# Patient Record
(demographics unavailable — no encounter records)

---

## 2024-10-21 NOTE — END OF VISIT
[Time Spent: ___ minutes] : I have spent [unfilled] minutes of time on the encounter which excludes teaching and separately reported services.
[Time Spent: ___ minutes] : I have spent [unfilled] minutes of time on the encounter which excludes teaching and separately reported services.
no

## 2024-10-23 NOTE — PHYSICAL EXAM
[No Acute Distress] : no acute distress [Normal Appearance] : normal appearance [No Neck Mass] : no neck mass [Normal Rate/Rhythm] : normal rate/rhythm [Normal S1, S2] : normal s1, s2 [No Murmurs] : no murmurs [No Resp Distress] : no resp distress [No Abnormalities] : no abnormalities [Benign] : benign [No Clubbing] : no clubbing [No Edema] : no edema [Normal Color/ Pigmentation] : normal color/ pigmentation [Oriented x3] : oriented x3 [Normal Affect] : normal affect [TextBox_11] : NC/AT [TextBox_68] : diffuse mid to end expiratory wheezes b/l

## 2024-10-23 NOTE — ASSESSMENT
[FreeTextEntry1] : Pathology (10/10/24): Final Diagnosis  1. Stomach; biopsy:  - Antral and oxyntic mucosa with mild reactive changes. No intestinal metaplasia or H. pylori (H T E stain) identified.  2. Left lung nodule; biopsy:  - Fragments of benign thyroid tissue and blood (see note).  - No lung parenchyma is identified. Note: Immunohistochemical stains for TTF1, PAX8, and thyroglobulin highlight the thyroid follicles. The current specimen may not be representative of the target lesion, re-biopsy is recommended if clinical concern persists.   CT Chest 8/23/24, ED visit 8/28/24, CT chest 8/28/24  PET/CT (9/2024): IMPRESSION: Abnormal skull-to-thigh FDG-PET/CT scan. 1. An intensely FDG avid irregular solid nodule in the paramediastinal left lung apex, compatible with lung malignancy. No hypermetabolic hilar or mediastinal nodes. 2. New superior endplate deformities in T12 and L2 with increased activity may reflect recent trauma; please correlate clinically. 3. No suspicious FDG avid malignant tissue in the remaining field of view.  COPD data: Inhaler: Symbicort 160-4.5, Incruse ellipta, albuterol Last exacerbation: 3/3/24  # of exacerbations since initiating Tezspire: 0 (previously 5) Bicarb 24 (3/24) in HIE  6MWT 4/12/2024: 252 meters, no desat on RA, Sierra 7  ABG 4/19/24: no evidence of hypercapnia  IgE 3/26/24: 411 (ON Prednisone 60mg)  PFTs      4/2024 FEV1/FVC  67% FEV1      1.37, 66% FVC       2.06, 78% TLC       3.26, 66% RV        1.21, 63% DLCO      10.06, 45% -4/2024: No bronchodilator response  A/P: 62 yo F h/o COPD and CHAPARRITA lung nodule returns to clinic.  I reviewed Ms. Pompey's case with radiology. The CHAPARRITA nodule is new compared to prior, persistent, and hypermetabolic on PET. With thyroid tissue in the biopsy, I suspect the current biopsy is not representative of the lesion. Options include repeat EUS, CT guided biopsy, or surgical biopsy. EBUS would need to traverse the esophagus to sample the lesion. I would favor repeat EUS before pursuing a surgical biopsy. Ms. RODRIGUEZ understands and agrees.   I spoke to Dr. Wilkerson, who will rearrange repeat EUS next week.  1. CHAPARRITA lung nodule: hypermetabolic 2. COPD: moderate per GOLD 3. Emphysema 4. h/o RA 5. History of eosinophilia  -continue LABA/ICS, Tezspire; PRN Albuterol -discussed with GI; repeat EUS; if again negative, I think she will need a surgical biopsy -check Nodify cdt/xl2 -follow-up with me or Dr. Nieves in 2 weeks

## 2024-10-23 NOTE — HISTORY OF PRESENT ILLNESS
[Former] : former [>= 20 pack years] : >= 20 pack years [Rheumatologic] : rheumatologic [TextBox_4] : I saw Ms. Monica Pompey in clinic today in follow-up for her CHAPARRITA lung nodule, tobacco use, and COPD; she is accompanied by her daughter, who supplements her history. In review, Ms. Pompey is a 60 yo F h/o tobacco use c/b COPD and emphysema, RA (on Etanercept), and anxiety Ms. Pompey was last seen by Dr. Nieves with plan for EUS by Dr. Wilkerson  61F (accomapnied by her daughter: She) former smokers (smoked about one pack a day for 27 years, quit 17 years ago) with COPD (atopic phenotype) and ICU admissions in the past (never intubated) referred to pulm to establish care. At initial visit was using symbicort and albuterol plus chest vest for airway clearance twice a day. Has a chronic cough produces clear sputum. Can walk maximum 1-2 blocks before she gets short of breath. No history of TB. Not UTD on flu vaccine or pnuemonia vaccine. On 60mg prednisone daily for many years, and bactrim qd. She is commonly exposed to people who smoke cigarettes which also triggers her COPD. Has RA. Follows with Dr. Dotty Avila. Currently on hydroxychloraquine 100-200mg once a day. Formally on etanercept.  Sent for CT chest given symptoms and high risk malignancy, found to have CHAPARRITA nodule concerning for malignancy vs inflammatory. PFTs obtained, demonstrated mod obstruction and restriction, mod reduced DLCO, no BDR. Decreased 6MWT to 252 meters without desaturation but verona score 7. No hypercapnia on ABG. Found to have elevated IgE (411) while ON prednisone 60mg. She was escalated to triple therapy (incruse/symbicort) + azithromycin and instructed to attempt very slow prednisone wean, however at last follow up was unable to tolerate weaning of prednisone, OK from 60mg -->50mg, but could not tolerate 40mg and went back on 50mg daily. She did not get repeat CT chest as instructed but had been adherent to azithromycin and bactrim. Plan was to obtain HRCT and start biologic w/ Tezpire. She has been difficult to contact and initiate therapy, received first dose teszpire during last visit and attempt to wean prednisone again after 3 months. Plan was to obtain CT chest as instructed previously and sleep study.  No sleep study done, but returns to follow up CT.  COPD: Inhaler: Symbicort 160-4.5, albuterol Last exacerbation: 3/3/24  # of exacerbations in last 12 months: 5 Bicarb 24 (3/24) in HIE  She had been doing incredibly well - prednisone was down to 20mg daily, she had no cough, breathing was great. She had a mechanical fall and was told by ED she had nondisplaced fracture of L2, seen by neurosurgery who recommended pain control, but her pharmacy does not have any available pain medication. With her fall and stress she noticed a slight worsening of her breathing/wheezing, took prednisone 40mg yesterday. Today she has not taken her inhaler or prednisone, she took an albuterol pump during visit with me.  In august 2024 We reviewed her CT findings with concerning nodule that will require EUS.  10/2024: Today, Ms. RODRIGUEZ feels anxious. She has not had a respiratory exacerbation since our last visit. She is tearful today in discussing the evaluation of her lung nodule. She is currently taking Prednisone 60 mg PO daily, which is treating her joint disease. She is tolerating the Symbicort and Tezepelumab well.  [YearQuit] : 2006

## 2024-10-23 NOTE — PROCEDURE
[FreeTextEntry1] : EcG 8/1/24 Nonspecific ST and T wave abnormality When compared with ECG of 12-AUG-2022 15:53, No significant change was found  CT Chest 8/23/24: When compared to examination dated 5/30/2024 there has been interval development of a part solid nodule measuring 29 x 28 mm in transverse by AP dimensions (9:67) within the apical posterior segment of the left upper lobe. The solid component has increased in size measuring 26 x 12 mm as above. Further imaging to include whole-body PET/CT is suggested to exclude developing adenocarcinoma. Substantial paraseptal and moderate centrilobular emphysema again noted. Multiple stable subcentimeter as well as solid and calcified micronodules as described above. *** CT Chest 5/30/24 (personally reviewed images, formal report pending) - Severe upper lobe paraseptal emphysema, some centrilobular emphysema, no evidence of bronchiectasis or ABPA, CHAPARRITA 8mm part solid nodule with cystic lucencies suspicious for malignancy *** PFT 4/12/24 FVC 2.06 (78%) --> 2.06 (76%) FEV1 1.40 (68%) --> 1.37 (66%) FEV1/FVC 68% TLC 3.25 (66%) DLCO 10.06 (45%)  Mixed moderate obstructive and restrictive pattern with moderately reduced DLCO  6MWT 252 m, SpO2 96%-->97%,  --> 120, /83 --> 136/84, Sierra SOB 7, fatigue 4 ABG 7.41/46/29 (actually VBG) *** Labs 3/26/24 IgE 411 (ELEV) ProBNP 167 CBC - test cancelled due to clotting

## 2024-10-23 NOTE — ASSESSMENT
[FreeTextEntry1] : Pathology (10/10/24): Final Diagnosis  1. Stomach; biopsy:  - Antral and oxyntic mucosa with mild reactive changes. No intestinal metaplasia or H. pylori (H T E stain) identified.  2. Left lung nodule; biopsy:  - Fragments of benign thyroid tissue and blood (see note).  - No lung parenchyma is identified. Note: Immunohistochemical stains for TTF1, PAX8, and thyroglobulin highlight the thyroid follicles. The current specimen may not be representative of the target lesion, re-biopsy is recommended if clinical concern persists.   CT Chest 8/23/24, ED visit 8/28/24, CT chest 8/28/24  PET/CT (9/2024): IMPRESSION: Abnormal skull-to-thigh FDG-PET/CT scan. 1. An intensely FDG avid irregular solid nodule in the paramediastinal left lung apex, compatible with lung malignancy. No hypermetabolic hilar or mediastinal nodes. 2. New superior endplate deformities in T12 and L2 with increased activity may reflect recent trauma; please correlate clinically. 3. No suspicious FDG avid malignant tissue in the remaining field of view.  COPD data: Inhaler: Symbicort 160-4.5, Incruse ellipta, albuterol Last exacerbation: 3/3/24  # of exacerbations since initiating Tezspire: 0 (previously 5) Bicarb 24 (3/24) in HIE  6MWT 4/12/2024: 252 meters, no desat on RA, Sierra 7  ABG 4/19/24: no evidence of hypercapnia  IgE 3/26/24: 411 (ON Prednisone 60mg)  PFTs      4/2024 FEV1/FVC  67% FEV1      1.37, 66% FVC       2.06, 78% TLC       3.26, 66% RV        1.21, 63% DLCO      10.06, 45% -4/2024: No bronchodilator response  A/P: 60 yo F h/o COPD and CHAPARRITA lung nodule returns to clinic.  I reviewed Ms. Pompey's case with radiology. The CHAPARRITA nodule is new compared to prior, persistent, and hypermetabolic on PET. With thyroid tissue in the biopsy, I suspect the current biopsy is not representative of the lesion. Options include repeat EUS, CT guided biopsy, or surgical biopsy. EBUS would need to traverse the esophagus to sample the lesion. I would favor repeat EUS before pursuing a surgical biopsy. Ms. RODRIGUEZ understands and agrees.   I spoke to Dr. Wilkerson, who will rearrange repeat EUS next week.  1. CHAPARRITA lung nodule: hypermetabolic 2. COPD: moderate per GOLD 3. Emphysema 4. h/o RA 5. History of eosinophilia  -continue LABA/ICS, Tezspire; PRN Albuterol -discussed with GI; repeat EUS; if again negative, I think she will need a surgical biopsy -check Nodify cdt/xl2 -follow-up with me or Dr. Nieves in 2 weeks

## 2024-10-23 NOTE — CONSULT LETTER
[Dear  ___] : Dear  [unfilled], [Consult Letter:] : I had the pleasure of evaluating your patient, [unfilled]. [Please see my note below.] : Please see my note below. [Consult Closing:] : Thank you very much for allowing me to participate in the care of this patient.  If you have any questions, please do not hesitate to contact me. [FreeTextEntry2] : Dr. Bernabe Chong

## 2024-10-23 NOTE — ADDENDUM
[FreeTextEntry1] : Addendum (Nnamdi; 10/23/24): I called Ms. Pompey's daughter about her Nodify testing.  The pre-test risk was increased using the cdt assay. There is a moderate level of autoantibodies.  Nodify cdt: post-test risk of malignancy: 78%  The result supports our plan for a surgery evaluation, if the repeat EUS is negative. She is scheduled for 10/24/24.

## 2024-10-23 NOTE — REVIEW OF SYSTEMS
[Fatigue] : fatigue [Cough] : cough [Chest Tightness] : chest tightness [Dyspnea] : dyspnea [Wheezing] : wheezing [SOB on Exertion] : sob on exertion [Immunocompromised] : immunocompromised [Arthralgias] : arthralgias [Chronic Pain] : chronic pain [Negative] : Psychiatric [TextBox_57] : chronic steroids

## 2024-11-06 NOTE — PHYSICAL EXAM
[No Acute Distress] : no acute distress [Normal Oropharynx] : normal oropharynx [Normal Appearance] : normal appearance [No Neck Mass] : no neck mass [Normal Rate/Rhythm] : normal rate/rhythm [Normal S1, S2] : normal s1, s2 [No Murmurs] : no murmurs [No Resp Distress] : no resp distress [No Abnormalities] : no abnormalities [Benign] : benign [No Clubbing] : no clubbing [No Edema] : no edema [Normal Color/ Pigmentation] : normal color/ pigmentation [Oriented x3] : oriented x3 [Normal Affect] : normal affect [TextBox_68] : diffuse mid to end expiratory wheezes b/l

## 2024-11-06 NOTE — HISTORY OF PRESENT ILLNESS
[Former] : former [>= 20 pack years] : >= 20 pack years [TextBox_4] : 61F (accomapnied by her daughter: She) former smokers (smoked about one pack a day for 27 years, quit 17 years ago) with COPD (atopic phenotype) and ICU admissions in the past (never intubated) referred to pulm to establish care. At initial visit was using symbicort and albuterol plus chest vest for airway clearance twice a day. Could only walk maximum 1-2 blocks before she gets short of breath and was chronically on 60mg prednisone daily for many years, and bactrim qd. She is commonly exposed to people who smoke cigarettes which also triggers her COPD. Has RA. Follows with Dr. Dotty Avila. Currently on hydroxychloraquine 100-200mg once a day.  PFTs obtained, demonstrated mod obstruction and restriction, mod reduced DLCO, no BDR. Decreased 6MWT to 252 meters without desaturation. No hypercapnia on ABG. Found to have elevated IgE (411) while ON prednisone 60mg. She was escalated to triple therapy (incruse/symbicort) + azithromycin and instructed to attempt very slow prednisone wean. but had difficulty and thus started on fasenra.  Also sent for CT chest given symptoms and high risk malignancy, found to have CHAPARRITA nodule now confirmed to be malignancy via EUS bx of apical CHAPARRITA nodule, favoring adeno. Returns for follow up.  COPD: Inhaler: Symbicort 160-4.5, albuterol Last exacerbation: 3/3/24  # of exacerbations since initiating Fasenra: 0 Bicarb 24 (3/24) in HIE  Overall significant improvement in her dyspnea and chronic bronchitis symptoms since starting Fasenra - she remains on prednisone because she stopped the wean when the focus was on possible lung ca. Admits she takes it sparingly because she knows it's dangerous to stop "suddenly", probably takes 40mg every 2-3 days to avoid sudden cessation. Using nebulizer sparingly, walking more, less SOB. No chest pain, no dizziness, Significant depression/anxiety about cancer diagnosis. [YearQuit] : 2006 [Rheumatologic] : rheumatologic

## 2024-11-06 NOTE — ASSESSMENT
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 Had significant improvement on Fasenra and triple therapy (Incruse, Breo), and azithromycin. Discussed further weaning of prednisone - 30mg x 1 week, 20mg x 1 week, 10mg x 2 weeks and will follow up in 1 month. Plan to repeat eos/IgE at next visit.  CHAPARRITA part solid from 5/2024 has grown in size with now 2cm lesion and increased solid component, paraesophageal. Now s/p EUS x 2 (first EUS nondiagnostic, pathology thyroid tissue), repeat EUS 10/24/24 with lung ca favoring adeno. Plan for brain MRI and bronchoscopy with EBUS to complete staging and differentiate between IIb and IIIa (radiation vs. radiation + chemo). PDL1 and targetable mutations sent.  EBUS confirmed for 11/11/24, referral to Dr. Parson and Dr. Wernicke sent. Brain MRI ordered. Follow up 1 month.

## 2024-11-06 NOTE — PROCEDURE
[FreeTextEntry1] : Pathology 10/24/24 Final Diagnosis LUNG, LEFT, UPPER LOBE, FNA  POSITIVE FOR MALIGNANT CELLS. Non- small cell carcinoma, favor adenocarcinoma - see Note. The cell block shows scant similar findings.  *** EcG 8/1/24 Nonspecific ST and T wave abnormality When compared with ECG of 12-AUG-2022 15:53, No significant change was found  CT Chest 8/23/24: When compared to examination dated 5/30/2024 there has been interval development of a part solid nodule measuring 29 x 28 mm in transverse by AP dimensions (9:67) within the apical posterior segment of the left upper lobe. The solid component has increased in size measuring 26 x 12 mm as above. Further imaging to include whole-body PET/CT is suggested to exclude developing adenocarcinoma. Substantial paraseptal and moderate centrilobular emphysema again noted. Multiple stable subcentimeter as well as solid and calcified micronodules as described above. *** CT Chest 5/30/24 (personally reviewed images, formal report pending) - Severe upper lobe paraseptal emphysema, some centrilobular emphysema, no evidence of bronchiectasis or ABPA, CHAPARRITA 8mm part solid nodule with cystic lucencies suspicious for malignancy *** PFT 4/12/24 FVC 2.06 (78%) --> 2.06 (76%) FEV1 1.40 (68%) --> 1.37 (66%) FEV1/FVC 68% TLC 3.25 (66%) DLCO 10.06 (45%)  Mixed moderate obstructive and restrictive pattern with moderately reduced DLCO  6MWT 252 m, SpO2 96%-->97%,  --> 120, /83 --> 136/84, Sierra SOB 7, fatigue 4 ABG 7.41/46/29 (actually VBG) *** Labs 3/26/24 IgE 411 (ELEV) ProBNP 167 CBC - test cancelled due to clotting

## 2024-11-13 NOTE — ASSESSMENT
[FreeTextEntry1] : 61F, former smoker, on steroids and immune modulators for RA, here for newly diagnosed lung adenocarcinoma.  #NSCLC - T4NxMx, at least Stage IIIA KRAS G12C,  Staging is incomplete at this point pending results of the EBUS and brain MR. Prior tumor board discussion made mention of concerns for mediastinal invasion. In any case, treatment would likely be combination chemo immunotherapy, however further discussion with her rheumatologist is necessary regarding her RA treatment as her steroid doses are incompatible with immunotherapy. Discussed with patient and family that we will need to reconvene once we have all the relevant test results to further tailor her treatment. - labs ordered: hepatitis panel, HIV, CBC, CMP (patient will have them drawn at [facility]) - plan for telehealth follow-up Monday afternoon

## 2024-11-13 NOTE — END OF VISIT
[] : Fellow [FreeTextEntry3] : Seen with , oncology fellow.  62 yo F with smoking history, who quit 17 yrs ago here for initial evaluation of NSCLC, stage IIIA at least. At this time we need to obtain the following info: final staging - pending EBUS results (done on 11/11) and MRIB (scheduled for Monday 11/18) I worry because she is on high dose prednisone for several years for her RA. This is not compatible with IO adminsitration. NGS testing reveals a KRAS G12C mutation and PDL1 40-50%. Will see her in follow up on Monday via TH after her MRIB is done to finalize treatment plan. [Time Spent: ___ minutes] : I have spent [unfilled] minutes of time on the encounter which excludes teaching and separately reported services.

## 2024-11-13 NOTE — HISTORY OF PRESENT ILLNESS
[Disease: _____________________] : Disease: [unfilled] [T: ___] : T[unfilled] [N: ___] : N[unfilled] [M: ___] : M[unfilled] [AJCC Stage: ____] : AJCC Stage: [unfilled] [de-identified] : 61F former smoker (27py, quit ~17y ago) referred by Dr. Nieves after surveillance CT scans showing interval growth of CHAPARRITA lesion with biopsy showing lung adenocarcinoma, s/p EBUS 24 with results pending and MRI scheduled for 24, here to establish care.  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 Nieves for her COPD/atopic asthma. Given her smoking history, she also has been under surveillance for a lung nodule that has increased in size.    CT Chest 24 Left upper lobe 0.8 cm subsolid nodule contiguous with and adjacent to an emphysematous cystic air space medially, which demonstrates irregular nodular wall thickening as described above. The exact etiology is unclear, but one differential diagnostic consideration is for primary lung neoplasm. Recommend correlation with PET/CT. No evidence of pulmonary fibrosis as clinically questioned.  CT Chest 24 When compared to examination dated 2024 there has been interval development of a part solid nodule measuring 29 x 28 mm in transverse by AP dimensions (9:67) within the apical posterior segment of the left upper lobe. The solid component has increased in size measuring 26 x 12 mm as above. Further imaging to include whole-body PET/CT is suggested to exclude developing adenocarcinoma. Substantial paraseptal and moderate centrilobular emphysema again noted. Multiple stable subcentimeter as well as solid and calcified micronodules as described above.  PET-CT 24 Abnormal skull-to-thigh FDG-PET/CT scan. 1. An intensely FDG avid irregular solid nodule in the paramediastinal left lung apex, compatible with lung malignancy. No hypermetabolic hilar or mediastinal nodes. 2. New superior endplate deformities in T12 and L2 with increased activity may reflect recent trauma; please correlate clinically. 3. No suspicious FDG avid malignant tissue in the remaining field of view.  Lung, CHAPARRITA FNA Pathology 10/24/24 POSITIVE FOR MALIGNANT CELLS. Non- small cell carcinoma, favor adenocarcinoma - see Note. The cell block shows scant similar findings.  EBUS 24 Results pending  MRI Brain 24  [de-identified] : Adenocarcinoma - PDL1 40-50%, KRAS G12C [de-identified] : Pulm:  [de-identified] : She is visibly and vocally upset due to prior interactions with some of the staff. Denies experiencing any symptoms relating to her lung cancer compared to "everything else that she has going on." No noted shortness of breath. Has a chronic cough that predates detected lesion.

## 2024-11-13 NOTE — REASON FOR VISIT
[Initial Consultation] : an initial consultation [Family Member] : family member [FreeTextEntry2] : NSCLC - adenocarcinoma

## 2024-11-13 NOTE — PHYSICAL EXAM
[Restricted in physically strenuous activity but ambulatory and able to carry out work of a light or sedentary nature] : Status 1- Restricted in physically strenuous activity but ambulatory and able to carry out work of a light or sedentary nature, e.g., light house work, office work [Normal] : grossly intact [de-identified] : tachycardic [de-identified] : currently upset given the circumstances

## 2024-11-13 NOTE — PHYSICAL EXAM
[Restricted in physically strenuous activity but ambulatory and able to carry out work of a light or sedentary nature] : Status 1- Restricted in physically strenuous activity but ambulatory and able to carry out work of a light or sedentary nature, e.g., light house work, office work [Normal] : grossly intact [de-identified] : tachycardic [de-identified] : currently upset given the circumstances

## 2024-11-13 NOTE — REVIEW OF SYSTEMS
[Cough] : cough [Joint Pain] : joint pain [Anxiety] : anxiety [Depression] : depression [Negative] : Heme/Lymph

## 2024-11-13 NOTE — PHYSICAL EXAM
[Restricted in physically strenuous activity but ambulatory and able to carry out work of a light or sedentary nature] : Status 1- Restricted in physically strenuous activity but ambulatory and able to carry out work of a light or sedentary nature, e.g., light house work, office work [Normal] : grossly intact [de-identified] : tachycardic [de-identified] : currently upset given the circumstances

## 2024-11-18 NOTE — HISTORY OF PRESENT ILLNESS
[FreeTextEntry1] : Ms. 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 Shepard. Staging is incomplete EBUS results [id number]/18/24      MRI result pending 11/18/24  Heme Onc: Dr. Shepard:  treatment would likely be combination chemo immunotherapy, however further discussion with her rheumatologist is necessary regarding her RA treatment as her steroid doses are incompatible with immunotherapy Smoking Hx Prior RT: Pacemaker G; P: Menopause  11/26/24 NEW:  _______________________________________________________________________________________ CT Chest 5/30/24 Left upper lobe 0.8 cm subsolid nodule contiguous with and adjacent to an emphysematous cystic air space medially, which demonstrates irregular nodular wall thickening as described above. The exact etiology is unclear, but one differential diagnostic consideration is for primary lung neoplasm. Recommend correlation with PET/CT. No evidence of pulmonary fibrosis as clinically questioned.  CT Chest 8/23/24 When compared to examination dated 5/30/2024 there has been interval development of a part solid nodule measuring 29 x 28 mm in transverse by AP dimensions (9:67) within the apical posterior segment of the left upper lobe. The solid component has increased in size measuring 26 x 12 mm as above. Further imaging to include whole-body PET/CT is suggested to exclude developing adenocarcinoma. Substantial paraseptal and moderate centrilobular emphysema again noted. Multiple stable subcentimeter as well as solid and calcified micronodules as described above.  PET-CT 9/25/24 Abnormal skull-to-thigh FDG-PET/CT scan. 1. An intensely FDG avid irregular solid nodule in the paramediastinal left lung apex, compatible with lung malignancy. No hypermetabolic hilar or mediastinal nodes. 2. New superior endplate deformities in T12 and L2 with increased activity may reflect recent trauma; please correlate clinically. 3. No suspicious FDG avid malignant tissue in the remaining field of view.  Lung, CHAPARRITA FNA Pathology 10/24/24 POSITIVE FOR MALIGNANT CELLS. Non- small cell carcinoma, favor adenocarcinoma - see Note. The cell block shows scant similar findings.  EBUS 11/11/24 Results pending  MRI Brain 11/18/24.

## 2024-11-19 NOTE — REASON FOR VISIT
[Initial Consultation] : an initial consultation [Home] : at home, [unfilled] , at the time of the visit. [Medical Office: ([facility])___] : at the medical office located in  [Family Member] : family member [Follow-Up Visit] : a follow-up [FreeTextEntry2] : NSCLC - adenocarcinoma

## 2024-11-19 NOTE — ASSESSMENT
[FreeTextEntry1] : 62 yo F with smoking history, who quit 17 yrs ago here for follow up of KRAS G12C, PDL1 40-50%, NSCLC, stage IIIA.  NSCLC - T4N0M0 - Stage IIIA I independently reviewed her MRI brain which to me appears to be negative, though final read is still pending. I recommend she be evaluated by CT surgery for possibly surgically candidacy.  If she is a surgical candidate she would be a candidate for neoadjuvant chemoimmunotherapy per Checkmate 77T, keynote 671 or AEGEAN. I confirmed with her today that she has been off all steroids for the past 3 months. I also discussed her case with Dr. Seymour, her rheumatologist, Dr. Seymour stated that she is cleared for immunotherapy from her standpoint and that she does not require steroids.  She is only on Plaquenil for her rheumatoid arthritis. We will bring her in next week for chemo education if she is determined to be a surgical candidate. Blood work to be obtained next week: CBC, CMP, TSH, hepatitis panel, HIV All questions answered.

## 2024-11-19 NOTE — HISTORY OF PRESENT ILLNESS
[Disease: _____________________] : Disease: [unfilled] [T: ___] : T[unfilled] [N: ___] : N[unfilled] [M: ___] : M[unfilled] [AJCC Stage: ____] : AJCC Stage: [unfilled] [de-identified] : 61F former smoker (27py, quit ~17y ago) referred by Dr. Nieves after surveillance CT scans showing interval growth of CHAPARRITA lesion with biopsy showing lung adenocarcinoma, s/p EBUS 24 with results pending and MRI scheduled for 24, here to establish care.  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 Nieves for her COPD/atopic asthma. Given her smoking history, she also has been under surveillance for a lung nodule that has increased in size.    CT Chest 24 Left upper lobe 0.8 cm subsolid nodule contiguous with and adjacent to an emphysematous cystic air space medially, which demonstrates irregular nodular wall thickening as described above. The exact etiology is unclear, but one differential diagnostic consideration is for primary lung neoplasm. Recommend correlation with PET/CT. No evidence of pulmonary fibrosis as clinically questioned.  CT Chest 24 When compared to examination dated 2024 there has been interval development of a part solid nodule measuring 29 x 28 mm in transverse by AP dimensions (9:67) within the apical posterior segment of the left upper lobe. The solid component has increased in size measuring 26 x 12 mm as above. Further imaging to include whole-body PET/CT is suggested to exclude developing adenocarcinoma. Substantial paraseptal and moderate centrilobular emphysema again noted. Multiple stable subcentimeter as well as solid and calcified micronodules as described above.  PET-CT 24 Abnormal skull-to-thigh FDG-PET/CT scan. 1. An intensely FDG avid irregular solid nodule in the paramediastinal left lung apex, compatible with lung malignancy. No hypermetabolic hilar or mediastinal nodes. 2. New superior endplate deformities in T12 and L2 with increased activity may reflect recent trauma; please correlate clinically. 3. No suspicious FDG avid malignant tissue in the remaining field of view.  Lung, CHAPARRITA FNA Pathology 10/24/24 POSITIVE FOR MALIGNANT CELLS. Non- small cell carcinoma, favor adenocarcinoma - see Note. The cell block shows scant similar findings.  EBUS 24 ALL ln NEGATIVE  MRI Brain 24 - READ PENDING [de-identified] : Adenocarcinoma - PDL1 40-50%, KRAS G12C [de-identified] : Pulm:  [de-identified] : She is visibly and vocally upset due to prior interactions with some of the staff. Denies experiencing any symptoms relating to her lung cancer compared to "everything else that she has going on." No noted shortness of breath. Has a chronic cough that predates detected lesion.

## 2024-11-19 NOTE — REASON FOR VISIT
[Initial Consultation] : an initial consultation [Home] : at home, [unfilled] , at the time of the visit. [Medical Office: ([facility])___] : at the medical office located in  [Family Member] : family member [Follow-Up Visit] : a follow-up [FreeTextEntry2] : NSCLC - adenocarcinoma

## 2024-11-26 NOTE — PHYSICAL EXAM
[Restricted in physically strenuous activity but ambulatory and able to carry out work of a light or sedentary nature] : Status 1- Restricted in physically strenuous activity but ambulatory and able to carry out work of a light or sedentary nature, e.g., light house work, office work [General Appearance - Alert] : alert [General Appearance - In No Acute Distress] : in no acute distress [Outer Ear] : the ears and nose were normal in appearance [Oropharynx] : the oropharynx was normal [Neck Appearance] : the appearance of the neck was normal [Neck Cervical Mass (___cm)] : no neck mass was observed [Jugular Venous Distention Increased] : there was no jugular-venous distention [Thyroid Diffuse Enlargement] : the thyroid was not enlarged [Thyroid Nodule] : there were no palpable thyroid nodules [Auscultation Breath Sounds / Voice Sounds] : lungs were clear to auscultation bilaterally [Heart Sounds Gallop] : no gallops [Murmurs] : no murmurs [Heart Sounds Pericardial Friction Rub] : no pericardial rub [Tachycardic ___] : the heart rate was tachycardic at [unfilled] bpm [Regular] : the rhythm was regular [Bowel Sounds] : normal bowel sounds [Abdomen Soft] : soft [Abdomen Tenderness] : non-tender [Abdomen Mass (___ Cm)] : no abdominal mass palpated [Cervical Lymph Nodes Enlarged Posterior Bilaterally] : posterior cervical [Cervical Lymph Nodes Enlarged Anterior Bilaterally] : anterior cervical [Supraclavicular Lymph Nodes Enlarged Bilaterally] : supraclavicular [Axillary Lymph Nodes Enlarged Bilaterally] : axillary [Femoral Lymph Nodes Enlarged Bilaterally] : femoral [Inguinal Lymph Nodes Enlarged Bilaterally] : inguinal [FreeTextEntry1] : walk with a rolling walker [Skin Color & Pigmentation] : normal skin color and pigmentation [Skin Turgor] : normal skin turgor [] : no rash [Deep Tendon Reflexes (DTR)] : deep tendon reflexes were 2+ and symmetric [Sensation] : the sensory exam was normal to light touch and pinprick [No Focal Deficits] : no focal deficits [Oriented To Time, Place, And Person] : oriented to person, place, and time [Impaired Insight] : insight and judgment were intact [Affect] : the affect was normal

## 2024-11-26 NOTE — HISTORY OF PRESENT ILLNESS
[FreeTextEntry1] : Ms. 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 Parson. EBUS 3 LN negative for malignant cells 11/11/24. She is seeing Dr. Garland with CT surgery to determine if she is eligible for surgical resection on 11/26/24.   Heme Onc: Dr. Shepard:  treatment would likely be combination chemo immunotherapy, however further discussion with her rheumatologist is necessary regarding her RA treatment as her steroid doses are incompatible with immunotherapy Smoking Hx: former smoker, quit 17 years ago Prior RT: Pacemaker G; P: Menopause  11/26/24 NEW:  _______________________________________________________________________________________ CT Chest 5/30/24 Left upper lobe 0.8 cm subsolid nodule contiguous with and adjacent to an emphysematous cystic air space medially, which demonstrates irregular nodular wall thickening as described above. The exact etiology is unclear, but one differential diagnostic consideration is for primary lung neoplasm. Recommend correlation with PET/CT. No evidence of pulmonary fibrosis as clinically questioned.  CT Chest 8/23/24 When compared to examination dated 5/30/2024 there has been interval development of a part solid nodule measuring 29 x 28 mm in transverse by AP dimensions (9:67) within the apical posterior segment of the left upper lobe. The solid component has increased in size measuring 26 x 12 mm as above. Further imaging to include whole-body PET/CT is suggested to exclude developing adenocarcinoma. Substantial paraseptal and moderate centrilobular emphysema again noted. Multiple stable subcentimeter as well as solid and calcified micronodules as described above.  PET-CT 9/25/24 Abnormal skull-to-thigh FDG-PET/CT scan. 1. An intensely FDG avid irregular solid nodule in the paramediastinal left lung apex, compatible with lung malignancy. No hypermetabolic hilar or mediastinal nodes. 2. New superior endplate deformities in T12 and L2 with increased activity may reflect recent trauma; please correlate clinically. 3. No suspicious FDG avid malignant tissue in the remaining field of view.  Lung, CHAPARRITA FNA Pathology 10/24/24 POSITIVE FOR MALIGNANT CELLS. Non- small cell carcinoma, favor adenocarcinoma - see Note. The cell block shows scant similar findings.  EBUS 11/11/24 3 LN negative for malignant cells  MRI Brain 11/18/24 -No evidence of intracranial metastasis.

## 2024-11-26 NOTE — ASSESSMENT
[FreeTextEntry1] : 60 yo F with smoking history, who quit 17 yrs ago here for follow up of KRAS G12C, PDL1 40-50%, NSCLC, stage IIIA.  NSCLC - T4N0M0 - Stage IIIA --we discussed during the visit today strategies depending on stage. TB discussion was indicative of T4 disease, needing SKYLA vs ChemoRT. We discussed the option of carboplatin/pemetrexed/nivolumab as SKYLA per Checkmate 77T vs cisplatin/pemetrexed with RT per PROCLAIM trial which showed non-inferiority to cisplatin/etoposide with RT. She understands however that cisplatin might not be an option depending on her labs today and hearing test. --we will defer her hearing test for now, as she will see  today who will determine surgical candidacy All questions answered. RTC in 3 wks, to be changed depending on evaluation by CTSx.

## 2024-11-26 NOTE — REASON FOR VISIT
[Follow-Up Visit] : a follow-up [Initial Consultation] : an initial consultation [Home] : at home, [unfilled] , at the time of the visit. [Medical Office: ([facility])___] : at the medical office located in  [Family Member] : family member [FreeTextEntry2] : NSCLC - adenocarcinoma

## 2024-11-26 NOTE — REASON FOR VISIT
[Follow-Up Visit] : a follow-up [Initial Consultation] : an initial consultation [Home] : at home, [unfilled] , at the time of the visit. [Medical Office: ([facility])___] : at the medical office located in  [Family Member] : family member [FreeTextEntry2] : NSCLC - adenocarcinoma

## 2024-11-26 NOTE — HISTORY OF PRESENT ILLNESS
[Disease: _____________________] : Disease: [unfilled] [T: ___] : T[unfilled] [N: ___] : N[unfilled] [M: ___] : M[unfilled] [AJCC Stage: ____] : AJCC Stage: [unfilled] [de-identified] : 61F former smoker (27py, quit ~17y ago) referred by Dr. Nieves after surveillance CT scans showing interval growth of CHAPARRITA lesion with biopsy showing lung adenocarcinoma, s/p EBUS 24 with results pending and MRI scheduled for 24, here for follow up.  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 Nieves for her COPD/atopic asthma. Given her smoking history, she also has been under surveillance for a lung nodule that has increased in size.    CT Chest 24 Left upper lobe 0.8 cm subsolid nodule contiguous with and adjacent to an emphysematous cystic air space medially, which demonstrates irregular nodular wall thickening as described above. The exact etiology is unclear, but one differential diagnostic consideration is for primary lung neoplasm. Recommend correlation with PET/CT. No evidence of pulmonary fibrosis as clinically questioned.  CT Chest 24 When compared to examination dated 2024 there has been interval development of a part solid nodule measuring 29 x 28 mm in transverse by AP dimensions (9:67) within the apical posterior segment of the left upper lobe. The solid component has increased in size measuring 26 x 12 mm as above. Further imaging to include whole-body PET/CT is suggested to exclude developing adenocarcinoma. Substantial paraseptal and moderate centrilobular emphysema again noted. Multiple stable subcentimeter as well as solid and calcified micronodules as described above.  PET-CT 24 Abnormal skull-to-thigh FDG-PET/CT scan. 1. An intensely FDG avid irregular solid nodule in the paramediastinal left lung apex, compatible with lung malignancy. No hypermetabolic hilar or mediastinal nodes. 2. New superior endplate deformities in T12 and L2 with increased activity may reflect recent trauma; please correlate clinically. 3. No suspicious FDG avid malignant tissue in the remaining field of view.  Lung, CHAPARRITA FNA Pathology 10/24/24 POSITIVE FOR MALIGNANT CELLS. Non- small cell carcinoma, favor adenocarcinoma - see Note. The cell block shows scant similar findings.  EBUS 24 ALL ln NEGATIVE  MRI Brain 24 - No evidence of intracranial metastasis. [de-identified] : Adenocarcinoma - PDL1 40-50%, KRAS G12C [de-identified] : Pulm:  CTSx:  [de-identified] : No complaints today.

## 2024-11-26 NOTE — DATA REVIEWED
[FreeTextEntry1] : PFT on 04/08/2024 FEV1 = 1.40, 88% of predicted value, FVC = 2.06, 78% of predicted value, PEF = 3.29, 59% of predicted value and DLCO = 10.06, 45% of predicted value.

## 2024-11-26 NOTE — HISTORY OF PRESENT ILLNESS
[FreeTextEntry1] : Patient is a 62 yo female, former smoker (1ppd x 27 yrs, quit 15rs ago), with a PMHx of COPD, Rheumatoid arthritis, T2DM, HLD, hypothyroidism, glaucoma and cataracts. She is referred by DR. JAJA BOLTON for surgical discussion of recent biopsy proven lung adenocarcinoma.   She was found to have a left upper lobe subsolid nodule in May 2024. Follow up CT chest in Aug showed the developing adjacent part solid nodule measuring 29x28 mm in transverse by AP dimensions.  She underwent EGD EUS biopsy with Dr. Wilkerson on 10/24/2024 and was found to have non-small cell carcinoma, favor adenocarcinoma.  Her case was discussed at  on 10/31/2024.  There was possible mediastinal invasion.  She was referred to see radiation (Dr. Wernicke) and med oncology (Dr. Parson). Plan was to complete staging with brain mri and ebus. PDL1 and targetable mutations sent.  She had her EBUS biopsy on 11/11/2024 with Dr. Salinas and Brain MRI on 11/18/2024, presenting today for surgical consultation. Patient reports in her usual health status without unintentional weight loss, headache, anorexia, fatigue, chest pain, dyspnea, or hemoptysis. Stopped prednisone recently.   Work up is as follows:  MRI Brain 11/18/24 -No evidence of intracranial metastasis.  EBUS 11/11/24: LYMPH NODE,4R, EBUS-GUIDED FNA: NEGATIVE FOR MALIGNANT CELLS LYMPH NODE,11L, EBUS-GUIDED FNA: NEGATIVE FOR MALIGNANT CELLS LYMPH NODE,7, EBUS-GUIDED FNA: NEGATIVE FOR MALIGNANT CELLS  Lung, CHAPARRITA FNA Pathology 10/24/24 POSITIVE FOR MALIGNANT CELLS. Non- small cell carcinoma, favor adenocarcinoma - see Note. The cell block shows scant similar findings.  PET-CT 9/25/24 Abnormal skull-to-thigh FDG-PET/CT scan. 1. An intensely FDG avid irregular solid nodule in the paramediastinal left lung apex, compatible with lung malignancy. No hypermetabolic hilar or mediastinal nodes. 2. New superior endplate deformities in T12 and L2 with increased activity may reflect recent trauma; please correlate clinically. 3. No suspicious FDG avid malignant tissue in the remaining field of view.  CT Chest 8/23/24 When compared to examination dated 5/30/2024 there has been interval development of a part solid nodule measuring 29 x 28 mm in transverse by AP dimensions (9:67) within the apical posterior segment of the left upper lobe. The solid component has increased in size measuring 26 x 12 mm as above. Further imaging to include whole-body PET/CT is suggested to exclude developing adenocarcinoma. Substantial paraseptal and moderate centrilobular emphysema again noted. Multiple stable subcentimeter as well as solid and calcified micronodules as described above.  CT Chest 5/30/24 Left upper lobe 0.8 cm subsolid nodule contiguous with and adjacent to an emphysematous cystic air space medially, which demonstrates irregular nodular wall thickening as described above. The exact etiology is unclear, but one differential diagnostic consideration is for primary lung neoplasm. Recommend correlation with PET/CT. No evidence of pulmonary fibrosis as clinically questioned.

## 2024-11-26 NOTE — ASSESSMENT
[FreeTextEntry1] : 62 yo F with smoking history, who quit 17 yrs ago here for follow up of KRAS G12C, PDL1 40-50%, NSCLC, stage IIIA.  NSCLC - T4N0M0 - Stage IIIA --we discussed during the visit today strategies depending on stage. TB discussion was indicative of T4 disease, needing SKYLA vs ChemoRT. We discussed the option of carboplatin/pemetrexed/nivolumab as SKYLA per Checkmate 77T vs cisplatin/pemetrexed with RT per PROCLAIM trial which showed non-inferiority to cisplatin/etoposide with RT. She understands however that cisplatin might not be an option depending on her labs today and hearing test. --we will defer her hearing test for now, as she will see  today who will determine surgical candidacy All questions answered. RTC in 3 wks, to be changed depending on evaluation by CTSx.

## 2024-11-26 NOTE — ASSESSMENT
[FreeTextEntry1] : Patient is a 60 yo female, former smoker (1ppd x 27 yrs, quit 15rs ago), with a PMHx of COPD, Rheumatoid arthritis, T2DM, HLD, hypothyroidism, glaucoma and cataracts. She is referred by DR. JAJA BOLTON for surgical discussion of recent biopsy proven lung adenocarcinoma.   She was found to have a left upper lobe subsolid nodule in May 2024. Follow up CT chest in Aug showed the developing adjacent part solid nodule measuring 29x28 mm in transverse by AP dimensions.  She underwent EGD EUS biopsy with Dr. Wilkerson on 10/24/2024 and was found to have non-small cell carcinoma, favor adenocarcinoma.  Her case was discussed at  on 10/31/2024.  There was possible mediastinal invasion.  She was referred to see radiation (Dr. Wernicke) and med oncology (Dr. Parson). She had her EBUS biopsy on 11/11/2024 with Dr. Salinas and Brain MRI on 11/18/2024, presenting today for surgical consultation. Patient reports in her usual health status without unintentional weight loss, headache, anorexia, fatigue, chest pain, dyspnea, or hemoptysis. Stopped prednisone recently.    Patient feels better since starting medications to treat her COPD. Patient's chest radiology reviewed with daughter including images and shows 29 mm CHAPARRITA nodule that is FDG avid and consistent with biopsy proven carcinoma. The patient would like it out. There is a question of mediastinal invasion which would make it T4. Given her desire for removal, we will plan for an operative exploration to see if there is invasion. If there is no invasion, we will plan for a CHAPARRITA wedge for resection. The risks and benefits of the Left Robotic Assisted/VATS CHAPARRITA wedge resection, possible lobectomy, possible thoracotomy procedure were discussed at length including but not limited to risks of infection, bleeding, need for thoracotomy, arrhythmias, thromboembolic complications, myocardial infarction, need for repetitive procedures, recurrent laryngeal nerve injury leading to hoarseness, as well as risks of anesthesia. Specific risks discussed included risk of recurrence, prolonged air leak, need for chest tube drainage and postoperative oxygen, need for adjuvant therapy, and the need for surveillance.  She wishes to proceed. We will need repeat PFTs and a cardiology clearance prior to the procedure.  Plan: - Left Robotic Assisted/VATS CHAPARRITA wedge resection, possible lobectomy, possible thoracotomy procedure - PFTs - Cardiology clearance  I, Dr. Lyndsey Garland MD, personally performed the evaluation and management (E/M) services for this new patient.  That E/M includes conducting the clinically appropriate initial history &/or exam, assessing all conditions, and establishing the plan of care.  I have also independently reviewed the medical records and imaging at the time of this office visit, and discussed the above mentioned images with interpretations with the patient. Today, my DVAIDA Fidelia Madden, was here to observe &/or participate in the visit & follow plan of care established by me.

## 2024-11-26 NOTE — HISTORY OF PRESENT ILLNESS
[FreeTextEntry1] : Patient is a 60 yo female, former smoker (1ppd x 27 yrs, quit 15rs ago), with a PMHx of COPD, Rheumatoid arthritis, T2DM, HLD, hypothyroidism, glaucoma and cataracts. She is referred by DR. JAJA BOLTON for surgical discussion of recent biopsy proven lung adenocarcinoma.   She was found to have a left upper lobe subsolid nodule in May 2024. Follow up CT chest in Aug showed the developing adjacent part solid nodule measuring 29x28 mm in transverse by AP dimensions.  She underwent EGD EUS biopsy with Dr. Wilkerson on 10/24/2024 and was found to have non-small cell carcinoma, favor adenocarcinoma.  Her case was discussed at  on 10/31/2024.  There was possible mediastinal invasion.  She was referred to see radiation (Dr. Wernicke) and med oncology (Dr. Parson). Plan was to complete staging with brain mri and ebus. PDL1 and targetable mutations sent.  She had her EBUS biopsy on 11/11/2024 with Dr. Salinas and Brain MRI on 11/18/2024, presenting today for surgical consultation. Patient reports in her usual health status without unintentional weight loss, headache, anorexia, fatigue, chest pain, dyspnea, or hemoptysis. Stopped prednisone recently.   Work up is as follows:  MRI Brain 11/18/24 -No evidence of intracranial metastasis.  EBUS 11/11/24: LYMPH NODE,4R, EBUS-GUIDED FNA: NEGATIVE FOR MALIGNANT CELLS LYMPH NODE,11L, EBUS-GUIDED FNA: NEGATIVE FOR MALIGNANT CELLS LYMPH NODE,7, EBUS-GUIDED FNA: NEGATIVE FOR MALIGNANT CELLS  Lung, CHAPARRITA FNA Pathology 10/24/24 POSITIVE FOR MALIGNANT CELLS. Non- small cell carcinoma, favor adenocarcinoma - see Note. The cell block shows scant similar findings.  PET-CT 9/25/24 Abnormal skull-to-thigh FDG-PET/CT scan. 1. An intensely FDG avid irregular solid nodule in the paramediastinal left lung apex, compatible with lung malignancy. No hypermetabolic hilar or mediastinal nodes. 2. New superior endplate deformities in T12 and L2 with increased activity may reflect recent trauma; please correlate clinically. 3. No suspicious FDG avid malignant tissue in the remaining field of view.  CT Chest 8/23/24 When compared to examination dated 5/30/2024 there has been interval development of a part solid nodule measuring 29 x 28 mm in transverse by AP dimensions (9:67) within the apical posterior segment of the left upper lobe. The solid component has increased in size measuring 26 x 12 mm as above. Further imaging to include whole-body PET/CT is suggested to exclude developing adenocarcinoma. Substantial paraseptal and moderate centrilobular emphysema again noted. Multiple stable subcentimeter as well as solid and calcified micronodules as described above.  CT Chest 5/30/24 Left upper lobe 0.8 cm subsolid nodule contiguous with and adjacent to an emphysematous cystic air space medially, which demonstrates irregular nodular wall thickening as described above. The exact etiology is unclear, but one differential diagnostic consideration is for primary lung neoplasm. Recommend correlation with PET/CT. No evidence of pulmonary fibrosis as clinically questioned.

## 2024-11-26 NOTE — REVIEW OF SYSTEMS
[Heart Rate Is Fast] : fast heart rate [Chest Pain] : no chest pain [Palpitations] : no palpitations [Leg Claudication] : intermittent leg claudication [Lower Ext Edema] : lower extremity edema [Wheezing] : no wheezing [Cough] : cough [SOB on Exertion] : shortness of breath during exertion [Arthralgias] : arthralgias [Negative] : Psychiatric

## 2024-11-26 NOTE — ASSESSMENT
[FreeTextEntry1] : Patient is a 60 yo female, former smoker (1ppd x 27 yrs, quit 15rs ago), with a PMHx of COPD, Rheumatoid arthritis, T2DM, HLD, hypothyroidism, glaucoma and cataracts. She is referred by DR. JAJA BOLTON for surgical discussion of recent biopsy proven lung adenocarcinoma.   She was found to have a left upper lobe subsolid nodule in May 2024. Follow up CT chest in Aug showed the developing adjacent part solid nodule measuring 29x28 mm in transverse by AP dimensions.  She underwent EGD EUS biopsy with Dr. Wilkerson on 10/24/2024 and was found to have non-small cell carcinoma, favor adenocarcinoma.  Her case was discussed at  on 10/31/2024.  There was possible mediastinal invasion.  She was referred to see radiation (Dr. Wernicke) and med oncology (Dr. Parson). She had her EBUS biopsy on 11/11/2024 with Dr. Salinas and Brain MRI on 11/18/2024, presenting today for surgical consultation. Patient reports in her usual health status without unintentional weight loss, headache, anorexia, fatigue, chest pain, dyspnea, or hemoptysis. Stopped prednisone recently.    Patient feels better since starting medications to treat her COPD. Patient's chest radiology reviewed with daughter including images and shows 29 mm CHAPARRITA nodule that is FDG avid and consistent with biopsy proven carcinoma. The patient would like it out. There is a question of mediastinal invasion which would make it T4. Given her desire for removal, we will plan for an operative exploration to see if there is invasion. If there is no invasion, we will plan for a CHAPARRITA wedge for resection. The risks and benefits of the Left Robotic Assisted/VATS CHAPARRITA wedge resection, possible lobectomy, possible thoracotomy procedure were discussed at length including but not limited to risks of infection, bleeding, need for thoracotomy, arrhythmias, thromboembolic complications, myocardial infarction, need for repetitive procedures, recurrent laryngeal nerve injury leading to hoarseness, as well as risks of anesthesia. Specific risks discussed included risk of recurrence, prolonged air leak, need for chest tube drainage and postoperative oxygen, need for adjuvant therapy, and the need for surveillance.  She wishes to proceed. We will need repeat PFTs and a cardiology clearance prior to the procedure.  Plan: - Left Robotic Assisted/VATS CHAPARRITA wedge resection, possible lobectomy, possible thoracotomy procedure - PFTs - Cardiology clearance  I, Dr. Lyndsey Garland MD, personally performed the evaluation and management (E/M) services for this new patient.  That E/M includes conducting the clinically appropriate initial history &/or exam, assessing all conditions, and establishing the plan of care.  I have also independently reviewed the medical records and imaging at the time of this office visit, and discussed the above mentioned images with interpretations with the patient. Today, my DAVIDA Fidelia Madden, was here to observe &/or participate in the visit & follow plan of care established by me.

## 2024-11-26 NOTE — HISTORY OF PRESENT ILLNESS
[Disease: _____________________] : Disease: [unfilled] [T: ___] : T[unfilled] [N: ___] : N[unfilled] [M: ___] : M[unfilled] [AJCC Stage: ____] : AJCC Stage: [unfilled] [de-identified] : 61F former smoker (27py, quit ~17y ago) referred by Dr. Nieves after surveillance CT scans showing interval growth of CHAPARRITA lesion with biopsy showing lung adenocarcinoma, s/p EBUS 24 with results pending and MRI scheduled for 24, here for follow up.  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 Nieves for her COPD/atopic asthma. Given her smoking history, she also has been under surveillance for a lung nodule that has increased in size.    CT Chest 24 Left upper lobe 0.8 cm subsolid nodule contiguous with and adjacent to an emphysematous cystic air space medially, which demonstrates irregular nodular wall thickening as described above. The exact etiology is unclear, but one differential diagnostic consideration is for primary lung neoplasm. Recommend correlation with PET/CT. No evidence of pulmonary fibrosis as clinically questioned.  CT Chest 24 When compared to examination dated 2024 there has been interval development of a part solid nodule measuring 29 x 28 mm in transverse by AP dimensions (9:67) within the apical posterior segment of the left upper lobe. The solid component has increased in size measuring 26 x 12 mm as above. Further imaging to include whole-body PET/CT is suggested to exclude developing adenocarcinoma. Substantial paraseptal and moderate centrilobular emphysema again noted. Multiple stable subcentimeter as well as solid and calcified micronodules as described above.  PET-CT 24 Abnormal skull-to-thigh FDG-PET/CT scan. 1. An intensely FDG avid irregular solid nodule in the paramediastinal left lung apex, compatible with lung malignancy. No hypermetabolic hilar or mediastinal nodes. 2. New superior endplate deformities in T12 and L2 with increased activity may reflect recent trauma; please correlate clinically. 3. No suspicious FDG avid malignant tissue in the remaining field of view.  Lung, CHAPARRITA FNA Pathology 10/24/24 POSITIVE FOR MALIGNANT CELLS. Non- small cell carcinoma, favor adenocarcinoma - see Note. The cell block shows scant similar findings.  EBUS 24 ALL ln NEGATIVE  MRI Brain 24 - No evidence of intracranial metastasis. [de-identified] : Adenocarcinoma - PDL1 40-50%, KRAS G12C [de-identified] : Pulm:  CTSx:  [de-identified] : No complaints today.

## 2024-11-29 NOTE — BEGINNING OF VISIT
[0] : 2) Feeling down, depressed, or hopeless: Not at all (0) [PHQ-2 Negative] : PHQ-2 Negative [Pain Scale: ___] : On a scale of 1-10, today the patient's pain is a(n) [unfilled]. [Former] : Former [20 or more] : 20 or more [> 15 Years] : > 15 Years [Date Discussed (MM/DD/YY): ___] : Discussed: [unfilled] [Patient/Caregiver not ready to engage] : Patient/Caregiver not ready to engage [Abdominal Pain] : no abdominal pain [Vomiting] : no vomiting [Constipation] : no constipation

## 2024-12-05 NOTE — PHYSICAL EXAM
[No Acute Distress] : no acute distress [Normal Conjunctiva] : normal conjunctiva [Normal Venous Pressure] : normal venous pressure [Normal] : normal S1, S2, no murmur, no rub, no gallop [Soft] : abdomen soft [Non Tender] : non-tender [No Masses/organomegaly] : no masses/organomegaly [No Edema] : no edema [Normal Radial B/L] : normal radial B/L [de-identified] : thyroid mildly enlarged diffusely [de-identified] : poor air movement [de-identified] : distended  distended

## 2024-12-05 NOTE — REASON FOR VISIT
[FreeTextEntry1] : New patient here for pre-operative evaluation prior to planned thoracic surgery with Dr. Garland, scheduled for December 10. Longstanding history of cigarette smoking, which she discontinued 15 years ago, leading to severe COPD. Found to have lung nodule on screening and subsequent work-up identified adenocarcinoma. Now scheduled for VATS and possible lobectomy. No prior cardiac history or recent cardiovascular testing. She has HTN and DM, attributed to longstanding steroid therapy for rheumatoid arthritis. No family hx of premature CHD; no hx of elevated cholesterol. Able to walk 4-6 blocks, Limited by exertional dyspnea, which she reported as improved with recent therapies. No chest pain, palpitations, lightheadedness. Does not engage in any more physically challenging activities. Chronic mild resting tachycardia (lifelong).  Had  surgery about 15 years ago with general anesthesia; no complications.

## 2024-12-18 NOTE — HISTORY OF PRESENT ILLNESS
[Former] : former [>= 20 pack years] : >= 20 pack years [Rheumatologic] : rheumatologic [TextBox_4] : 61F (accomapnied by her daughter: She) former smokers (smoked about one pack a day for 27 years, quit 17 years ago) with COPD (atopic phenotype) and ICU admissions in the past (never intubated) referred to pulm to establish care. At initial visit was using symbicort and albuterol plus chest vest for airway clearance twice a day. Could only walk maximum 1-2 blocks before she gets short of breath and was chronically on 60mg prednisone daily for many years, and bactrim qd. She is commonly exposed to people who smoke cigarettes which also triggers her COPD. Has RA. Follows with Dr. Dotty Avila. Currently on hydroxychloraquine 100-200mg once a day.  PFTs obtained, demonstrated mod obstruction and restriction, mod reduced DLCO, no BDR. Decreased 6MWT to 252 meters without desaturation. No hypercapnia on ABG. Found to have elevated IgE (411) while ON prednisone 60mg. She was escalated to triple therapy (incruse/symbicort) + azithromycin and instructed to attempt very slow prednisone wean. but had difficulty and thus started on fasenra.  Also sent for CT chest given symptoms and high risk malignancy, found to have CHAPARRITA nodule now confirmed to be adenoca via EUS bx of apical CHAPARRITA nodule, now s/p CHAPARRITA wedge resection, path with some suggestion of mediastinal invasion, T3N0.  COPD: Inhaler: Symbicort 160-4.5, albuterol Last exacerbation: 3/3/24  # of exacerbations since initiating Fasenra: 0 Bicarb 24 (3/24) in [facility]   12-18-24 status post left VATS, robotic assisted, left upper lobe wedge resection, MLND    Surgery Date: 12/10/24  Postop hospitalization complicated with persist air leak. Patient was discharged home on 12/13/2024 with a Pneumostat. Had severe pain w/ chest tube over the last 2 days, yesterday into today took at least 9 pills of extra strength tylenol, concerning for accidental tylenol overdose. Pain now improved with removal of tube, although still present. Anxious due to not sleeping for 2 days because of pain. Mild cough that is productive since surgery, but no wheezing.  Surgical pathology: - pT3N0, invasive solid adenocarcinoma. G3, poorly differentiated. - Size of invasive component 1.2cm. Visceral pleura invasion present. Direct invasion of adjacent structures present. Involved Adjacent Structures: Mediastinal region of parietal pleura. Spread Through Air Spaces (REGLA): Not identified - All margins negative - PDL1 and NGS pending. [YearQuit] : 2006

## 2024-12-18 NOTE — HISTORY OF PRESENT ILLNESS
[Former] : former [>= 20 pack years] : >= 20 pack years [Rheumatologic] : rheumatologic [TextBox_4] : 61F (accomapnied by her daughter: She) former smokers (smoked about one pack a day for 27 years, quit 17 years ago) with COPD (atopic phenotype) and ICU admissions in the past (never intubated) referred to pulm to establish care. At initial visit was using symbicort and albuterol plus chest vest for airway clearance twice a day. Could only walk maximum 1-2 blocks before she gets short of breath and was chronically on 60mg prednisone daily for many years, and bactrim qd. She is commonly exposed to people who smoke cigarettes which also triggers her COPD. Has RA. Follows with Dr. Dotty Avila. Currently on hydroxychloraquine 100-200mg once a day.  PFTs obtained, demonstrated mod obstruction and restriction, mod reduced DLCO, no BDR. Decreased 6MWT to 252 meters without desaturation. No hypercapnia on ABG. Found to have elevated IgE (411) while ON prednisone 60mg. She was escalated to triple therapy (incruse/symbicort) + azithromycin and instructed to attempt very slow prednisone wean. but had difficulty and thus started on fasenra.  Also sent for CT chest given symptoms and high risk malignancy, found to have CHAPARRITA nodule now confirmed to be adenoca via EUS bx of apical CHAPARRITA nodule, now s/p CHAPARRITA wedge resection, path with some suggestion of mediastinal invasion, T3N0.  COPD: Inhaler: Symbicort 160-4.5, albuterol Last exacerbation: 3/3/24  # of exacerbations since initiating Fasenra: 0 Bicarb 24 (3/24) in [facility]   12-18-24 status post left VATS, robotic assisted, left upper lobe wedge resection, MLND    Surgery Date: 12/10/24  Postop hospitalization complicated with persist air leak. Patient was discharged home on 12/13/2024 with a Pneumostat. Had severe pain w/ chest tube over the last 2 days, yesterday into today took at least 9 pills of extra strength tylenol, concerning for accidental tylenol overdose. Pain now improved with removal of tube, although still present. Anxious due to not sleeping for 2 days because of pain. Mild cough that is productive since surgery, but no wheezing.  Surgical pathology: - pT3N0, invasive solid adenocarcinoma. G3, poorly differentiated. - Size of invasive component 1.2cm. Visceral pleura invasion present. Direct invasion of adjacent structures present. Involved Adjacent Structures: Mediastinal region of parietal pleura. Spread Through Air Spaces (REGLA): Not identified - All margins negative - PDL1 and NGS pending. [YearQuit] : 2006

## 2024-12-18 NOTE — CONSULT LETTER
[Dear  ___] : Dear  [unfilled], [Consult Letter:] : I had the pleasure of evaluating your patient, [unfilled]. [Please see my note below.] : Please see my note below. [Consult Closing:] : Thank you very much for allowing me to participate in the care of this patient.  If you have any questions, please do not hesitate to contact me. [FreeTextEntry2] : Dr. Bernabe Chogn

## 2024-12-18 NOTE — ASSESSMENT
[FreeTextEntry1] : 61F (accomapnied by her daughter: She) former smokers (smoked about one pack a day for 27 years, quit 17 years ago) with COPD and ICU admissions in the past (never intubated) here today to follow up eosinophilic COPD and lung nodule found to be adenoca now s/p CHAPARRITA wedge resection  Data review:  Pathology 12/2025  COPD data: Inhaler: Symbicort 160-4.5, Incruse ellipta, albuterol Last exacerbation: 3/3/24  # of exacerbations since initiating Fasenra: 0 (previously 5) Bicarb 24 (3/24) in [facility] PFTs 4/12/2024: mod obstruction/restriction and mod decrease DLCO, no BD response  6MWT 4/12/2024: 252 meters, no desat on RA, [place] 7  ABG 4/19/24: no evidence of hypercapnia  IgE 3/26/24: 411 (ON Prednisone 60mg)  #COPD #Emphysema #chronic bronchitis #hypereosinophilia #Lung adenoca  Previously group E COPD. Had significant improvement on Fasenra and triple therapy (Incruse, Breo), and azithromycin. Has been off steroids for months without any recurrent exacerbation, excellent response to biologic. Now recovering from CHAPARRITA wedge resection and with severe pain at prior chest tube site, accidental ingestion of tylenol (9g? at least?).   Will send to ED for evaluation of LFTs, may need admission to monitor for tylenol toxicity. If stable and can be discharged home, will f/u with Dr. Parson tomorrow to discuss chemotherapy for T3N0 adeno.   No changes made to her COPD regimen, will have her repeat 6MWT in 1 month to evaluate recovery post op. Notified ER, Dr. Parson, and Dr. Garland re: tylenol ingestion and sending pt to ER.

## 2024-12-19 NOTE — ASSESSMENT
[FreeTextEntry1] : 60 yo F with smoking history, who quit 17 yrs ago here for follow up of KRAS G12C, PDL1 40-50%, NSCLC, stage IIB, s/p wedge plus MLND on 12/10/2024 with . Poorly differentiated, margins negative, direct invasion of mediastinum and visceral pleural invasion.  NSCLC - T3N0M0 - Stage IIB All lymph node samples during resection were negative for carcinoma, however there was direct invasion of mediastinum and pleura, for this reason patient up stage II T3 N0.  Per NCCN guidelines this requires adjuvant systemic therapy.  No role of radiation therapy, given margins are negative. --We will present her case at multidisciplinary thoracic tumor board as well --We discussed standard adjuvant systemic therapy for her histology, the preferred is cisplatin/pemetrexed x 4 cycles.  We discussed, risks, benefits, schedule, side effect profile.  Risks include but are not limited to MARIANELA, kidney injury, hearing loss, neuropathy, cytopenias, sepsis and death.  Prior to starting therapy her hearing test is needed. Educational materials provided, informed consent signed. --The patient has been taking Tylenol, 9 g 3 days ago in a 24-hour..  CMP done yesterday in the ER was normal.  CMP repeated today. --ordered hearing test --ordered CMP -- Patient was advised to take folic acid 1 mg daily, B12 1000 mcg daily, B12 1000 mcg intramuscular injection every 9 weeks --Dexamethasone 4 mg twice daily day before, day off chemotherapy, and on day 2, 3, 4 after chemotherapy to minimize skin reactions and refractory nausea --Zofran as needed for nausea --Olanzapine 5 mg at bedtime to be taken on day 2, 3, 4 after chemotherapy --Plan to schedule her for B12 injection on 1/7, with first chemotherapy on 1/14, provided her hearing test is within normal range

## 2024-12-19 NOTE — REASON FOR VISIT
[Follow-Up Visit] : a follow-up [Family Member] : family member [FreeTextEntry2] : NSCLC - adenocarcinoma

## 2024-12-19 NOTE — REASON FOR VISIT
[Spouse] : spouse [Family Member] : family member [de-identified] : left VATS, robotic assisted, left upper lobe wedge resection, MLND [de-identified] : 12/10/24 [de-identified] : 1 [de-identified] :   Postop hospitalization complicated with persist air leak.  Patient was discharged home on 12/13/2024 with a Pneumostat.   Surgical pathology:  - pT3N0, invasive solid adenocarcinoma. G3, poorly differentiated.   - Size of invasive component 1.2cm.  Visceral pleura invasion present. Direct invasion of adjacent structures present. Involved Adjacent Structures:  Mediastinal region of parietal pleura.  Spread Through Air Spaces (REGLA):    Not identified - All margins negative  - PDL1 and NGS pending.    Patient presents today with a CXR for the 1st postoperative visit. Patient reports 8-10/10 pain to the left chest wall, worse at the chest tube site.  She takes oxycodone 5mg Q6hrs, alternates with acetaminophen. She also has a productive cough, with clear phlegm.  Denies fever or chills.

## 2024-12-19 NOTE — REASON FOR VISIT
[Spouse] : spouse [Family Member] : family member [de-identified] : left VATS, robotic assisted, left upper lobe wedge resection, MLND [de-identified] : 12/10/24 [de-identified] : 1 [de-identified] :   Postop hospitalization complicated with persist air leak.  Patient was discharged home on 12/13/2024 with a Pneumostat.   Surgical pathology:  - pT3N0, invasive solid adenocarcinoma. G3, poorly differentiated.   - Size of invasive component 1.2cm.  Visceral pleura invasion present. Direct invasion of adjacent structures present. Involved Adjacent Structures:  Mediastinal region of parietal pleura.  Spread Through Air Spaces (REGLA):    Not identified - All margins negative  - PDL1 and NGS pending.    Patient presents today with a CXR for the 1st postoperative visit. Patient reports 8-10/10 pain to the left chest wall, worse at the chest tube site.  She takes oxycodone 5mg Q6hrs, alternates with acetaminophen. She also has a productive cough, with clear phlegm.  Denies fever or chills.

## 2024-12-19 NOTE — ASSESSMENT
[FreeTextEntry1] : Patient is a 62 yo female, former smoker (1ppd x 27 yrs, quit 15rs ago) and Rastafarian with a PMHx of COPD, RA, T2DM, HLD, hypothyroidism, glaucoma and cataracts who was referred by Dr. Yvette Nieves to Dr. Garland for biopsy prove lung adenocarcinoma.   She was found to have a CHAPARRITA subsolid nodule in May 2024 and had biopsy on October 10/24 showing non-small cell carcinoma, favoring adenocarcinoma.   On 12/10/2024, she underwent L VATS, RA CHAPARRITA wedge resection with MLND, and was discharged home with a pneumostat.  Pt presents today with family for the 1st postop visit.   Surgical pathology was reviewed with the patient and her family: Stage IIB, T3N0 invasive solid adenocarcinoma, all margins negative. Patient should continue treatment with oncologist Dr. Parson.   Her incisional pain is not well controlled with oxycodone 5mg [id number]. Will Rx hydromorphone 2mg PRN for severe pain. Educated the patient that both oxycodone and hydromorphone can cause sedation or altered mental status, should avoid taking together.    We removed the pneumostat smoothly in the office after confirming no air leak.  CXR after the chest tube removal showed no significant pneumothorax.  Patient should return to office in two weeks for stability and suture removal.   Plan: - Rx hydromorphone - RTO in 2 weeks - Follow up oncology  I, Dr. Lyndsey Garland MD, personally performed the evaluation and management (E/M) services for this established patient who presents today with (a) new problem(s)/exacerbation of (an) existing condition(s). That E/M includes conducting the clinically appropriate interval history &/or exam, assessing all new/exacerbated conditions, and establishing a new plan of care. I have also independently reviewed the medical records and imaging at the time of this office visit, and discussed the above mentioned images with interpretations with the patient. Today, my DAVIDA Fidelia Madden, was here to observe &/or participate in the visit & follow plan of care established by me.

## 2024-12-19 NOTE — DISCUSSION/SUMMARY
[Poor Control] : has poor pain control [No Sign of Infection] : is showing no signs of infection [Remove Sutures/Staples] : remove sutures/staples [de-identified] : remove chest tube

## 2024-12-19 NOTE — REASON FOR VISIT
[Spouse] : spouse [Family Member] : family member [de-identified] : left VATS, robotic assisted, left upper lobe wedge resection, MLND [de-identified] : 12/10/24 [de-identified] : 1 [de-identified] :   Postop hospitalization complicated with persist air leak.  Patient was discharged home on 12/13/2024 with a Pneumostat.   Surgical pathology:  - pT3N0, invasive solid adenocarcinoma. G3, poorly differentiated.   - Size of invasive component 1.2cm.  Visceral pleura invasion present. Direct invasion of adjacent structures present. Involved Adjacent Structures:  Mediastinal region of parietal pleura.  Spread Through Air Spaces (REGLA):    Not identified - All margins negative  - PDL1 and NGS pending.    Patient presents today with a CXR for the 1st postoperative visit. Patient reports 8-10/10 pain to the left chest wall, worse at the chest tube site.  She takes oxycodone 5mg Q6hrs, alternates with acetaminophen. She also has a productive cough, with clear phlegm.  Denies fever or chills.

## 2024-12-19 NOTE — ASSESSMENT
[FreeTextEntry1] : Patient is a 60 yo female, former smoker (1ppd x 27 yrs, quit 15rs ago) and Rastafari with a PMHx of COPD, RA, T2DM, HLD, hypothyroidism, glaucoma and cataracts who was referred by Dr. Yvette Nieves to Dr. Garland for biopsy prove lung adenocarcinoma.   She was found to have a CHAPARRITA subsolid nodule in May 2024 and had biopsy on October 10/24 showing non-small cell carcinoma, favoring adenocarcinoma.   On 12/10/2024, she underwent L VATS, RA CHAPARRITA wedge resection with MLND, and was discharged home with a pneumostat.  Pt presents today with family for the 1st postop visit.   Surgical pathology was reviewed with the patient and her family: Stage IIB, T3N0 invasive solid adenocarcinoma, all margins negative. Patient should continue treatment with oncologist Dr. Parson.   Her incisional pain is not well controlled with oxycodone 5mg [id number]. Will Rx hydromorphone 2mg PRN for severe pain. Educated the patient that both oxycodone and hydromorphone can cause sedation or altered mental status, should avoid taking together.    We removed the pneumostat smoothly in the office after confirming no air leak.  CXR after the chest tube removal showed no significant pneumothorax.  Patient should return to office in two weeks for stability and suture removal.   Plan: - Rx hydromorphone - RTO in 2 weeks - Follow up oncology  I, Dr. Lyndsey Garland MD, personally performed the evaluation and management (E/M) services for this established patient who presents today with (a) new problem(s)/exacerbation of (an) existing condition(s). That E/M includes conducting the clinically appropriate interval history &/or exam, assessing all new/exacerbated conditions, and establishing a new plan of care. I have also independently reviewed the medical records and imaging at the time of this office visit, and discussed the above mentioned images with interpretations with the patient. Today, my DAVIDA Fidelia Madden, was here to observe &/or participate in the visit & follow plan of care established by me.

## 2024-12-19 NOTE — ASSESSMENT
[FreeTextEntry1] : Patient is a 62 yo female, former smoker (1ppd x 27 yrs, quit 15rs ago) and [employer] with a PMHx of COPD, RA, T2DM, HLD, hypothyroidism, glaucoma and cataracts who was referred by Dr. Yvette Nieves to Dr. Garland for biopsy prove lung adenocarcinoma.   She was found to have a CHAPARRITA subsolid nodule in May 2024 and had biopsy on October 10/24 showing non-small cell carcinoma, favoring adenocarcinoma.   On 12/10/2024, she underwent L VATS, RA CHAPARRITA wedge resection with MLND, and was discharged home with a pneumostat.  Pt presents today with family for the 1st postop visit.   Surgical pathology was reviewed with the patient and her family: Stage IIB, T3N0 invasive solid adenocarcinoma, all margins negative. Patient should continue treatment with oncologist Dr. Parson.   Her incisional pain is not well controlled with oxycodone 5mg H5dniye. Will Rx hydromorphone 2mg PRN for severe pain. Educated the patient that both oxycodone and hydromorphone can cause sedation or altered mental status, should avoid taking together.    We removed the pneumostat smoothly in the office after confirming no air leak.  CXR after the chest tube removal showed no significant pneumothorax.  Patient should return to office in two weeks for stability and suture removal.   Plan: - Rx hydromorphone - RTO in 2 weeks - Follow up oncology  I, Dr. Lyndsey Garland MD, personally performed the evaluation and management (E/M) services for this established patient who presents today with (a) new problem(s)/exacerbation of (an) existing condition(s). That E/M includes conducting the clinically appropriate interval history &/or exam, assessing all new/exacerbated conditions, and establishing a new plan of care. I have also independently reviewed the medical records and imaging at the time of this office visit, and discussed the above mentioned images with interpretations with the patient. Today, my DAVIDA Fidelia Madden, was here to observe &/or participate in the visit & follow plan of care established by me.

## 2024-12-19 NOTE — DISCUSSION/SUMMARY
[Poor Control] : has poor pain control [No Sign of Infection] : is showing no signs of infection [Remove Sutures/Staples] : remove sutures/staples [de-identified] : remove chest tube

## 2024-12-19 NOTE — COUNSELING
[No Heavy Lifting] : no heavy lifting (>15-20 lb. for 1 month or 25 lb. for 3 months from date of surgery) [S/S of infection] : signs and symptoms of infection (and to whom it should be reported) [Medication/Vitamin/Herb/Food Interaction] : medication/vitamin/herb/food interaction

## 2024-12-19 NOTE — HISTORY OF PRESENT ILLNESS
[Disease: _____________________] : Disease: [unfilled] [T: ___] : T[unfilled] [N: ___] : N[unfilled] [M: ___] : M[unfilled] [AJCC Stage: ____] : AJCC Stage: [unfilled] [de-identified] : 61F former smoker (27py, quit ~17y ago) referred by Dr. Nieves after surveillance CT scans showing interval growth of CHAPARRITA lesion with biopsy showing lung adenocarcinoma, s/p EBUS 24 with results pending and MRI scheduled for 24, here for follow up.  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 Nieves for her COPD/atopic asthma. Given her smoking history, she also has been under surveillance for a lung nodule that has increased in size.    CT Chest 24 Left upper lobe 0.8 cm subsolid nodule contiguous with and adjacent to an emphysematous cystic air space medially, which demonstrates irregular nodular wall thickening as described above. The exact etiology is unclear, but one differential diagnostic consideration is for primary lung neoplasm. Recommend correlation with PET/CT. No evidence of pulmonary fibrosis as clinically questioned.  CT Chest 24 When compared to examination dated 2024 there has been interval development of a part solid nodule measuring 29 x 28 mm in transverse by AP dimensions (9:67) within the apical posterior segment of the left upper lobe. The solid component has increased in size measuring 26 x 12 mm as above. Further imaging to include whole-body PET/CT is suggested to exclude developing adenocarcinoma. Substantial paraseptal and moderate centrilobular emphysema again noted. Multiple stable subcentimeter as well as solid and calcified micronodules as described above.  PET-CT 24 Abnormal skull-to-thigh FDG-PET/CT scan. 1. An intensely FDG avid irregular solid nodule in the paramediastinal left lung apex, compatible with lung malignancy. No hypermetabolic hilar or mediastinal nodes. 2. New superior endplate deformities in T12 and L2 with increased activity may reflect recent trauma; please correlate clinically. 3. No suspicious FDG avid malignant tissue in the remaining field of view.  Lung, CHAPARRITA FNA Pathology 10/24/24 POSITIVE FOR MALIGNANT CELLS. Non- small cell carcinoma, favor adenocarcinoma - see Note. The cell block shows scant similar findings.  EBUS 24 ALL ln NEGATIVE  MRI Brain 24 - No evidence of intracranial metastasis.  12/10/2024: Pathology: Final Diagnosis  1.  Lung, left upper lobe with mediastinal pleura, resection: -   Poorly differentiated adenocarcinoma, solid (90%) and acinar (10%) patterns -   1.2 cm greatest dimension -   Resection edge negative for adenocarcinoma -   Fibrous adhesions with infiltrating adenocarcinoma present, see note -   Negative for lymphovascular invasion -   Negative for REGLA -   Emphysematous changes of surrounding lung -   See synoptic report below  Note: There is extensive fibrosis and inflammation surrounding the tumor. IHC workup reveals the malignant cells are positive for TTF-1.  They are negative for Napsin and p40.  Per Dr. MAURA Garland, the tumor was adhered to the mediastinal pleura.  Fragments of fibrous adhesions containing infiltrating adenocarcinoma are present in the specimen container (grossly described as free-floating tissue).  Because adenocarcinoma is present in the adhesions, this lesion will be staged as invading the mediastinal region of the parietal  pleura (pT3).  This case is discussed with Dr. Garland on 2024.  A portion the specimen will be sent for PD-L1 IHC and molecular testing.  2.  Lung, left upper lobe, additional margin excision: -   Negative for carcinoma -   Emphysematous change and subpleural bleb  3.  Lymph node, left level 9, excision: -   1 lymph node negative for metastatic carcinoma, 0/1  4.  Lymph node, level 7, excision: -   1 lymph node negative for metastatic carcinoma, 0/1  5.  Lymph node, left level 10, excision: -   3 lymph nodes negative for metastatic carcinoma, 0/3  6.  Lymph node, left level 5, excision: -   Multiple fragments of lymph node(s) negative for metastatic carcinoma  Verified by: Rickey Cordero M.D. (Electronic Signature) Reported on: 24 16:08 [facility], [facility], [address] [place] [address] Phone: [phone]   Fax: [phone] _________________________________________________________________  Comment mol 1A   Synoptic Summary 1: Lung - Resection Specimen Procedure:  Wedge resection Specimen Laterality:   Left Tumor Tumor Focality:   Single focus Tumor Site:  Upper lobe of lung Tumor Size Total Tumor Size:   1.2 Centimeters (cm) Size of Invasive Component:   1.2 Centimeters (cm) Histologic Type:   Invasive solid adenocarcinoma Histologic Patterns Present:   Acinar - 10%;  Solid - 90% Histologic Grade:   G3, poorly differentiated Spread Through Air Spaces (REGLA):    Not identified Visceral Pleura Invasion:   Present Direct Invasion of Adjacent Structures:    Present Involved Adjacent Structures:   Mediastinal region of parietal pleura Treatment Effect:   No known presurgical therapy Lymphovascular Invasion:   Not identified Margins Margin Status for Invasive Carcinoma:    All margins negative for invasive carcinoma Closest Margin(s) to Invasive Carcinoma:    Parenchymal Distance from Invasive Carcinoma to Closest Margin:    Greater than 0.8 cm Margin Status for Non-Invasive Tumor:    Not applicable Regional Lymph Nodes Lymph Node(s) from Prior Procedures:    No known prior lymph node sampling performed Regional Lymph Node Status:   All regional lymph nodes negative for tumor Number of Lymph Nodes Examined:   At least 6 Amira Site(s) Examined:   7: Subcarinal;  5: Subaortic / aortopulmonary (AP) / AP window;  9L: Pulmonary ligament; 10L: Hilar Pathologic Stage Classification (pTNM, AJCC 8th Edition) pT Category:   pT3 pN Category:   pN0 Best Tumor Blocks for Future Studies Tumor Block(s):   1A  [de-identified] : Adenocarcinoma - PDL1 40-50%, KRAS G12C [de-identified] : Pulm:  CTSx:  [de-identified] : No complaints today. Since last visit she underwent a wedge resection and mediastinal lymph node dissection with Dr. Garland on 12/10. Final pathology resulted as T3 lesion, not due to size, size was only 1.2 cm, but it was invading the mediastinum, therefore final stage was T3N0M0.

## 2024-12-19 NOTE — DISCUSSION/SUMMARY
[Poor Control] : has poor pain control [No Sign of Infection] : is showing no signs of infection [Remove Sutures/Staples] : remove sutures/staples [de-identified] : remove chest tube

## 2024-12-19 NOTE — PHYSICAL EXAM
[Bibasilar Rales/Crackles] : bibasilar rales [Tachycardic ___] : the heart rate was tachycardic at [unfilled] bpm [Regular] : the rhythm was regular [Clean] : clean [Dry] : dry [Healing Well] : healing well

## 2025-01-02 NOTE — REASON FOR VISIT
[de-identified] :  left VATS, robotic assisted, left upper lobe wedge resection, MLND  [de-identified] : 12/10/2024 [de-identified] : 3 [de-identified] :   Postop hospitalization complicated with persist air leak. Patient was discharged home on 12/13/2024 with a Pneumostat.     Surgical pathology: Stage IIB, pT3N0, invasive solid adenocarcinoma.    Her Pneumostat was removed in office on 12/18/2024. She c/o moderate to severe incisional pain during her visit on 12/18/2024, a course of hydromorphone was prescribed. She was also evaluated in ED on 12/18 for potential acetaminophen overdose (took 9gm on 12/17/2024). LFT in ED was normal.     Patient presents today with a CXR for the 2nd postoperative visit.

## 2025-01-02 NOTE — ASSESSMENT
[FreeTextEntry1] : Patient is a 62 yo female, former smoker (1ppd x 27 yrs, quit 15rs ago) and Religion with a PMHx of COPD, RA, T2DM, HLD, hypothyroidism, glaucoma and cataracts who was referred by Dr. Yvette Nieves to Dr. Garland for biopsy prove lung adenocarcinoma.  On 12/10/2024, she underwent L VATS, RA CHAPARRITA wedge resection with MLND for Stage IIB, T3N0, invasive solid adenocarcinoma. Pt was evaluated by Oncologist Dr. Parson, planning for adjuvant systemic treatment.   She was last saw in office on 12/18/2024 and removed her pneumostat. Her follow-up CXR on the same day showed no evidence of pneumothorax, pleural effusion or atelectasis.  Patient had severe pain and took acetaminophen 9gram on 12/17. She was sent to ED by [facility] for evaluation. Her LFT was wnl.   She presents today for the 2nd postop visit with a CXR.   She feels well except for some pain. Her incisions are CDI and her CXR is stable. She will followup with Dr. Parson for chemotherapy and wit us to review followup imaging after chemotherapy.  Plan: Followup with Dr. Parson for chemotherapy Followup with us after imaging with Dr. Parson  I, Dr. Lyndsey Garland MD, personally performed the evaluation and management (E/M) services for this established patient who presents today with (a) new problem(s)/exacerbation of (an) existing condition(s).  That E/M includes conducting the clinically appropriate interval history &/or exam, assessing all new/exacerbated conditions, and establishing a new plan of care. I have also independently reviewed the medical records and imaging at the time of this office visit, and discussed the above mentioned images with interpretations with the patient. Today, my DAVIDA was here to observe &/or participate in the visit & follow plan of care established by me.  Total time of 15 minutes with > 50% spent with the patient discussing postoperative course.

## 2025-01-02 NOTE — ASSESSMENT
[FreeTextEntry1] : Patient is a 60 yo female, former smoker (1ppd x 27 yrs, quit 15rs ago) and L[employer] day with a PMHx of COPD, RA, T2DM, HLD, hypothyroidism, glaucoma and cataracts who was referred by Dr. Yvette Nieves to Dr. Garland for biopsy prove lung adenocarcinoma.  On 12/10/2024, she underwent L VATS, RA CHAPARRITA wedge resection with MLND for Stage IIB, T3N0, invasive solid adenocarcinoma. Pt was evaluated by Oncologist Dr. Parson, planning for adjuvant systemic treatment.   She was last saw in office on 12/18/2024 and removed her pneumostat. Her follow-up CXR on the same day showed no evidence of pneumothorax, pleural effusion or atelectasis.  Patient had severe pain and took acetaminophen 9gram on 12/17. She was sent to ED by [facility] for evaluation. Her LFT was wnl.   She presents today for the 2nd postop visit with a CXR.   She feels well except for some pain. Her incisions are CDI and her CXR is stable. She will followup with Dr. Parson for chemotherapy and wit us to review followup imaging after chemotherapy.  Plan: Followup with Dr. Parson for chemotherapy Followup with us after imaging with Dr. Parson  I, Dr. Lyndsey Garland MD, personally performed the evaluation and management (E/M) services for this established patient who presents today with (a) new problem(s)/exacerbation of (an) existing condition(s).  That E/M includes conducting the clinically appropriate interval history &/or exam, assessing all new/exacerbated conditions, and establishing a new plan of care. I have also independently reviewed the medical records and imaging at the time of this office visit, and discussed the above mentioned images with interpretations with the patient. Today, my DAVIDA was here to observe &/or participate in the visit & follow plan of care established by me.  Total time of 15 minutes with > 50% spent with the patient discussing postoperative course.

## 2025-01-02 NOTE — REASON FOR VISIT
[de-identified] :  left VATS, robotic assisted, left upper lobe wedge resection, MLND  [de-identified] : 12/10/2024 [de-identified] : 3 [de-identified] :   Postop hospitalization complicated with persist air leak. Patient was discharged home on 12/13/2024 with a Pneumostat.     Surgical pathology: Stage IIB, pT3N0, invasive solid adenocarcinoma.    Her Pneumostat was removed in office on 12/18/2024. She c/o moderate to severe incisional pain during her visit on 12/18/2024, a course of hydromorphone was prescribed. She was also evaluated in ED on 12/18 for potential acetaminophen overdose (took 9gm on 12/17/2024). LFT in ED was normal.     Patient presents today with a CXR for the 2nd postoperative visit.

## 2025-01-16 NOTE — VITALS
[Maximal Pain Intensity: 10/10] : 10/10 [Least Pain Intensity: 0/10] : 0/10 [70: Cares for self; unalbe to carry on normal activity or do active work.] : 70: Cares for self; unable to carry on normal activity or do active work. [Date: ____________] : Patient's last distress assessment performed on [unfilled]. [10 - Extreme Distress] : Distress Level: 10 [Referred Patient  to social work for follow-up] : Patient was referred to social work for follow-up

## 2025-01-16 NOTE — REVIEW OF SYSTEMS
[Shortness Of Breath] : shortness of breath [Cough] : cough [Negative] : Neurological [Confused] : no confusion [Dizziness] : no dizziness [FreeTextEntry9] : ambulates well, uses rolling walker [FreeTextEntry3] : vision worsening in both eyes due to glaucoma [de-identified] : denies headaches

## 2025-01-16 NOTE — ASSESSMENT
[FreeTextEntry1] : 62 yo F with smoking history, who quit 17 yrs ago here for follow up of KRAS G12C, PDL1 40-50%, NSCLC, stage IIB, s/p wedge plus MLND on 12/10/2024 with .   NSCLC - T4N0M0 All lymph node samples during resection were negative for carcinoma, however there was direct invasion of mediastinum and pleura, which makes the patient T4, not T3, and also pathologically margins were positive and not negative as initially stated, therefore treatment plan has changed after multidisciplinary tumor board discussion today. --Plan for concurrent chemoradiation therapy Given easier tolerance I recommend carboplatin/Taxol given weekly.  We discussed risk, benefits and side effect profile today.  The patient consents to treatment.  Educational materials provided.  Informed consent signed. --Refer to Dr. Dunn to plan for postoperative chemoradiation therapy given margins cannot be re-excised --Zofran as needed for nausea, no other premeds or postmeds needed Tentative plan to start CCRT on 2/4/2025

## 2025-01-16 NOTE — HISTORY OF PRESENT ILLNESS
[Disease: _____________________] : Disease: [unfilled] [T: ___] : T[unfilled] [N: ___] : N[unfilled] [M: ___] : M[unfilled] [AJCC Stage: ____] : AJCC Stage: [unfilled] [de-identified] : 61F former smoker (27py, quit ~17y ago) referred by Dr. Nieves after surveillance CT scans showing interval growth of CHAPARRITA lesion with biopsy showing lung adenocarcinoma, s/p EBUS 24 with results pending and MRI scheduled for 24, here for follow up.  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 Nieves for her COPD/atopic asthma. Given her smoking history, she also has been under surveillance for a lung nodule that has increased in size.    CT Chest 24 Left upper lobe 0.8 cm subsolid nodule contiguous with and adjacent to an emphysematous cystic air space medially, which demonstrates irregular nodular wall thickening as described above. The exact etiology is unclear, but one differential diagnostic consideration is for primary lung neoplasm. Recommend correlation with PET/CT. No evidence of pulmonary fibrosis as clinically questioned.  CT Chest 24 When compared to examination dated 2024 there has been interval development of a part solid nodule measuring 29 x 28 mm in transverse by AP dimensions (9:67) within the apical posterior segment of the left upper lobe. The solid component has increased in size measuring 26 x 12 mm as above. Further imaging to include whole-body PET/CT is suggested to exclude developing adenocarcinoma. Substantial paraseptal and moderate centrilobular emphysema again noted. Multiple stable subcentimeter as well as solid and calcified micronodules as described above.  PET-CT 24 Abnormal skull-to-thigh FDG-PET/CT scan. 1. An intensely FDG avid irregular solid nodule in the paramediastinal left lung apex, compatible with lung malignancy. No hypermetabolic hilar or mediastinal nodes. 2. New superior endplate deformities in T12 and L2 with increased activity may reflect recent trauma; please correlate clinically. 3. No suspicious FDG avid malignant tissue in the remaining field of view.  Lung, CHAPARRITA FNA Pathology 10/24/24 POSITIVE FOR MALIGNANT CELLS. Non- small cell carcinoma, favor adenocarcinoma - see Note. The cell block shows scant similar findings.  EBUS 24 ALL ln NEGATIVE  MRI Brain 24 - No evidence of intracranial metastasis.  12/10/2024: Pathology: Final Diagnosis  1.  Lung, left upper lobe with mediastinal pleura, resection: -   Poorly differentiated adenocarcinoma, solid (90%) and acinar (10%) patterns -   1.2 cm greatest dimension -   Resection edge negative for adenocarcinoma -   Fibrous adhesions with infiltrating adenocarcinoma present, see note -   Negative for lymphovascular invasion -   Negative for REGLA -   Emphysematous changes of surrounding lung -   See synoptic report below  Note: There is extensive fibrosis and inflammation surrounding the tumor. IHC workup reveals the malignant cells are positive for TTF-1.  They are negative for Napsin and p40.  Per Dr. MAURA Garland, the tumor was adhered to the mediastinal pleura.  Fragments of fibrous adhesions containing infiltrating adenocarcinoma are present in the specimen container (grossly described as free-floating tissue).  Because adenocarcinoma is present in the adhesions, this lesion will be staged as invading the mediastinal region of the parietal  pleura (pT3).  This case is discussed with Dr. Garland on 2024.  A portion the specimen will be sent for PD-L1 IHC and molecular testing.  2.  Lung, left upper lobe, additional margin excision: -   Negative for carcinoma -   Emphysematous change and subpleural bleb  3.  Lymph node, left level 9, excision: -   1 lymph node negative for metastatic carcinoma, 0/1  4.  Lymph node, level 7, excision: -   1 lymph node negative for metastatic carcinoma, 0/1  5.  Lymph node, left level 10, excision: -   3 lymph nodes negative for metastatic carcinoma, 0/3  6.  Lymph node, left level 5, excision: -   Multiple fragments of lymph node(s) negative for metastatic carcinoma  Verified by: Rickey Cordero M.D. (Electronic Signature) Reported on: 24 16:08 [facility], [facility], [address] [place] [address] Phone: [phone]   Fax: [phone] _________________________________________________________________  Comment mol 1A   Synoptic Summary 1: Lung - Resection Specimen Procedure:  Wedge resection Specimen Laterality:   Left Tumor Tumor Focality:   Single focus Tumor Site:  Upper lobe of lung Tumor Size Total Tumor Size:   1.2 Centimeters (cm) Size of Invasive Component:   1.2 Centimeters (cm) Histologic Type:   Invasive solid adenocarcinoma Histologic Patterns Present:   Acinar - 10%;  Solid - 90% Histologic Grade:   G3, poorly differentiated Spread Through Air Spaces (REGLA):    Not identified Visceral Pleura Invasion:   Present Direct Invasion of Adjacent Structures:    Present Involved Adjacent Structures:   Mediastinal region of parietal pleura Treatment Effect:   No known presurgical therapy Lymphovascular Invasion:   Not identified Margins Margin Status for Invasive Carcinoma:    All margins negative for invasive carcinoma Closest Margin(s) to Invasive Carcinoma:    Parenchymal Distance from Invasive Carcinoma to Closest Margin:    Greater than 0.8 cm Margin Status for Non-Invasive Tumor:    Not applicable Regional Lymph Nodes Lymph Node(s) from Prior Procedures:    No known prior lymph node sampling performed Regional Lymph Node Status:   All regional lymph nodes negative for tumor Number of Lymph Nodes Examined:   At least 6 Amira Site(s) Examined:   7: Subcarinal;  5: Subaortic / aortopulmonary (AP) / AP window;  9L: Pulmonary ligament; 10L: Hilar Pathologic Stage Classification (pTNM, AJCC 8th Edition) pT Category:   pT4 pN Category:   pN0 Best Tumor Blocks for Future Studies Tumor Block(s):   1A  2025: TTB - - Surgical pathology was reviewed, pleural margin is positive.  [de-identified] : Adenocarcinoma - PDL1 40-50%, KRAS G12C [de-identified] : Positive margins, invasion of parietal pleura [de-identified] : Pulm:  CTSx:  [de-identified] : No complaints today. Since last visit she underwent a wedge resection and mediastinal lymph node dissection with Dr. Garland on 12/10. Final pathology resulted as T3 lesion, not due to size, size was only 1.2 cm, but it was invading the mediastinum. Her case was reviewed at thoracic tumor board today because there was some question on pathology given that clinically the tumor was invading with likely positive margins.  Consensus at tumor board and extensive pathologic review, resulted with positive margins.

## 2025-01-16 NOTE — BEGINNING OF VISIT
[Diarrhea Character] : Diarrhea: Grade 0 [0] : 2) Feeling down, depressed, or hopeless: Not at all (0) [Pain Scale: ___] : On a scale of 1-10, today the patient's pain is a(n) [unfilled]. [Former] : Former [> 15 Years] : > 15 Years [[id number]] : 0 [Future Reassessment of Pain Scale] : Future reassessment of pain scale [Date Discussed (MM/DD/YY): ___] : Discussed: [unfilled] [Reviewed, no changes] : Reviewed, no changes [Abdominal Pain] : no abdominal pain [Vomiting] : no vomiting [Constipation] : no constipation

## 2025-01-16 NOTE — HISTORY OF PRESENT ILLNESS
[Disease: _____________________] : Disease: [unfilled] [T: ___] : T[unfilled] [N: ___] : N[unfilled] [M: ___] : M[unfilled] [AJCC Stage: ____] : AJCC Stage: [unfilled] [de-identified] : 61F former smoker (27py, quit ~17y ago) referred by Dr. Nieves after surveillance CT scans showing interval growth of CHAPARRITA lesion with biopsy showing lung adenocarcinoma, s/p EBUS 24 with results pending and MRI scheduled for 24, here for follow up.  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 Nieves for her COPD/atopic asthma. Given her smoking history, she also has been under surveillance for a lung nodule that has increased in size.    CT Chest 24 Left upper lobe 0.8 cm subsolid nodule contiguous with and adjacent to an emphysematous cystic air space medially, which demonstrates irregular nodular wall thickening as described above. The exact etiology is unclear, but one differential diagnostic consideration is for primary lung neoplasm. Recommend correlation with PET/CT. No evidence of pulmonary fibrosis as clinically questioned.  CT Chest 24 When compared to examination dated 2024 there has been interval development of a part solid nodule measuring 29 x 28 mm in transverse by AP dimensions (9:67) within the apical posterior segment of the left upper lobe. The solid component has increased in size measuring 26 x 12 mm as above. Further imaging to include whole-body PET/CT is suggested to exclude developing adenocarcinoma. Substantial paraseptal and moderate centrilobular emphysema again noted. Multiple stable subcentimeter as well as solid and calcified micronodules as described above.  PET-CT 24 Abnormal skull-to-thigh FDG-PET/CT scan. 1. An intensely FDG avid irregular solid nodule in the paramediastinal left lung apex, compatible with lung malignancy. No hypermetabolic hilar or mediastinal nodes. 2. New superior endplate deformities in T12 and L2 with increased activity may reflect recent trauma; please correlate clinically. 3. No suspicious FDG avid malignant tissue in the remaining field of view.  Lung, CHAPARRITA FNA Pathology 10/24/24 POSITIVE FOR MALIGNANT CELLS. Non- small cell carcinoma, favor adenocarcinoma - see Note. The cell block shows scant similar findings.  EBUS 24 ALL ln NEGATIVE  MRI Brain 24 - No evidence of intracranial metastasis.  12/10/2024: Pathology: Final Diagnosis  1.  Lung, left upper lobe with mediastinal pleura, resection: -   Poorly differentiated adenocarcinoma, solid (90%) and acinar (10%) patterns -   1.2 cm greatest dimension -   Resection edge negative for adenocarcinoma -   Fibrous adhesions with infiltrating adenocarcinoma present, see note -   Negative for lymphovascular invasion -   Negative for REGLA -   Emphysematous changes of surrounding lung -   See synoptic report below  Note: There is extensive fibrosis and inflammation surrounding the tumor. IHC workup reveals the malignant cells are positive for TTF-1.  They are negative for Napsin and p40.  Per Dr. MAURA Garland, the tumor was adhered to the mediastinal pleura.  Fragments of fibrous adhesions containing infiltrating adenocarcinoma are present in the specimen container (grossly described as free-floating tissue).  Because adenocarcinoma is present in the adhesions, this lesion will be staged as invading the mediastinal region of the parietal  pleura (pT3).  This case is discussed with Dr. Garland on 2024.  A portion the specimen will be sent for PD-L1 IHC and molecular testing.  2.  Lung, left upper lobe, additional margin excision: -   Negative for carcinoma -   Emphysematous change and subpleural bleb  3.  Lymph node, left level 9, excision: -   1 lymph node negative for metastatic carcinoma, 0/1  4.  Lymph node, level 7, excision: -   1 lymph node negative for metastatic carcinoma, 0/1  5.  Lymph node, left level 10, excision: -   3 lymph nodes negative for metastatic carcinoma, 0/3  6.  Lymph node, left level 5, excision: -   Multiple fragments of lymph node(s) negative for metastatic carcinoma  Verified by: Rickey Cordero M.D. (Electronic Signature) Reported on: 24 16:08 [facility], [facility], [address] [place] [address] Phone: [phone]   Fax: [phone] _________________________________________________________________  Comment mol 1A   Synoptic Summary 1: Lung - Resection Specimen Procedure:  Wedge resection Specimen Laterality:   Left Tumor Tumor Focality:   Single focus Tumor Site:  Upper lobe of lung Tumor Size Total Tumor Size:   1.2 Centimeters (cm) Size of Invasive Component:   1.2 Centimeters (cm) Histologic Type:   Invasive solid adenocarcinoma Histologic Patterns Present:   Acinar - 10%;  Solid - 90% Histologic Grade:   G3, poorly differentiated Spread Through Air Spaces (REGLA):    Not identified Visceral Pleura Invasion:   Present Direct Invasion of Adjacent Structures:    Present Involved Adjacent Structures:   Mediastinal region of parietal pleura Treatment Effect:   No known presurgical therapy Lymphovascular Invasion:   Not identified Margins Margin Status for Invasive Carcinoma:    All margins negative for invasive carcinoma Closest Margin(s) to Invasive Carcinoma:    Parenchymal Distance from Invasive Carcinoma to Closest Margin:    Greater than 0.8 cm Margin Status for Non-Invasive Tumor:    Not applicable Regional Lymph Nodes Lymph Node(s) from Prior Procedures:    No known prior lymph node sampling performed Regional Lymph Node Status:   All regional lymph nodes negative for tumor Number of Lymph Nodes Examined:   At least 6 Amira Site(s) Examined:   7: Subcarinal;  5: Subaortic / aortopulmonary (AP) / AP window;  9L: Pulmonary ligament; 10L: Hilar Pathologic Stage Classification (pTNM, AJCC 8th Edition) pT Category:   pT4 pN Category:   pN0 Best Tumor Blocks for Future Studies Tumor Block(s):   1A  2025: TTB - - Surgical pathology was reviewed, pleural margin is positive.  [de-identified] : Adenocarcinoma - PDL1 40-50%, KRAS G12C [de-identified] : Positive margins, invasion of parietal pleura [de-identified] : Pulm:  CTSx:  [de-identified] : No complaints today. Since last visit she underwent a wedge resection and mediastinal lymph node dissection with Dr. Garland on 12/10. Final pathology resulted as T3 lesion, not due to size, size was only 1.2 cm, but it was invading the mediastinum. Her case was reviewed at thoracic tumor board today because there was some question on pathology given that clinically the tumor was invading with likely positive margins.  Consensus at tumor board and extensive pathologic review, resulted with positive margins.

## 2025-01-16 NOTE — HISTORY OF PRESENT ILLNESS
[FreeTextEntry1] : Ms. Amador presents today for consideration for radiation therapy for lung cancer.  SHe has extensive history including COPD, asthma, glaucoma, caratacts, T2DM, RA, given smoking history was under surveillance for a nodule that increased in size.   CT chest 5/30/24 showed: Left upper lobe 0.8 cm subsolid nodule contiguous with and adjacent to an emphysematous cystic air space medially, which demonstrates irregular nodular wall thickening as described above. The exact etiology is unclear, but one differential diagnostic consideration is for primary lung neoplasm. Recommend correlation with PET/CT. No evidence of pulmonary fibrosis as clinically questioned.  CT chest 8/23/2024 showed: When compared to examination dated 5/30/2024 there has been interval development of a part solid nodule measuring 29 x 28 mm in transverse by AP dimensions (9:67) within the apical posterior segment of the left upper lobe. The solid component has increased in size measuring 26 x 12 mm as above. Further imaging to include whole-body PET/CT is suggested to exclude developing adenocarcinoma. Substantial paraseptal and moderate centrilobular emphysema again noted. Multiple stable subcentimeter as well as solid and calcified micronodules as described above.  PET scan 9/25/2024 showed: 1. An intensely FDG avid irregular solid nodule in the paramediastinal left lung apex, compatible with lung malignancy. No hypermetabolic hilar or mediastinal nodes. 2. New superior endplate deformities in T12 and L2 with increased activity may reflect recent trauma; please correlate clinically. 3. No suspicious FDG avid malignant tissue in the remaining field of view.  FNAs done 10/24/24 showed: Final Diagnosis LUNG, LEFT, UPPER LOBE, FNA POSITIVE FOR MALIGNANT CELLS. Non- small cell carcinoma, favor adenocarcinoma  The cell block shows scant similar findings. PDL1 40-50%  11/11/2024 EBUS negative.   Brain MRI 11/18/2024 negative  12/10/2024 pathology showed: 1.  Lung, left upper lobe with mediastinal pleura, resection: -   Poorly differentiated adenocarcinoma, solid (90%) and acinar (10%) patterns -   1.2 cm greatest dimension -   Resection edge negative for adenocarcinoma -   Fibrous adhesions with infiltrating adenocarcinoma present, see note -   Negative for lymphovascular invasion -   Negative for REGLA -   Emphysematous changes of surrounding lung -   See synoptic report below Note: There is extensive fibrosis and inflammation surrounding the tumor. IHC workup reveals the malignant cells are positive for TTF-1.  They are negative for Napsin and p40.  Per Dr. MAURA Garland, the tumor was adhered to the mediastinal pleura.  Fragments of fibrous adhesions containing infiltrating adenocarcinoma are present in the specimen container (grossly described as free-floating tissue).  Because adenocarcinoma is present in the adhesions, this lesion will be staged as invading the mediastinal region of the parietal  pleura (pT3).  This case is discussed with Dr. Garland on 12/17/2024. A portion the specimen will be sent for PD-L1 IHC and molecular testing. 2.  Lung, left upper lobe, additional margin excision: -   Negative for carcinoma -   Emphysematous change and subpleural bleb 3.  Lymph node, left level 9, excision: -   1 lymph node negative for metastatic carcinoma, 0/1 4.  Lymph node, level 7, excision: -   1 lymph node negative for metastatic carcinoma, 0/1 5.  Lymph node, left level 10, excision: -   3 lymph nodes negative for metastatic carcinoma, 0/3 6.  Lymph node, left level 5, excision: -   Multiple fragments of lymph node(s) negative for metastatic carcinoma  PDL1>90% mutations: KRAS, TP53, FGFR1, EGFR, KEAP1  Today she notes breathing is stable. She continues to have some pain at the operative site, up to 10/10, managed with oxycodone PRN. She is overwhelmed with medical appointments, notes she will have glaucoma surgery this week.

## 2025-01-16 NOTE — ASSESSMENT
[FreeTextEntry1] : 60 yo F with smoking history, who quit 17 yrs ago here for follow up of KRAS G12C, PDL1 40-50%, NSCLC, stage IIB, s/p wedge plus MLND on 12/10/2024 with .   NSCLC - T4N0M0 All lymph node samples during resection were negative for carcinoma, however there was direct invasion of mediastinum and pleura, which makes the patient T4, not T3, and also pathologically margins were positive and not negative as initially stated, therefore treatment plan has changed after multidisciplinary tumor board discussion today. --Plan for concurrent chemoradiation therapy Given easier tolerance I recommend carboplatin/Taxol given weekly.  We discussed risk, benefits and side effect profile today.  The patient consents to treatment.  Educational materials provided.  Informed consent signed. --Refer to Dr. Dunn to plan for postoperative chemoradiation therapy given margins cannot be re-excised --Zofran as needed for nausea, no other premeds or postmeds needed Tentative plan to start CCRT on 2/4/2025

## 2025-01-16 NOTE — BEGINNING OF VISIT
[Diarrhea Character] : Diarrhea: Grade 0 [0] : 2) Feeling down, depressed, or hopeless: Not at all (0) [Pain Scale: ___] : On a scale of 1-10, today the patient's pain is a(n) [unfilled]. [Former] : Former [> 15 Years] : > 15 Years [[id number]] : 0 [Future Reassessment of Pain Scale] : Future reassessment of pain scale [Date Discussed (MM/DD/YY): ___] : Discussed: [unfilled] [Reviewed, no changes] : Reviewed, no changes [Abdominal Pain] : no abdominal pain [Vomiting] : no vomiting [Constipation] : no constipation

## 2025-01-16 NOTE — HISTORY OF PRESENT ILLNESS
[Disease: _____________________] : Disease: [unfilled] [T: ___] : T[unfilled] [N: ___] : N[unfilled] [M: ___] : M[unfilled] [AJCC Stage: ____] : AJCC Stage: [unfilled] [de-identified] : 61F former smoker (27py, quit ~17y ago) referred by Dr. Nieves after surveillance CT scans showing interval growth of CHAPARRITA lesion with biopsy showing lung adenocarcinoma, s/p EBUS 24 with results pending and MRI scheduled for 24, here for follow up.  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 Nieves for her COPD/atopic asthma. Given her smoking history, she also has been under surveillance for a lung nodule that has increased in size.    CT Chest 24 Left upper lobe 0.8 cm subsolid nodule contiguous with and adjacent to an emphysematous cystic air space medially, which demonstrates irregular nodular wall thickening as described above. The exact etiology is unclear, but one differential diagnostic consideration is for primary lung neoplasm. Recommend correlation with PET/CT. No evidence of pulmonary fibrosis as clinically questioned.  CT Chest 24 When compared to examination dated 2024 there has been interval development of a part solid nodule measuring 29 x 28 mm in transverse by AP dimensions (9:67) within the apical posterior segment of the left upper lobe. The solid component has increased in size measuring 26 x 12 mm as above. Further imaging to include whole-body PET/CT is suggested to exclude developing adenocarcinoma. Substantial paraseptal and moderate centrilobular emphysema again noted. Multiple stable subcentimeter as well as solid and calcified micronodules as described above.  PET-CT 24 Abnormal skull-to-thigh FDG-PET/CT scan. 1. An intensely FDG avid irregular solid nodule in the paramediastinal left lung apex, compatible with lung malignancy. No hypermetabolic hilar or mediastinal nodes. 2. New superior endplate deformities in T12 and L2 with increased activity may reflect recent trauma; please correlate clinically. 3. No suspicious FDG avid malignant tissue in the remaining field of view.  Lung, CHAPARRITA FNA Pathology 10/24/24 POSITIVE FOR MALIGNANT CELLS. Non- small cell carcinoma, favor adenocarcinoma - see Note. The cell block shows scant similar findings.  EBUS 24 ALL ln NEGATIVE  MRI Brain 24 - No evidence of intracranial metastasis.  12/10/2024: Pathology: Final Diagnosis  1.  Lung, left upper lobe with mediastinal pleura, resection: -   Poorly differentiated adenocarcinoma, solid (90%) and acinar (10%) patterns -   1.2 cm greatest dimension -   Resection edge negative for adenocarcinoma -   Fibrous adhesions with infiltrating adenocarcinoma present, see note -   Negative for lymphovascular invasion -   Negative for REGLA -   Emphysematous changes of surrounding lung -   See synoptic report below  Note: There is extensive fibrosis and inflammation surrounding the tumor. IHC workup reveals the malignant cells are positive for TTF-1.  They are negative for Napsin and p40.  Per Dr. MAURA Garland, the tumor was adhered to the mediastinal pleura.  Fragments of fibrous adhesions containing infiltrating adenocarcinoma are present in the specimen container (grossly described as free-floating tissue).  Because adenocarcinoma is present in the adhesions, this lesion will be staged as invading the mediastinal region of the parietal  pleura (pT3).  This case is discussed with Dr. Garland on 2024.  A portion the specimen will be sent for PD-L1 IHC and molecular testing.  2.  Lung, left upper lobe, additional margin excision: -   Negative for carcinoma -   Emphysematous change and subpleural bleb  3.  Lymph node, left level 9, excision: -   1 lymph node negative for metastatic carcinoma, 0/1  4.  Lymph node, level 7, excision: -   1 lymph node negative for metastatic carcinoma, 0/1  5.  Lymph node, left level 10, excision: -   3 lymph nodes negative for metastatic carcinoma, 0/3  6.  Lymph node, left level 5, excision: -   Multiple fragments of lymph node(s) negative for metastatic carcinoma  Verified by: Rickey Cordero M.D. (Electronic Signature) Reported on: 24 16:08 [facility], [facility], [address] [place] [address] Phone: [phone]   Fax: [phone] _________________________________________________________________  Comment mol 1A   Synoptic Summary 1: Lung - Resection Specimen Procedure:  Wedge resection Specimen Laterality:   Left Tumor Tumor Focality:   Single focus Tumor Site:  Upper lobe of lung Tumor Size Total Tumor Size:   1.2 Centimeters (cm) Size of Invasive Component:   1.2 Centimeters (cm) Histologic Type:   Invasive solid adenocarcinoma Histologic Patterns Present:   Acinar - 10%;  Solid - 90% Histologic Grade:   G3, poorly differentiated Spread Through Air Spaces (REGLA):    Not identified Visceral Pleura Invasion:   Present Direct Invasion of Adjacent Structures:    Present Involved Adjacent Structures:   Mediastinal region of parietal pleura Treatment Effect:   No known presurgical therapy Lymphovascular Invasion:   Not identified Margins Margin Status for Invasive Carcinoma:    All margins negative for invasive carcinoma Closest Margin(s) to Invasive Carcinoma:    Parenchymal Distance from Invasive Carcinoma to Closest Margin:    Greater than 0.8 cm Margin Status for Non-Invasive Tumor:    Not applicable Regional Lymph Nodes Lymph Node(s) from Prior Procedures:    No known prior lymph node sampling performed Regional Lymph Node Status:   All regional lymph nodes negative for tumor Number of Lymph Nodes Examined:   At least 6 Amira Site(s) Examined:   7: Subcarinal;  5: Subaortic / aortopulmonary (AP) / AP window;  9L: Pulmonary ligament; 10L: Hilar Pathologic Stage Classification (pTNM, AJCC 8th Edition) pT Category:   pT4 pN Category:   pN0 Best Tumor Blocks for Future Studies Tumor Block(s):   1A  2025: TTB - - Surgical pathology was reviewed, pleural margin is positive.  [de-identified] : Adenocarcinoma - PDL1 40-50%, KRAS G12C [de-identified] : Positive margins, invasion of parietal pleura [de-identified] : Pulm:  CTSx:  [de-identified] : No complaints today. Since last visit she underwent a wedge resection and mediastinal lymph node dissection with Dr. Garland on 12/10. Final pathology resulted as T3 lesion, not due to size, size was only 1.2 cm, but it was invading the mediastinum. Her case was reviewed at thoracic tumor board today because there was some question on pathology given that clinically the tumor was invading with likely positive margins.  Consensus at tumor board and extensive pathologic review, resulted with positive margins.

## 2025-01-16 NOTE — REVIEW OF SYSTEMS
[Cough] : cough [Joint Pain] : joint pain [Anxiety] : anxiety [Depression] : depression [Negative] : Endocrine

## 2025-01-16 NOTE — BEGINNING OF VISIT
[Diarrhea Character] : Diarrhea: Grade 0 [0] : 2) Feeling down, depressed, or hopeless: Not at all (0) [Pain Scale: ___] : On a scale of 1-10, today the patient's pain is a(n) [unfilled]. [Former] : Former [> 15 Years] : > 15 Years [[id number]] : 0 [Future Reassessment of Pain Scale] : Future reassessment of pain scale [Date Discussed (MM/DD/YY): ___] : Discussed: [unfilled] [Reviewed, no changes] : Reviewed, no changes [Abdominal Pain] : no abdominal pain [Vomiting] : no vomiting [Constipation] : no constipation

## 2025-01-22 NOTE — ASSESSMENT
[FreeTextEntry1] : 61F (accomapnied by her daughter: She) former smokers (smoked about one pack a day for 27 years, quit 17 years ago) with COPD and ICU admissions in the past (never intubated) here today to follow up eosinophilic COPD and lung nodule found to be adenoca now s/p CHAPARRITA wedge resection  COPD data: Inhaler: Symbicort 160-4.5, Incruse ellipta, albuterol Last exacerbation: 3/3/24  # of exacerbations since initiating Fasenra: 0 (previously 5) Bicarb 24 (3/24) in HIE PFTs 4/12/2024: mod obstruction/restriction and mod decrease DLCO, no BD response  6MWT 4/12/2024: 252 meters, no desat on RA, Sierra 7  ABG 4/19/24: no evidence of hypercapnia  IgE 3/26/24: 411 (ON Prednisone 60mg)  #COPD #Emphysema #chronic bronchitis #hypereosinophilia #Lung adenoca  Previously group E COPD. Had significant improvement on Fasenra and triple therapy (Incruse, Breo), and azithromycin. Has been off steroids for months without any recurrent exacerbation, excellent response to biologic. Now recovering from CHAPARRITA wedge resection (12/10/2024); still has left sided discomfort. Removed stitch at left chest tube site. Will send in percocet, and encouraged patient to try lidocaine patches at site.  No changes made to her COPD regimen. Improvement on distance on 6 minute walk test today, PFTs from 12/2024 with improved lung volumes. Encouraged patient to continue regular ambulation; states she will do so, especially now that stitch is removed. Glaucoma does not appear to be acute narrow angle glaucoma, can continue with LAMA.   Follow up in 3 months.

## 2025-01-22 NOTE — PROCEDURE
[FreeTextEntry1] : 6MWT 1/22/25 - 374 m, no hypoxia *** Pathology 10/24/24 Final Diagnosis LUNG, LEFT, UPPER LOBE, FNA  POSITIVE FOR MALIGNANT CELLS. Non- small cell carcinoma, favor adenocarcinoma - see Note. The cell block shows scant similar findings. *** PFT 12/2024 FVC 2.48 (96%) --> 2.49 (99%) FEV1 1.49 (74%) --> 1.64 (82%) FEV1/FVC 60% TLC 4.24 (80%) DLCO 58% *** EcG 8/1/24 Nonspecific ST and T wave abnormality When compared with ECG of 12-AUG-2022 15:53, No significant change was found  CT Chest 8/23/24: When compared to examination dated 5/30/2024 there has been interval development of a part solid nodule measuring 29 x 28 mm in transverse by AP dimensions (9:67) within the apical posterior segment of the left upper lobe. The solid component has increased in size measuring 26 x 12 mm as above. Further imaging to include whole-body PET/CT is suggested to exclude developing adenocarcinoma. Substantial paraseptal and moderate centrilobular emphysema again noted. Multiple stable subcentimeter as well as solid and calcified micronodules as described above. *** CT Chest 5/30/24 (personally reviewed images, formal report pending) - Severe upper lobe paraseptal emphysema, some centrilobular emphysema, no evidence of bronchiectasis or ABPA, CHAPARRITA 8mm part solid nodule with cystic lucencies suspicious for malignancy *** PFT 4/12/24 FVC 2.06 (78%) --> 2.06 (76%) FEV1 1.40 (68%) --> 1.37 (66%) FEV1/FVC 68% TLC 3.25 (66%) DLCO 10.06 (45%)  Mixed moderate obstructive and restrictive pattern with moderately reduced DLCO  6MWT 252 m, SpO2 96%-->97%,  --> 120, /83 --> 136/84, Sierra SOB 7, fatigue 4 ABG 7.41/46/29 (actually VBG) *** Labs 3/26/24 IgE 411 (ELEV) ProBNP 167 CBC - test cancelled due to clotting

## 2025-01-22 NOTE — HISTORY OF PRESENT ILLNESS
[Former] : former [>= 20 pack years] : >= 20 pack years [Rheumatologic] : rheumatologic [TextBox_4] : 61F (accomapnied by her daughter: She) former smoker (smoked about one pack a day for 27 years, quit 17 years ago) with COPD (atopic phenotype) and ICU admissions in the past (never intubated) referred to pulm to establish care. At initial visit was using symbicort and albuterol plus chest vest for airway clearance twice a day. Could only walk maximum 1-2 blocks before she gets short of breath and was chronically on 60mg prednisone daily for many years, and bactrim qd. She is commonly exposed to people who smoke cigarettes which also triggers her COPD. Has RA. Follows with Dr. Dotty Avila. Currently on hydroxychloraquine 100-200mg once a day.  PFTs obtained, demonstrated mod obstruction and restriction, mod reduced DLCO, no BDR. Decreased 6MWT to 252 meters without desaturation. No hypercapnia on ABG. Found to have elevated IgE (411) while ON prednisone 60mg. She was escalated to triple therapy (incruse/symbicort) + azithromycin and instructed to attempt very slow prednisone wean. but had difficulty and thus started on fasenra.  Also sent for CT chest given symptoms and high risk malignancy, found to have CHAPARRITA nodule now confirmed to be adenoca via EUS bx of apical CHAPARRITA nodule, now s/p CHAPARRITA wedge resection, path with some suggestion of mediastinal invasion, T3N0.  COPD: Inhaler: Symbicort 160-4.5, albuterol Last exacerbation: 3/3/24  # of exacerbations since initiating Fasenra: 0 Bicarb 24 (3/24) in HIE  1-22-25 1-22-25 CMP collected after last visit without liver or kidney injury; had a traumatic experience in ER due to issues obtaining labs.  Continues to have left sided discomfort at site of chest tube and left posterior chest- had been taking oxycodone sparingly with some improvement.  States she is not walking much, shortness of breath unchanged from prior to surgery. No wheezing. cough, sputum production or recent URI; endorses compliance with other medications Planned to start concurrent chemoradiation in 2/2025 Planned for eye procedure for elevated eye pressures (glaucoma); possibly also in 2/2025. 6 minute walk test today with 374m walked (improved from prior) Has lost 10lb since surgery in 12/2025; appetite is not great.  [YearQuit] : 2006

## 2025-01-22 NOTE — REVIEW OF SYSTEMS
[Fatigue] : fatigue [Chest Tightness] : chest tightness [SOB on Exertion] : sob on exertion [Immunocompromised] : immunocompromised [Arthralgias] : arthralgias [Chronic Pain] : chronic pain [Negative] : Psychiatric [Chest Discomfort] : chest discomfort [Cough] : no cough [Dyspnea] : no dyspnea [Pleuritic Pain] : no pleuritic pain [Wheezing] : no wheezing [TextBox_57] : chronic steroids

## 2025-02-04 NOTE — HISTORY OF PRESENT ILLNESS
[Disease: _____________________] : Disease: [unfilled] [T: ___] : T[unfilled] [N: ___] : N[unfilled] [M: ___] : M[unfilled] [AJCC Stage: ____] : AJCC Stage: [unfilled] [de-identified] : 61F former smoker (27py, quit ~17y ago) referred by Dr. Nieves after surveillance CT scans showing interval growth of CHAPARRITA lesion with biopsy showing lung adenocarcinoma status post resection by Dr. Garland, final pathology with positive margins and mediastinal invasion, here for follow up.  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 Nieves for her COPD/atopic asthma. Given her smoking history, she also has been under surveillance for a lung nodule that has increased in size.    CT Chest 24 Left upper lobe 0.8 cm subsolid nodule contiguous with and adjacent to an emphysematous cystic air space medially, which demonstrates irregular nodular wall thickening as described above. The exact etiology is unclear, but one differential diagnostic consideration is for primary lung neoplasm. Recommend correlation with PET/CT. No evidence of pulmonary fibrosis as clinically questioned.  CT Chest 24 When compared to examination dated 2024 there has been interval development of a part solid nodule measuring 29 x 28 mm in transverse by AP dimensions (9:67) within the apical posterior segment of the left upper lobe. The solid component has increased in size measuring 26 x 12 mm as above. Further imaging to include whole-body PET/CT is suggested to exclude developing adenocarcinoma. Substantial paraseptal and moderate centrilobular emphysema again noted. Multiple stable subcentimeter as well as solid and calcified micronodules as described above.  PET-CT 24 Abnormal skull-to-thigh FDG-PET/CT scan. 1. An intensely FDG avid irregular solid nodule in the paramediastinal left lung apex, compatible with lung malignancy. No hypermetabolic hilar or mediastinal nodes. 2. New superior endplate deformities in T12 and L2 with increased activity may reflect recent trauma; please correlate clinically. 3. No suspicious FDG avid malignant tissue in the remaining field of view.  Lung, CHAPARRITA FNA Pathology 10/24/24 POSITIVE FOR MALIGNANT CELLS. Non- small cell carcinoma, favor adenocarcinoma - see Note. The cell block shows scant similar findings.  EBUS 24 ALL ln NEGATIVE  MRI Brain 24 - No evidence of intracranial metastasis.  12/10/2024: Pathology: Final Diagnosis  1.  Lung, left upper lobe with mediastinal pleura, resection: -   Poorly differentiated adenocarcinoma, solid (90%) and acinar (10%) patterns -   1.2 cm greatest dimension -   Resection edge negative for adenocarcinoma -   Fibrous adhesions with infiltrating adenocarcinoma present, see note -   Negative for lymphovascular invasion -   Negative for REGLA -   Emphysematous changes of surrounding lung -   See synoptic report below  Note: There is extensive fibrosis and inflammation surrounding the tumor. IHC workup reveals the malignant cells are positive for TTF-1.  They are negative for Napsin and p40.  Per Dr. MAURA Garland, the tumor was adhered to the mediastinal pleura.  Fragments of fibrous adhesions containing infiltrating adenocarcinoma are present in the specimen container (grossly described as free-floating tissue).  Because adenocarcinoma is present in the adhesions, this lesion will be staged as invading the mediastinal region of the parietal  pleura (pT3).  This case is discussed with Dr. Garland on 2024.  A portion the specimen will be sent for PD-L1 IHC and molecular testing.  2.  Lung, left upper lobe, additional margin excision: -   Negative for carcinoma -   Emphysematous change and subpleural bleb  3.  Lymph node, left level 9, excision: -   1 lymph node negative for metastatic carcinoma, 0/1  4.  Lymph node, level 7, excision: -   1 lymph node negative for metastatic carcinoma, 0/1  5.  Lymph node, left level 10, excision: -   3 lymph nodes negative for metastatic carcinoma, 0/3  6.  Lymph node, left level 5, excision: -   Multiple fragments of lymph node(s) negative for metastatic carcinoma  Verified by: Rickey Cordero M.D. (Electronic Signature) Reported on: 24 16:08 [facility], [facility], [address] [place], [place] [address] Phone: [phone]   Fax: [phone] _________________________________________________________________  Comment mol 1A   Synoptic Summary 1: Lung - Resection Specimen Procedure:  Wedge resection Specimen Laterality:   Left Tumor Tumor Focality:   Single focus Tumor Site:  Upper lobe of lung Tumor Size Total Tumor Size:   1.2 Centimeters (cm) Size of Invasive Component:   1.2 Centimeters (cm) Histologic Type:   Invasive solid adenocarcinoma Histologic Patterns Present:   Acinar - 10%;  Solid - 90% Histologic Grade:   G3, poorly differentiated Spread Through Air Spaces (REGLA):    Not identified Visceral Pleura Invasion:   Present Direct Invasion of Adjacent Structures:    Present Involved Adjacent Structures:   Mediastinal region of parietal pleura Treatment Effect:   No known presurgical therapy Lymphovascular Invasion:   Not identified Margins Margin Status for Invasive Carcinoma:    All margins negative for invasive carcinoma Closest Margin(s) to Invasive Carcinoma:    Parenchymal Distance from Invasive Carcinoma to Closest Margin:    Greater than 0.8 cm Margin Status for Non-Invasive Tumor:    Not applicable Regional Lymph Nodes Lymph Node(s) from Prior Procedures:    No known prior lymph node sampling performed Regional Lymph Node Status:   All regional lymph nodes negative for tumor Number of Lymph Nodes Examined:   At least 6 Amira Site(s) Examined:   7: Subcarinal;  5: Subaortic / aortopulmonary (AP) / AP window;  9L: Pulmonary ligament; 10L: Hilar Pathologic Stage Classification (pTNM, AJCC 8th Edition) pT Category:   pT4 pN Category:   pN0 Best Tumor Blocks for Future Studies Tumor Block(s):   1A  2025: TTB - - Surgical pathology was reviewed, pleural margin is positive.  25: CT Chest:  1. Enlarging left superior mediastinal lesion with probable central necrosis measuring 25 x 25 mm (it measured 16 x 15 mm on whole-body PET/CT dated 2024. This may represent mediastinal tumor progression with central necrosis versus postsurgical changes/abscess. Clinical and laboratory correlation and follow-up whole-body PET/CT may be of value. 2. Interval postsurgical changes with chain sutures in the apical posterior segment of the left upper lobe in the paramediastinal region with increasing masslike consolidation in this region measuring 4 x 19 mm. Abbreviated differential includes tumor progression, postsurgical/radiation changes. Whole-body PET/CT correlation and short interval surveillance is suggested. 3. Additional stable findings as described above. [de-identified] : Adenocarcinoma - PDL1 40-50%, KRAS G12C [de-identified] : Positive margins, invasion of parietal pleura [de-identified] : Pulm:  CTSx:  [de-identified] : No complaints today. Since last visit she underwent a wedge resection and mediastinal lymph node dissection with Dr. Garland on 12/10/25. Final pathology resulted as T3 lesion, not due to size, size was only 1.2 cm, but it was invading the mediastinum. Her case was reviewed at thoracic tumor board today because there was some question on pathology given that clinically the tumor was invading with likely positive margins.  Consensus at tumor board and extensive pathologic review, resulted with positive margins. She is here to start her planned chemotherapy concurrent with radiation therapy.  There has been a delay in setting up radiation therapy, and this is now planned to start next week.  CT chest ordered and performed in January by Dr. Wernicke.

## 2025-02-04 NOTE — HISTORY OF PRESENT ILLNESS
[Disease: _____________________] : Disease: [unfilled] [T: ___] : T[unfilled] [N: ___] : N[unfilled] [M: ___] : M[unfilled] [AJCC Stage: ____] : AJCC Stage: [unfilled] [de-identified] : 61F former smoker (27py, quit ~17y ago) referred by Dr. Nieves after surveillance CT scans showing interval growth of CHAPARRITA lesion with biopsy showing lung adenocarcinoma status post resection by Dr. Garland, final pathology with positive margins and mediastinal invasion, here for follow up.  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 Nieves for her COPD/atopic asthma. Given her smoking history, she also has been under surveillance for a lung nodule that has increased in size.    CT Chest 24 Left upper lobe 0.8 cm subsolid nodule contiguous with and adjacent to an emphysematous cystic air space medially, which demonstrates irregular nodular wall thickening as described above. The exact etiology is unclear, but one differential diagnostic consideration is for primary lung neoplasm. Recommend correlation with PET/CT. No evidence of pulmonary fibrosis as clinically questioned.  CT Chest 24 When compared to examination dated 2024 there has been interval development of a part solid nodule measuring 29 x 28 mm in transverse by AP dimensions (9:67) within the apical posterior segment of the left upper lobe. The solid component has increased in size measuring 26 x 12 mm as above. Further imaging to include whole-body PET/CT is suggested to exclude developing adenocarcinoma. Substantial paraseptal and moderate centrilobular emphysema again noted. Multiple stable subcentimeter as well as solid and calcified micronodules as described above.  PET-CT 24 Abnormal skull-to-thigh FDG-PET/CT scan. 1. An intensely FDG avid irregular solid nodule in the paramediastinal left lung apex, compatible with lung malignancy. No hypermetabolic hilar or mediastinal nodes. 2. New superior endplate deformities in T12 and L2 with increased activity may reflect recent trauma; please correlate clinically. 3. No suspicious FDG avid malignant tissue in the remaining field of view.  Lung, CHAPARRITA FNA Pathology 10/24/24 POSITIVE FOR MALIGNANT CELLS. Non- small cell carcinoma, favor adenocarcinoma - see Note. The cell block shows scant similar findings.  EBUS 24 ALL ln NEGATIVE  MRI Brain 24 - No evidence of intracranial metastasis.  12/10/2024: Pathology: Final Diagnosis  1.  Lung, left upper lobe with mediastinal pleura, resection: -   Poorly differentiated adenocarcinoma, solid (90%) and acinar (10%) patterns -   1.2 cm greatest dimension -   Resection edge negative for adenocarcinoma -   Fibrous adhesions with infiltrating adenocarcinoma present, see note -   Negative for lymphovascular invasion -   Negative for REGLA -   Emphysematous changes of surrounding lung -   See synoptic report below  Note: There is extensive fibrosis and inflammation surrounding the tumor. IHC workup reveals the malignant cells are positive for TTF-1.  They are negative for Napsin and p40.  Per Dr. MAURA Garland, the tumor was adhered to the mediastinal pleura.  Fragments of fibrous adhesions containing infiltrating adenocarcinoma are present in the specimen container (grossly described as free-floating tissue).  Because adenocarcinoma is present in the adhesions, this lesion will be staged as invading the mediastinal region of the parietal  pleura (pT3).  This case is discussed with Dr. Garland on 2024.  A portion the specimen will be sent for PD-L1 IHC and molecular testing.  2.  Lung, left upper lobe, additional margin excision: -   Negative for carcinoma -   Emphysematous change and subpleural bleb  3.  Lymph node, left level 9, excision: -   1 lymph node negative for metastatic carcinoma, 0/1  4.  Lymph node, level 7, excision: -   1 lymph node negative for metastatic carcinoma, 0/1  5.  Lymph node, left level 10, excision: -   3 lymph nodes negative for metastatic carcinoma, 0/3  6.  Lymph node, left level 5, excision: -   Multiple fragments of lymph node(s) negative for metastatic carcinoma  Verified by: Rickey Cordero M.D. (Electronic Signature) Reported on: 24 16:08 [facility], [facility], [address] [place], [place] [address] Phone: [phone]   Fax: [phone] _________________________________________________________________  Comment mol 1A   Synoptic Summary 1: Lung - Resection Specimen Procedure:  Wedge resection Specimen Laterality:   Left Tumor Tumor Focality:   Single focus Tumor Site:  Upper lobe of lung Tumor Size Total Tumor Size:   1.2 Centimeters (cm) Size of Invasive Component:   1.2 Centimeters (cm) Histologic Type:   Invasive solid adenocarcinoma Histologic Patterns Present:   Acinar - 10%;  Solid - 90% Histologic Grade:   G3, poorly differentiated Spread Through Air Spaces (REGLA):    Not identified Visceral Pleura Invasion:   Present Direct Invasion of Adjacent Structures:    Present Involved Adjacent Structures:   Mediastinal region of parietal pleura Treatment Effect:   No known presurgical therapy Lymphovascular Invasion:   Not identified Margins Margin Status for Invasive Carcinoma:    All margins negative for invasive carcinoma Closest Margin(s) to Invasive Carcinoma:    Parenchymal Distance from Invasive Carcinoma to Closest Margin:    Greater than 0.8 cm Margin Status for Non-Invasive Tumor:    Not applicable Regional Lymph Nodes Lymph Node(s) from Prior Procedures:    No known prior lymph node sampling performed Regional Lymph Node Status:   All regional lymph nodes negative for tumor Number of Lymph Nodes Examined:   At least 6 Amira Site(s) Examined:   7: Subcarinal;  5: Subaortic / aortopulmonary (AP) / AP window;  9L: Pulmonary ligament; 10L: Hilar Pathologic Stage Classification (pTNM, AJCC 8th Edition) pT Category:   pT4 pN Category:   pN0 Best Tumor Blocks for Future Studies Tumor Block(s):   1A  2025: TTB - - Surgical pathology was reviewed, pleural margin is positive.  25: CT Chest:  1. Enlarging left superior mediastinal lesion with probable central necrosis measuring 25 x 25 mm (it measured 16 x 15 mm on whole-body PET/CT dated 2024. This may represent mediastinal tumor progression with central necrosis versus postsurgical changes/abscess. Clinical and laboratory correlation and follow-up whole-body PET/CT may be of value. 2. Interval postsurgical changes with chain sutures in the apical posterior segment of the left upper lobe in the paramediastinal region with increasing masslike consolidation in this region measuring 4 x 19 mm. Abbreviated differential includes tumor progression, postsurgical/radiation changes. Whole-body PET/CT correlation and short interval surveillance is suggested. 3. Additional stable findings as described above. [de-identified] : Adenocarcinoma - PDL1 40-50%, KRAS G12C [de-identified] : Positive margins, invasion of parietal pleura [de-identified] : Pulm:  CTSx:  [de-identified] : No complaints today. Since last visit she underwent a wedge resection and mediastinal lymph node dissection with Dr. Garland on 12/10/25. Final pathology resulted as T3 lesion, not due to size, size was only 1.2 cm, but it was invading the mediastinum. Her case was reviewed at thoracic tumor board today because there was some question on pathology given that clinically the tumor was invading with likely positive margins.  Consensus at tumor board and extensive pathologic review, resulted with positive margins. She is here to start her planned chemotherapy concurrent with radiation therapy.  There has been a delay in setting up radiation therapy, and this is now planned to start next week.  CT chest ordered and performed in January by Dr. Wernicke.

## 2025-02-04 NOTE — ASSESSMENT
[FreeTextEntry1] : 60 yo F with smoking history, who quit 17 yrs ago here for follow up of KRAS G12C, PDL1 40-50%, NSCLC, stage IIIA, s/p wedge plus MLND on 12/10/2024 with .   NSCLC - T4N0M0 All lymph node samples during resection were negative for carcinoma, however there was direct invasion of mediastinum and pleura, which makes the patient T4, not T3, and also pathologically margins were positive and not negative as initially stated, therefore treatment plan has changed after multidisciplinary tumor board discussion for postop concurrent chemoradiation therapy. --Zofran as needed for nausea, no other premeds or postmeds needed -- I reviewed independently the images of her CT chest and also discussed the images with Dr. Nieves as well as radiology.  This could be consistent with for postoperative change, regrowing tumor versus even possibly abscess?,  However given she has no symptoms of an infection and her CBC is completely within normal range the chance of abscess is very low/minimal per my discussion with Dr. Nieves --Planned to start RT next week --F/u next week --Labs obtained CBC, CMP --The patient is on therapy with carboplatin/Taxol requiring intensive monitoring for toxicity with CBC, CMP

## 2025-02-05 NOTE — ASSESSMENT
[FreeTextEntry1] : 62 yo F with smoking history, who quit 17 yrs ago here for follow up of KRAS G12C, PDL1 40-50%, NSCLC, stage IIIA, s/p wedge plus MLND on 12/10/2024 with .   NSCLC - T4N0M0 All lymph node samples during resection were negative for carcinoma, however there was direct invasion of mediastinum and pleura, which makes the patient T4, not T3, and also pathologically margins were positive and not negative as initially stated, therefore treatment plan has changed after multidisciplinary tumor board discussion for postop concurrent chemoradiation therapy. --Zofran as needed for nausea, no other premeds or postmeds needed -- I reviewed independently the images of her CT chest and also discussed the images with Dr. Nieves as well as radiology.  This could be consistent with for postoperative change, regrowing tumor versus even possibly abscess?,  However given she has no symptoms of an infection and her CBC is completely within normal range the chance of abscess is very low/minimal per my discussion with Dr. Nieves --Planned to start RT next week --F/u next week --Labs obtained CBC, CMP --The patient is on therapy with carboplatin/Taxol requiring intensive monitoring for toxicity with CBC, CMP

## 2025-02-05 NOTE — HISTORY OF PRESENT ILLNESS
[Disease: _____________________] : Disease: [unfilled] [T: ___] : T[unfilled] [N: ___] : N[unfilled] [M: ___] : M[unfilled] [AJCC Stage: ____] : AJCC Stage: [unfilled] [de-identified] : 61F former smoker (27py, quit ~17y ago) referred by Dr. Nieves after surveillance CT scans showing interval growth of CHAPARRITA lesion with biopsy showing lung adenocarcinoma status post resection by Dr. Garland, final pathology with positive margins and mediastinal invasion, here for follow up.  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 Nieves for her COPD/atopic asthma. Given her smoking history, she also has been under surveillance for a lung nodule that has increased in size.    CT Chest 24 Left upper lobe 0.8 cm subsolid nodule contiguous with and adjacent to an emphysematous cystic air space medially, which demonstrates irregular nodular wall thickening as described above. The exact etiology is unclear, but one differential diagnostic consideration is for primary lung neoplasm. Recommend correlation with PET/CT. No evidence of pulmonary fibrosis as clinically questioned.  CT Chest 24 When compared to examination dated 2024 there has been interval development of a part solid nodule measuring 29 x 28 mm in transverse by AP dimensions (9:67) within the apical posterior segment of the left upper lobe. The solid component has increased in size measuring 26 x 12 mm as above. Further imaging to include whole-body PET/CT is suggested to exclude developing adenocarcinoma. Substantial paraseptal and moderate centrilobular emphysema again noted. Multiple stable subcentimeter as well as solid and calcified micronodules as described above.  PET-CT 24 Abnormal skull-to-thigh FDG-PET/CT scan. 1. An intensely FDG avid irregular solid nodule in the paramediastinal left lung apex, compatible with lung malignancy. No hypermetabolic hilar or mediastinal nodes. 2. New superior endplate deformities in T12 and L2 with increased activity may reflect recent trauma; please correlate clinically. 3. No suspicious FDG avid malignant tissue in the remaining field of view.  Lung, CHAPARRITA FNA Pathology 10/24/24 POSITIVE FOR MALIGNANT CELLS. Non- small cell carcinoma, favor adenocarcinoma - see Note. The cell block shows scant similar findings.  EBUS 24 ALL ln NEGATIVE  MRI Brain 24 - No evidence of intracranial metastasis.  12/10/2024: Pathology: Final Diagnosis  1.  Lung, left upper lobe with mediastinal pleura, resection: -   Poorly differentiated adenocarcinoma, solid (90%) and acinar (10%) patterns -   1.2 cm greatest dimension -   Resection edge negative for adenocarcinoma -   Fibrous adhesions with infiltrating adenocarcinoma present, see note -   Negative for lymphovascular invasion -   Negative for REGLA -   Emphysematous changes of surrounding lung -   See synoptic report below  Note: There is extensive fibrosis and inflammation surrounding the tumor. IHC workup reveals the malignant cells are positive for TTF-1.  They are negative for Napsin and p40.  Per Dr. MAURA Garland, the tumor was adhered to the mediastinal pleura.  Fragments of fibrous adhesions containing infiltrating adenocarcinoma are present in the specimen container (grossly described as free-floating tissue).  Because adenocarcinoma is present in the adhesions, this lesion will be staged as invading the mediastinal region of the parietal  pleura (pT3).  This case is discussed with Dr. Garland on 2024.  A portion the specimen will be sent for PD-L1 IHC and molecular testing.  2.  Lung, left upper lobe, additional margin excision: -   Negative for carcinoma -   Emphysematous change and subpleural bleb  3.  Lymph node, left level 9, excision: -   1 lymph node negative for metastatic carcinoma, 0/1  4.  Lymph node, level 7, excision: -   1 lymph node negative for metastatic carcinoma, 0/1  5.  Lymph node, left level 10, excision: -   3 lymph nodes negative for metastatic carcinoma, 0/3  6.  Lymph node, left level 5, excision: -   Multiple fragments of lymph node(s) negative for metastatic carcinoma  Verified by: Rickey Cordero M.D. (Electronic Signature) Reported on: 24 16:08 [facility], [facility], [address] [place], [place] [address] Phone: [phone]   Fax: [phone] _________________________________________________________________  Comment mol 1A   Synoptic Summary 1: Lung - Resection Specimen Procedure:  Wedge resection Specimen Laterality:   Left Tumor Tumor Focality:   Single focus Tumor Site:  Upper lobe of lung Tumor Size Total Tumor Size:   1.2 Centimeters (cm) Size of Invasive Component:   1.2 Centimeters (cm) Histologic Type:   Invasive solid adenocarcinoma Histologic Patterns Present:   Acinar - 10%;  Solid - 90% Histologic Grade:   G3, poorly differentiated Spread Through Air Spaces (REGLA):    Not identified Visceral Pleura Invasion:   Present Direct Invasion of Adjacent Structures:    Present Involved Adjacent Structures:   Mediastinal region of parietal pleura Treatment Effect:   No known presurgical therapy Lymphovascular Invasion:   Not identified Margins Margin Status for Invasive Carcinoma:    All margins negative for invasive carcinoma Closest Margin(s) to Invasive Carcinoma:    Parenchymal Distance from Invasive Carcinoma to Closest Margin:    Greater than 0.8 cm Margin Status for Non-Invasive Tumor:    Not applicable Regional Lymph Nodes Lymph Node(s) from Prior Procedures:    No known prior lymph node sampling performed Regional Lymph Node Status:   All regional lymph nodes negative for tumor Number of Lymph Nodes Examined:   At least 6 Amira Site(s) Examined:   7: Subcarinal;  5: Subaortic / aortopulmonary (AP) / AP window;  9L: Pulmonary ligament; 10L: Hilar Pathologic Stage Classification (pTNM, AJCC 8th Edition) pT Category:   pT4 pN Category:   pN0 Best Tumor Blocks for Future Studies Tumor Block(s):   1A  2025: TTB - - Surgical pathology was reviewed, pleural margin is positive.  25: CT Chest:  1. Enlarging left superior mediastinal lesion with probable central necrosis measuring 25 x 25 mm (it measured 16 x 15 mm on whole-body PET/CT dated 2024. This may represent mediastinal tumor progression with central necrosis versus postsurgical changes/abscess. Clinical and laboratory correlation and follow-up whole-body PET/CT may be of value. 2. Interval postsurgical changes with chain sutures in the apical posterior segment of the left upper lobe in the paramediastinal region with increasing masslike consolidation in this region measuring 4 x 19 mm. Abbreviated differential includes tumor progression, postsurgical/radiation changes. Whole-body PET/CT correlation and short interval surveillance is suggested. 3. Additional stable findings as described above. [de-identified] : Adenocarcinoma - PDL1 40-50%, KRAS G12C [de-identified] : Positive margins, invasion of parietal pleura [de-identified] : Pulm:  CTSx:  [de-identified] : No complaints today. Since last visit she underwent a wedge resection and mediastinal lymph node dissection with Dr. Garland on 12/10/25. Final pathology resulted as T3 lesion, not due to size, size was only 1.2 cm, but it was invading the mediastinum. Her case was reviewed at thoracic tumor board today because there was some question on pathology given that clinically the tumor was invading with likely positive margins.  Consensus at tumor board and extensive pathologic review, resulted with positive margins. She is here to start her planned chemotherapy concurrent with radiation therapy.  There has been a delay in setting up radiation therapy, and this is now planned to start next week.  CT chest ordered and performed in January by Dr. Wernicke.

## 2025-02-05 NOTE — HISTORY OF PRESENT ILLNESS
[Disease: _____________________] : Disease: [unfilled] [T: ___] : T[unfilled] [N: ___] : N[unfilled] [M: ___] : M[unfilled] [AJCC Stage: ____] : AJCC Stage: [unfilled] [de-identified] : 61F former smoker (27py, quit ~17y ago) referred by Dr. Nieves after surveillance CT scans showing interval growth of CHAPARRITA lesion with biopsy showing lung adenocarcinoma status post resection by Dr. Garland, final pathology with positive margins and mediastinal invasion, here for follow up.  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 Nieves for her COPD/atopic asthma. Given her smoking history, she also has been under surveillance for a lung nodule that has increased in size.    CT Chest 24 Left upper lobe 0.8 cm subsolid nodule contiguous with and adjacent to an emphysematous cystic air space medially, which demonstrates irregular nodular wall thickening as described above. The exact etiology is unclear, but one differential diagnostic consideration is for primary lung neoplasm. Recommend correlation with PET/CT. No evidence of pulmonary fibrosis as clinically questioned.  CT Chest 24 When compared to examination dated 2024 there has been interval development of a part solid nodule measuring 29 x 28 mm in transverse by AP dimensions (9:67) within the apical posterior segment of the left upper lobe. The solid component has increased in size measuring 26 x 12 mm as above. Further imaging to include whole-body PET/CT is suggested to exclude developing adenocarcinoma. Substantial paraseptal and moderate centrilobular emphysema again noted. Multiple stable subcentimeter as well as solid and calcified micronodules as described above.  PET-CT 24 Abnormal skull-to-thigh FDG-PET/CT scan. 1. An intensely FDG avid irregular solid nodule in the paramediastinal left lung apex, compatible with lung malignancy. No hypermetabolic hilar or mediastinal nodes. 2. New superior endplate deformities in T12 and L2 with increased activity may reflect recent trauma; please correlate clinically. 3. No suspicious FDG avid malignant tissue in the remaining field of view.  Lung, CHAPARRITA FNA Pathology 10/24/24 POSITIVE FOR MALIGNANT CELLS. Non- small cell carcinoma, favor adenocarcinoma - see Note. The cell block shows scant similar findings.  EBUS 24 ALL ln NEGATIVE  MRI Brain 24 - No evidence of intracranial metastasis.  12/10/2024: Pathology: Final Diagnosis  1.  Lung, left upper lobe with mediastinal pleura, resection: -   Poorly differentiated adenocarcinoma, solid (90%) and acinar (10%) patterns -   1.2 cm greatest dimension -   Resection edge negative for adenocarcinoma -   Fibrous adhesions with infiltrating adenocarcinoma present, see note -   Negative for lymphovascular invasion -   Negative for REGLA -   Emphysematous changes of surrounding lung -   See synoptic report below  Note: There is extensive fibrosis and inflammation surrounding the tumor. IHC workup reveals the malignant cells are positive for TTF-1.  They are negative for Napsin and p40.  Per Dr. MAURA Garland, the tumor was adhered to the mediastinal pleura.  Fragments of fibrous adhesions containing infiltrating adenocarcinoma are present in the specimen container (grossly described as free-floating tissue).  Because adenocarcinoma is present in the adhesions, this lesion will be staged as invading the mediastinal region of the parietal  pleura (pT3).  This case is discussed with Dr. Garland on 2024.  A portion the specimen will be sent for PD-L1 IHC and molecular testing.  2.  Lung, left upper lobe, additional margin excision: -   Negative for carcinoma -   Emphysematous change and subpleural bleb  3.  Lymph node, left level 9, excision: -   1 lymph node negative for metastatic carcinoma, 0/1  4.  Lymph node, level 7, excision: -   1 lymph node negative for metastatic carcinoma, 0/1  5.  Lymph node, left level 10, excision: -   3 lymph nodes negative for metastatic carcinoma, 0/3  6.  Lymph node, left level 5, excision: -   Multiple fragments of lymph node(s) negative for metastatic carcinoma  Verified by: Rickey Cordero M.D. (Electronic Signature) Reported on: 24 16:08 [facility], [facility], [address] [place], [place] [address] Phone: [phone]   Fax: [phone] _________________________________________________________________  Comment mol 1A   Synoptic Summary 1: Lung - Resection Specimen Procedure:  Wedge resection Specimen Laterality:   Left Tumor Tumor Focality:   Single focus Tumor Site:  Upper lobe of lung Tumor Size Total Tumor Size:   1.2 Centimeters (cm) Size of Invasive Component:   1.2 Centimeters (cm) Histologic Type:   Invasive solid adenocarcinoma Histologic Patterns Present:   Acinar - 10%;  Solid - 90% Histologic Grade:   G3, poorly differentiated Spread Through Air Spaces (REGLA):    Not identified Visceral Pleura Invasion:   Present Direct Invasion of Adjacent Structures:    Present Involved Adjacent Structures:   Mediastinal region of parietal pleura Treatment Effect:   No known presurgical therapy Lymphovascular Invasion:   Not identified Margins Margin Status for Invasive Carcinoma:    All margins negative for invasive carcinoma Closest Margin(s) to Invasive Carcinoma:    Parenchymal Distance from Invasive Carcinoma to Closest Margin:    Greater than 0.8 cm Margin Status for Non-Invasive Tumor:    Not applicable Regional Lymph Nodes Lymph Node(s) from Prior Procedures:    No known prior lymph node sampling performed Regional Lymph Node Status:   All regional lymph nodes negative for tumor Number of Lymph Nodes Examined:   At least 6 Amira Site(s) Examined:   7: Subcarinal;  5: Subaortic / aortopulmonary (AP) / AP window;  9L: Pulmonary ligament; 10L: Hilar Pathologic Stage Classification (pTNM, AJCC 8th Edition) pT Category:   pT4 pN Category:   pN0 Best Tumor Blocks for Future Studies Tumor Block(s):   1A  2025: TTB - - Surgical pathology was reviewed, pleural margin is positive.  25: CT Chest:  1. Enlarging left superior mediastinal lesion with probable central necrosis measuring 25 x 25 mm (it measured 16 x 15 mm on whole-body PET/CT dated 2024. This may represent mediastinal tumor progression with central necrosis versus postsurgical changes/abscess. Clinical and laboratory correlation and follow-up whole-body PET/CT may be of value. 2. Interval postsurgical changes with chain sutures in the apical posterior segment of the left upper lobe in the paramediastinal region with increasing masslike consolidation in this region measuring 4 x 19 mm. Abbreviated differential includes tumor progression, postsurgical/radiation changes. Whole-body PET/CT correlation and short interval surveillance is suggested. 3. Additional stable findings as described above. [de-identified] : Adenocarcinoma - PDL1 40-50%, KRAS G12C [de-identified] : Positive margins, invasion of parietal pleura [de-identified] : Pulm:  CTSx:  [de-identified] : No complaints today. Since last visit she underwent a wedge resection and mediastinal lymph node dissection with Dr. Garland on 12/10/25. Final pathology resulted as T3 lesion, not due to size, size was only 1.2 cm, but it was invading the mediastinum. Her case was reviewed at thoracic tumor board today because there was some question on pathology given that clinically the tumor was invading with likely positive margins.  Consensus at tumor board and extensive pathologic review, resulted with positive margins. She is here to start her planned chemotherapy concurrent with radiation therapy.  There has been a delay in setting up radiation therapy, and this is now planned to start next week.  CT chest ordered and performed in January by Dr. Wernicke.

## 2025-02-12 NOTE — HISTORY OF PRESENT ILLNESS
[FreeTextEntry1] : Ms. Pompey presents today for consideration for radiation therapy for lung cancer.  She has extensive history including COPD, asthma, glaucoma, cataracts, T2DM, RA, given smoking history was under surveillance for a nodule that increased in size.  1/16/25 NEW CONSULT- Today she notes breathing is stable. She continues to have some pain at the operative site, up to 10/10, managed with oxycodone PRN. She is overwhelmed with medical appointments, notes she will have glaucoma surgery this week.   OTV APPTS: 2/12/25 OTV- Pt has completed 2/30 fx and 400/6000 cGy to the left lung. She tolerated her treatments. She did not want her VS taken at this time since she was upset, she had a long wait time. She was able to communicate her frustrations nd got Jeannie Garcia in the room to speak to the pt. She denies any difficulty breathing. Chest wall intact no discoloration. She will continue her current RT treatments as planned. ____________________________________________________________________________________________________________________________________________ CT chest 5/30/24 showed: Left upper lobe 0.8 cm subsolid nodule contiguous with and adjacent to an emphysematous cystic air space medially, which demonstrates irregular nodular wall thickening as described above. The exact etiology is unclear, but one differential diagnostic consideration is for primary lung neoplasm. Recommend correlation with PET/CT. No evidence of pulmonary fibrosis as clinically questioned.  CT chest 8/23/2024 showed: When compared to examination dated 5/30/2024 there has been interval development of a part solid nodule measuring 29 x 28 mm in transverse by AP dimensions (9:67) within the apical posterior segment of the left upper lobe. The solid component has increased in size measuring 26 x 12 mm as above. Further imaging to include whole-body PET/CT is suggested to exclude developing adenocarcinoma. Substantial paraseptal and moderate centrilobular emphysema again noted. Multiple stable subcentimeter as well as solid and calcified micronodules as described above.  PET scan 9/25/2024 showed: 1. An intensely FDG avid irregular solid nodule in the paramediastinal left lung apex, compatible with lung malignancy. No hypermetabolic hilar or mediastinal nodes. 2. New superior endplate deformities in T12 and L2 with increased activity may reflect recent trauma; please correlate clinically. 3. No suspicious FDG avid malignant tissue in the remaining field of view.  FNAs done 10/24/24 showed: Final Diagnosis LUNG, LEFT, UPPER LOBE, FNA POSITIVE FOR MALIGNANT CELLS. Non- small cell carcinoma, favor adenocarcinoma The cell block shows scant similar findings. PDL1 40-50%  11/11/2024 EBUS negative.  Brain MRI 11/18/2024 negative  12/10/2024 pathology showed: 1. Lung, left upper lobe with mediastinal pleura, resection: - Poorly differentiated adenocarcinoma, solid (90%) and acinar (10%) patterns - 1.2 cm greatest dimension - Resection edge negative for adenocarcinoma - Fibrous adhesions with infiltrating adenocarcinoma present, see note - Negative for lymphovascular invasion - Negative for REGLA - Emphysematous changes of surrounding lung - See synoptic report below Note: There is extensive fibrosis and inflammation surrounding the tumor. IHC workup reveals the malignant cells are positive for TTF-1. They are negative for Napsin and p40. Per Dr. MAURA Garland, the tumor was adhered to the mediastinal pleura. Fragments of fibrous adhesions containing infiltrating adenocarcinoma are present in the specimen container (grossly described as free-floating tissue). Because adenocarcinoma is present in the adhesions, this lesion will be staged as invading the mediastinal region of the parietal pleura (pT3). This case is discussed with Dr. Garland on 12/17/2024. A portion the specimen will be sent for PD-L1 IHC and molecular testing. 2. Lung, left upper lobe, additional margin excision: - Negative for carcinoma - Emphysematous change and subpleural bleb 3. Lymph node, left level 9, excision: - 1 lymph node negative for metastatic carcinoma, 0/1 4. Lymph node, level 7, excision: - 1 lymph node negative for metastatic carcinoma, 0/1 5. Lymph node, left level 10, excision: - 3 lymph nodes negative for metastatic carcinoma, 0/3 6. Lymph node, left level 5, excision: - Multiple fragments of lymph node(s) negative for metastatic carcinoma  PDL1>90% mutations: KRAS, TP53, FGFR1, EGFR, KEAP1

## 2025-02-27 NOTE — HISTORY OF PRESENT ILLNESS
[FreeTextEntry1] : Ms. Pompey presents today for consideration for radiation therapy for lung cancer.  She has extensive history including COPD, asthma, glaucoma, cataracts, T2DM, RA, given smoking history was under surveillance for a nodule that increased in size.  1/16/25 NEW CONSULT- Today she notes breathing is stable. She continues to have some pain at the operative site, up to 10/10, managed with oxycodone PRN. She is overwhelmed with medical appointments, notes she will have glaucoma surgery this week.   OTV APPTS:  2/27/2025: OTV Ms. Pompey has completed 12/30Fx or 2400/6000cGy radiation to the Left lung. Denies any SOB or difficulty breathing. Lungs CTAB no wheezing noted. She is expressing she has difficulty swallowing so I ordered magic mouth rinse and reviewed with pt. Also experiencing pain and ordered Percocet prn. She has noticed on her right cheek a white head pimple that is tender. no draiange noted and slight tender to touch. She is afebrile. She stated that she had a biopsy by an [employer] MD that performed  a procedure and advised  to follow up with the doctor that performed the procedure and he verbalized she will. She will continue her RT treatments a planned.    2/12/25 OTV- Pt has completed 2/30 fx and 400/6000 cGy to the left lung. She tolerated her treatments. She did not want her VS taken at this time since she was upset, she had a long wait time. She was able to communicate her frustrations nd got Jeannie Garcia in the room to speak to the pt. She denies any difficulty breathing. Chest wall intact no discoloration. She will continue her current RT treatments as planned. ____________________________________________________________________________________________________________________________________________ CT chest 5/30/24 showed: Left upper lobe 0.8 cm subsolid nodule contiguous with and adjacent to an emphysematous cystic air space medially, which demonstrates irregular nodular wall thickening as described above. The exact etiology is unclear, but one differential diagnostic consideration is for primary lung neoplasm. Recommend correlation with PET/CT. No evidence of pulmonary fibrosis as clinically questioned.  CT chest 8/23/2024 showed: When compared to examination dated 5/30/2024 there has been interval development of a part solid nodule measuring 29 x 28 mm in transverse by AP dimensions (9:67) within the apical posterior segment of the left upper lobe. The solid component has increased in size measuring 26 x 12 mm as above. Further imaging to include whole-body PET/CT is suggested to exclude developing adenocarcinoma. Substantial paraseptal and moderate centrilobular emphysema again noted. Multiple stable subcentimeter as well as solid and calcified micronodules as described above.  PET scan 9/25/2024 showed: 1. An intensely FDG avid irregular solid nodule in the paramediastinal left lung apex, compatible with lung malignancy. No hypermetabolic hilar or mediastinal nodes. 2. New superior endplate deformities in T12 and L2 with increased activity may reflect recent trauma; please correlate clinically. 3. No suspicious FDG avid malignant tissue in the remaining field of view.  FNAs done 10/24/24 showed: Final Diagnosis LUNG, LEFT, UPPER LOBE, FNA POSITIVE FOR MALIGNANT CELLS. Non- small cell carcinoma, favor adenocarcinoma The cell block shows scant similar findings. PDL1 40-50%  11/11/2024 EBUS negative.  Brain MRI 11/18/2024 negative  12/10/2024 pathology showed: 1. Lung, left upper lobe with mediastinal pleura, resection: - Poorly differentiated adenocarcinoma, solid (90%) and acinar (10%) patterns - 1.2 cm greatest dimension - Resection edge negative for adenocarcinoma - Fibrous adhesions with infiltrating adenocarcinoma present, see note - Negative for lymphovascular invasion - Negative for REGLA - Emphysematous changes of surrounding lung - See synoptic report below Note: There is extensive fibrosis and inflammation surrounding the tumor. IHC workup reveals the malignant cells are positive for TTF-1. They are negative for Napsin and p40. Per Dr. MAURA Garland, the tumor was adhered to the mediastinal pleura. Fragments of fibrous adhesions containing infiltrating adenocarcinoma are present in the specimen container (grossly described as free-floating tissue). Because adenocarcinoma is present in the adhesions, this lesion will be staged as invading the mediastinal region of the parietal pleura (pT3). This case is discussed with Dr. Garland on 12/17/2024. A portion the specimen will be sent for PD-L1 IHC and molecular testing. 2. Lung, left upper lobe, additional margin excision: - Negative for carcinoma - Emphysematous change and subpleural bleb 3. Lymph node, left level 9, excision: - 1 lymph node negative for metastatic carcinoma, 0/1 4. Lymph node, level 7, excision: - 1 lymph node negative for metastatic carcinoma, 0/1 5. Lymph node, left level 10, excision: - 3 lymph nodes negative for metastatic carcinoma, 0/3 6. Lymph node, left level 5, excision: - Multiple fragments of lymph node(s) negative for metastatic carcinoma  PDL1>90% mutations: KRAS, TP53, FGFR1, EGFR, KEAP1

## 2025-03-06 NOTE — HISTORY OF PRESENT ILLNESS
[FreeTextEntry1] : Ms. Pompey presents today for consideration for radiation therapy for lung cancer.  She has extensive history including COPD, asthma, glaucoma, cataracts, T2DM, RA, given smoking history was under surveillance for a nodule that increased in size.  1/16/25 NEW CONSULT- Today she notes breathing is stable. She continues to have some pain at the operative site, up to 10/10, managed with oxycodone PRN. She is overwhelmed with medical appointments, notes she will have glaucoma surgery this week.   OTV APPTS:  3/6/25 - OTV - Ms. Pompey has completed 34 Gy / 60 Gy to the LEFT lung.  Today, she reports she is "hanging in there".  She notes odynophagia rated 7/10.  She is taking oxycodone and MMW PRN.  She is eating a regular diet.  Weight stable since last week.  She denies skin irritation.  Using Aquaphor.  Continue RT.    2/27/2025: OTV Ms. Pompey has completed 12/30Fx or 2400/6000cGy radiation to the Left lung. Denies any SOB or difficulty breathing. Lungs CTAB no wheezing noted. She is expressing she has difficulty swallowing so I ordered magic mouth rinse and reviewed with pt. Also experiencing pain and ordered Percocet prn. She has noticed on her right cheek a white head pimple that is tender. no draiange noted and slight tender to touch. She is afebrile. She stated that she had a biopsy by an [employer] MD that performed  a procedure and advised  to follow up with the doctor that performed the procedure and he verbalized she will. She will continue her RT treatments a planned.    2/12/25 OTV- Pt has completed 2/30 fx and 400/6000 cGy to the left lung. She tolerated her treatments. She did not want her VS taken at this time since she was upset, she had a long wait time. She was able to communicate her frustrations nd got Jeannie Garcia in the room to speak to the pt. She denies any difficulty breathing. Chest wall intact no discoloration. She will continue her current RT treatments as planned. ____________________________________________________________________________________________________________________________________________ CT chest 5/30/24 showed: Left upper lobe 0.8 cm subsolid nodule contiguous with and adjacent to an emphysematous cystic air space medially, which demonstrates irregular nodular wall thickening as described above. The exact etiology is unclear, but one differential diagnostic consideration is for primary lung neoplasm. Recommend correlation with PET/CT. No evidence of pulmonary fibrosis as clinically questioned.  CT chest 8/23/2024 showed: When compared to examination dated 5/30/2024 there has been interval development of a part solid nodule measuring 29 x 28 mm in transverse by AP dimensions (9:67) within the apical posterior segment of the left upper lobe. The solid component has increased in size measuring 26 x 12 mm as above. Further imaging to include whole-body PET/CT is suggested to exclude developing adenocarcinoma. Substantial paraseptal and moderate centrilobular emphysema again noted. Multiple stable subcentimeter as well as solid and calcified micronodules as described above.  PET scan 9/25/2024 showed: 1. An intensely FDG avid irregular solid nodule in the paramediastinal left lung apex, compatible with lung malignancy. No hypermetabolic hilar or mediastinal nodes. 2. New superior endplate deformities in T12 and L2 with increased activity may reflect recent trauma; please correlate clinically. 3. No suspicious FDG avid malignant tissue in the remaining field of view.  FNAs done 10/24/24 showed: Final Diagnosis LUNG, LEFT, UPPER LOBE, FNA POSITIVE FOR MALIGNANT CELLS. Non- small cell carcinoma, favor adenocarcinoma The cell block shows scant similar findings. PDL1 40-50%  11/11/2024 EBUS negative.  Brain MRI 11/18/2024 negative  12/10/2024 pathology showed: 1. Lung, left upper lobe with mediastinal pleura, resection: - Poorly differentiated adenocarcinoma, solid (90%) and acinar (10%) patterns - 1.2 cm greatest dimension - Resection edge negative for adenocarcinoma - Fibrous adhesions with infiltrating adenocarcinoma present, see note - Negative for lymphovascular invasion - Negative for REGLA - Emphysematous changes of surrounding lung - See synoptic report below Note: There is extensive fibrosis and inflammation surrounding the tumor. IHC workup reveals the malignant cells are positive for TTF-1. They are negative for Napsin and p40. Per Dr. MAURA Garland, the tumor was adhered to the mediastinal pleura. Fragments of fibrous adhesions containing infiltrating adenocarcinoma are present in the specimen container (grossly described as free-floating tissue). Because adenocarcinoma is present in the adhesions, this lesion will be staged as invading the mediastinal region of the parietal pleura (pT3). This case is discussed with Dr. Garland on 12/17/2024. A portion the specimen will be sent for PD-L1 IHC and molecular testing. 2. Lung, left upper lobe, additional margin excision: - Negative for carcinoma - Emphysematous change and subpleural bleb 3. Lymph node, left level 9, excision: - 1 lymph node negative for metastatic carcinoma, 0/1 4. Lymph node, level 7, excision: - 1 lymph node negative for metastatic carcinoma, 0/1 5. Lymph node, left level 10, excision: - 3 lymph nodes negative for metastatic carcinoma, 0/3 6. Lymph node, left level 5, excision: - Multiple fragments of lymph node(s) negative for metastatic carcinoma  PDL1>90% mutations: KRAS, TP53, FGFR1, EGFR, KEAP1

## 2025-03-06 NOTE — REVIEW OF SYSTEMS
[Anxiety] : anxiety [Depression] : depression [Fever] : no fever [Chills] : no chills [Night Sweats] : no night sweats [Vision Problems] : no vision problems [Dysphagia] : no dysphagia [Odynophagia] : no odynophagia [Mucosal Pain] : no mucosal pain [Chest Pain] : no chest pain [Palpitations] : no palpitations [Shortness Of Breath] : no shortness of breath [Wheezing] : no wheezing [Abdominal Pain] : no abdominal pain [Vomiting] : no vomiting [Dysuria] : no dysuria [Joint Pain] : no joint pain [Skin Rash] : no skin rash [Fainting] : no fainting [Muscle Weakness] : no muscle weakness [Easy Bleeding] : no tendency for easy bleeding [Easy Bruising] : no tendency for easy bruising

## 2025-03-06 NOTE — PHYSICAL EXAM
[Restricted in physically strenuous activity but ambulatory and able to carry out work of a light or sedentary nature] : Status 1- Restricted in physically strenuous activity but ambulatory and able to carry out work of a light or sedentary nature, e.g., light house work, office work [Normal] : affect appropriate [de-identified] : EOMI, no conjunctival injection [de-identified] : supple [de-identified] : No calf tenderness or swelling

## 2025-03-06 NOTE — HISTORY OF PRESENT ILLNESS
[Disease: _____________________] : Disease: [unfilled] [T: ___] : T[unfilled] [N: ___] : N[unfilled] [M: ___] : M[unfilled] [AJCC Stage: ____] : AJCC Stage: [unfilled] [de-identified] : 62F former smoker (27py, quit ~17y ago) referred by Dr. Nieves after surveillance CT scans showing interval growth of CHAPARRITA lesion with biopsy showing lung adenocarcinoma status post resection by Dr. Garland, final pathology with positive margins and mediastinal invasion, here for follow up.  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 Nieves for her COPD/atopic asthma. Given her smoking history, she also has been under surveillance for a lung nodule that has increased in size.    CT Chest 24 Left upper lobe 0.8 cm subsolid nodule contiguous with and adjacent to an emphysematous cystic air space medially, which demonstrates irregular nodular wall thickening as described above. The exact etiology is unclear, but one differential diagnostic consideration is for primary lung neoplasm. Recommend correlation with PET/CT. No evidence of pulmonary fibrosis as clinically questioned.  CT Chest 24 When compared to examination dated 2024 there has been interval development of a part solid nodule measuring 29 x 28 mm in transverse by AP dimensions (9:67) within the apical posterior segment of the left upper lobe. The solid component has increased in size measuring 26 x 12 mm as above. Further imaging to include whole-body PET/CT is suggested to exclude developing adenocarcinoma. Substantial paraseptal and moderate centrilobular emphysema again noted. Multiple stable subcentimeter as well as solid and calcified micronodules as described above.  PET-CT 24 Abnormal skull-to-thigh FDG-PET/CT scan. 1. An intensely FDG avid irregular solid nodule in the paramediastinal left lung apex, compatible with lung malignancy. No hypermetabolic hilar or mediastinal nodes. 2. New superior endplate deformities in T12 and L2 with increased activity may reflect recent trauma; please correlate clinically. 3. No suspicious FDG avid malignant tissue in the remaining field of view.  Lung, CHAPARRITA FNA Pathology 10/24/24 POSITIVE FOR MALIGNANT CELLS. Non- small cell carcinoma, favor adenocarcinoma - see Note. The cell block shows scant similar findings.  EBUS 24 ALL ln NEGATIVE  MRI Brain 24 - No evidence of intracranial metastasis.  12/10/2024: Pathology:  1.  Lung, left upper lobe with mediastinal pleura, resection: -   Poorly differentiated adenocarcinoma, solid (90%) and acinar (10%) patterns -   1.2 cm greatest dimension -   Resection edge negative for adenocarcinoma -   Fibrous adhesions with infiltrating adenocarcinoma present, see note -   Negative for lymphovascular invasion -   Negative for REGLA -   Emphysematous changes of surrounding lung -   See synoptic report below  Note: There is extensive fibrosis and inflammation surrounding the tumor. IHC workup reveals the malignant cells are positive for TTF-1.  They are negative for Napsin and p40.  Per Dr. MAURA Garland, the tumor was adhered to the mediastinal pleura.  Fragments of fibrous adhesions containing infiltrating adenocarcinoma are present in the specimen container (grossly described as free-floating tissue).  Because adenocarcinoma is present in the adhesions, this lesion will be staged as invading the mediastinal region of the parietal  pleura (pT3).  This case is discussed with Dr. Garland on 2024.  A portion the specimen will be sent for PD-L1 IHC and molecular testing.  2.  Lung, left upper lobe, additional margin excision: -   Negative for carcinoma -   Emphysematous change and subpleural bleb 3.  Lymph node, left level 9, excision: 1 lymph node negative for metastatic carcinoma, 0/1 4.  Lymph node, level 7, excision: 1 lymph node negative for metastatic carcinoma, 0/1 5.  Lymph node, left level 10, excision: 3 lymph nodes negative for metastatic carcinoma, 0/3 6.  Lymph node, left level 5, excision: Multiple fragments of lymph node(s) negative for metastatic carcinoma  Verified by: Rickey Cordero M.D. Reported on: 24 16:08 [facility], [facility], [address] [place] [address], _________________________________________________________________  Comment mol 1A  Synoptic Summary 1: Lung - Resection Procedure:  Wedge resection Specimen Laterality:   Left Tumor Focality:   Single focus Tumor Site:  Upper lobe of lung Total Tumor Size:   1.2 Centimeters (cm) Size of Invasive Component:   1.2 Centimeters (cm) Histologic Type:   Invasive solid adenocarcinoma Histologic Patterns Present:   Acinar - 10%;  Solid - 90% Histologic Grade:   G3, poorly differentiated Spread Through Air Spaces (REGLA):    Not identified Visceral Pleura Invasion:   Present Direct Invasion of Adjacent Structures:    Present Involved Adjacent Structures:   Mediastinal region of parietal pleura Treatment Effect:   No known presurgical therapy Lymphovascular Invasion:   Not identified Margin Status for Invasive Carcinoma:    All margins negative for invasive carcinoma Closest Margin(s) to Invasive Carcinoma:    Parenchymal Distance from Invasive Carcinoma to Closest Margin:    Greater than 0.8 cm Margin Status for Non-Invasive Tumor:    Not applicable Lymph Node(s) from Prior Procedures:    No known prior lymph node sampling performed Regional Lymph Node Status:   All regional lymph nodes negative for tumor Number of Lymph Nodes Examined:   At least 6 Amira Site(s) Examined:   7: Subcarinal;  5: Subaortic / aortopulmonary (AP) / AP window;  9L: Pulmonary ligament; 10L: Hilar Pathologic Stage Classification (pTNM, AJCC 8th Edition) pT Category:   pT4 pN Category:   pN0 Best Tumor Blocks for Future Studies Tumor Block(s):   1A  25: TTB - - Surgical pathology was reviewed, pleural margin is positive.   25: CT Chest:  1. Enlarging left superior mediastinal lesion with probable central necrosis measuring 25 x 25 mm (it measured 16 x 15 mm on whole-body PET/CT dated 2024. This may represent mediastinal tumor progression with central necrosis versus postsurgical changes/abscess. Clinical and laboratory correlation and follow-up whole-body PET/CT may be of value. 2. Interval postsurgical changes with chain sutures in the apical posterior segment of the left upper lobe in the paramediastinal region with increasing masslike consolidation in this region measuring 4 x 19 mm. Abbreviated differential includes tumor progression, postsurgical/radiation changes. Whole-body PET/CT correlation and short interval surveillance is suggested. 3. Additional stable findings as described above. [de-identified] : Adenocarcinoma - PDL1 40-50%, KRAS G12C [de-identified] : Positive margins, invasion of parietal pleura [de-identified] : Pulm: Dr. Nieves CTSx: Dr. Garland [FreeTextEntry1] : . 2/4/25 = C1D1 carbo/taxol 2/11/25 = C1D8 carbo/taxol + RT [de-identified] : Here for C5 carbo/taxol. Cont to experience sciatica as well as pain for which she takes oxycodone 10mg PRN. Having issues with constpation related to oxycodone usage. Intermittent n/v persists, she notes that this symptom preceded her cancer dx and treatment

## 2025-03-06 NOTE — END OF VISIT
[] : Fellow [FreeTextEntry3] : Seen with , oncology fellow.  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 She offers no current complaints.  No fevers, chills, shortness of breath, dysphagia. I reviewed independently the images of her CT chest (1/22/25) and also discussed the images with Dr. Nieves, Dr. Coto,  as well as radiology.  This could be consistent with for postoperative change vs regrowing tumor.  After multidisciplinary discussion decision was made to proceed with chemoradiation therapy and repeat a scan after completion. --c/w weekly chemo, RTC with  in 3 wks with C1D29 [Time Spent: ___ minutes] : I have spent [unfilled] minutes of time on the encounter which excludes teaching and separately reported services.

## 2025-03-06 NOTE — PHYSICAL EXAM
[] : no respiratory distress [Exaggerated Use Of Accessory Muscles For Inspiration] : no accessory muscle use [Normal] : normal skin color and pigmentation and no rash

## 2025-03-06 NOTE — PHYSICAL EXAM
[Restricted in physically strenuous activity but ambulatory and able to carry out work of a light or sedentary nature] : Status 1- Restricted in physically strenuous activity but ambulatory and able to carry out work of a light or sedentary nature, e.g., light house work, office work [Normal] : affect appropriate [de-identified] : EOMI, no conjunctival injection [de-identified] : supple [de-identified] : No calf tenderness or swelling

## 2025-03-06 NOTE — ASSESSMENT
[FreeTextEntry1] : 61 yo F with smoking history, who quit 17 yrs ago here for follow up of KRAS G12C, PDL1 40-50%, NSCLC, stage IIIA, s/p wedge plus MLND on 12/10/2024 with Dr. Garland, now on adjuvant CCRT for + margin disease.   # NSCLC - T4N0M0 All lymph node samples during resection were negative for carcinoma, however there was direct invasion of mediastinum and pleura, which makes the patient T4, not T3, and also pathologically margins were positive and not negative as initially stated, therefore treatment plan has changed after multidisciplinary tumor board discussion for postop concurrent chemoradiation therapy. --Zofran as needed for nausea, no other premeds or post-meds needed --Labs reviewed; cleared for fifth week of carbo/taxol today -- refer to palliative care and PMR for management of pain symptoms and sciatica  --The patient is on therapy with carboplatin/Taxol requiring intensive monitoring for toxicity with CBC, CMP  RTC next week for provider visit and treatment

## 2025-03-11 NOTE — REVIEW OF SYSTEMS
[Constipation: Grade 0] : Constipation: Grade 0 [Diarrhea: Grade 0] : Diarrhea: Grade 0 [Dysphagia: Grade 2 - Symptomatic and altered eating/swallowing] : Dysphagia: Grade 2 - Symptomatic and altered eating/swallowing [Esophagitis: Grade 2 - Symptomatic; altered eating/swallowing;  oral supplements indicated] : Esophagitis: Grade 2 - Symptomatic; altered eating/swallowing;  oral supplements indicated [Gastroparesis: Grade 0] : Gastroparesis: Grade 0 [Nausea: Grade 0] : Nausea: Grade 0 [Vomiting: Grade 0] : Vomiting: Grade 0 [Fatigue: Grade 1 - Fatigue relieved by rest] : Fatigue: Grade 1 - Fatigue relieved by rest [Cough: Grade 0] : Cough: Grade 0 [Dyspnea: Grade 0] : Dyspnea: Grade 0 [Hiccups: Grade 0] : Hiccups: Grade 0 [Hoarseness: Grade 0] : Hoarseness: Grade 0 [Hypoxia: Grade 0] : Hypoxia: Grade 0 [Pneumonitis: Grade 0] : Pneumonitis: Grade 0 [Voice Alteration: Grade 0] : Voice Alteration: Grade 0 [Dermatitis Radiation: Grade 1 - Faint erythema or dry desquamation] : Dermatitis Radiation: Grade 1 - Faint erythema or dry desquamation

## 2025-03-11 NOTE — HISTORY OF PRESENT ILLNESS
[FreeTextEntry1] : Ms. Pompey presents today for consideration for radiation therapy for lung cancer.  She has extensive history including COPD, asthma, glaucoma, cataracts, T2DM, RA, given smoking history was under surveillance for a nodule that increased in size.  1/16/25 NEW CONSULT- Today she notes breathing is stable. She continues to have some pain at the operative site, up to 10/10, managed with oxycodone PRN. She is overwhelmed with medical appointments, notes she will have glaucoma surgery this week.   OTV APPTS:  3/11/25 - OTV - Monica Pompey has completed 4000/6000cGy or 20/30fx to the LEFT lung. Today, the patient endorses worsening dysphagia. advised patient to continue oxycodone and MMW prn and to add liquid tylenol to her pain regimen. She denies breathing issues. she is able to eat her meals. continue RT.   3/6/25 - OTV - Ms. Pompey has completed 34 Gy / 60 Gy to the LEFT lung.  Today, she reports she is "hanging in there".  She notes odynophagia rated 7/10.  She is taking oxycodone and MMW PRN.  She is eating a regular diet.  Weight stable since last week.  She denies skin irritation.  Using Aquaphor.  Continue RT.    2/27/2025: OTV Ms. Pompey has completed 12/30Fx or 2400/6000cGy radiation to the Left lung. Denies any SOB or difficulty breathing. Lungs CTAB no wheezing noted. She is expressing she has difficulty swallowing so I ordered magic mouth rinse and reviewed with pt. Also experiencing pain and ordered Percocet prn. She has noticed on her right cheek a white head pimple that is tender. no draiange noted and slight tender to touch. She is afebrile. She stated that she had a biopsy by an [employer] MD that performed  a procedure and advised  to follow up with the doctor that performed the procedure and he verbalized she will. She will continue her RT treatments a planned.    2/12/25 OTV- Pt has completed 2/30 fx and 400/6000 cGy to the left lung. She tolerated her treatments. She did not want her VS taken at this time since she was upset, she had a long wait time. She was able to communicate her frustrations nd got Jeannie Garcia in the room to speak to the pt. She denies any difficulty breathing. Chest wall intact no discoloration. She will continue her current RT treatments as planned. ____________________________________________________________________________________________________________________________________________ CT chest 5/30/24 showed: Left upper lobe 0.8 cm subsolid nodule contiguous with and adjacent to an emphysematous cystic air space medially, which demonstrates irregular nodular wall thickening as described above. The exact etiology is unclear, but one differential diagnostic consideration is for primary lung neoplasm. Recommend correlation with PET/CT. No evidence of pulmonary fibrosis as clinically questioned.  CT chest 8/23/2024 showed: When compared to examination dated 5/30/2024 there has been interval development of a part solid nodule measuring 29 x 28 mm in transverse by AP dimensions (9:67) within the apical posterior segment of the left upper lobe. The solid component has increased in size measuring 26 x 12 mm as above. Further imaging to include whole-body PET/CT is suggested to exclude developing adenocarcinoma. Substantial paraseptal and moderate centrilobular emphysema again noted. Multiple stable subcentimeter as well as solid and calcified micronodules as described above.  PET scan 9/25/2024 showed: 1. An intensely FDG avid irregular solid nodule in the paramediastinal left lung apex, compatible with lung malignancy. No hypermetabolic hilar or mediastinal nodes. 2. New superior endplate deformities in T12 and L2 with increased activity may reflect recent trauma; please correlate clinically. 3. No suspicious FDG avid malignant tissue in the remaining field of view.  FNAs done 10/24/24 showed: Final Diagnosis LUNG, LEFT, UPPER LOBE, FNA POSITIVE FOR MALIGNANT CELLS. Non- small cell carcinoma, favor adenocarcinoma The cell block shows scant similar findings. PDL1 40-50%  11/11/2024 EBUS negative.  Brain MRI 11/18/2024 negative  12/10/2024 pathology showed: 1. Lung, left upper lobe with mediastinal pleura, resection: - Poorly differentiated adenocarcinoma, solid (90%) and acinar (10%) patterns - 1.2 cm greatest dimension - Resection edge negative for adenocarcinoma - Fibrous adhesions with infiltrating adenocarcinoma present, see note - Negative for lymphovascular invasion - Negative for REGLA - Emphysematous changes of surrounding lung - See synoptic report below Note: There is extensive fibrosis and inflammation surrounding the tumor. IHC workup reveals the malignant cells are positive for TTF-1. They are negative for Napsin and p40. Per Dr. MAURA Garland, the tumor was adhered to the mediastinal pleura. Fragments of fibrous adhesions containing infiltrating adenocarcinoma are present in the specimen container (grossly described as free-floating tissue). Because adenocarcinoma is present in the adhesions, this lesion will be staged as invading the mediastinal region of the parietal pleura (pT3). This case is discussed with Dr. Garland on 12/17/2024. A portion the specimen will be sent for PD-L1 IHC and molecular testing. 2. Lung, left upper lobe, additional margin excision: - Negative for carcinoma - Emphysematous change and subpleural bleb 3. Lymph node, left level 9, excision: - 1 lymph node negative for metastatic carcinoma, 0/1 4. Lymph node, level 7, excision: - 1 lymph node negative for metastatic carcinoma, 0/1 5. Lymph node, left level 10, excision: - 3 lymph nodes negative for metastatic carcinoma, 0/3 6. Lymph node, left level 5, excision: - Multiple fragments of lymph node(s) negative for metastatic carcinoma  PDL1>90% mutations: KRAS, TP53, FGFR1, EGFR, KEAP1

## 2025-03-11 NOTE — END OF VISIT
PROBLEM VISIT     Date of service: 2022    Serafin Bowie  Is a 28 y.o.  female    PT's PCP is: No primary care provider on file. : 1990                                          Review of Systems   Constitutional: Negative. HENT: Negative. Respiratory: Negative. Gastrointestinal: Negative. Genitourinary: Positive for menstrual problem (amenorrhea related to pregnancy) and vaginal bleeding (spotting - pink). Negative for vaginal discharge and vaginal pain. Neurological: Negative. Psychiatric/Behavioral: Negative. Patient's last menstrual period was 2022 (exact date). OB History    Para Term  AB Living   3 2 1 1 1 2   SAB IAB Ectopic Molar Multiple Live Births   1       0 2      # Outcome Date GA Lbr Eamon/2nd Weight Sex Delivery Anes PTL Lv   3  21 36w1d 05:55 / 00:31 6 lb 3.7 oz (2.826 kg) F Vag-Spont EPI N [facility]      Complications: Dysfunctional Labor   2 SAB            1 Term 12   7 lb 11 oz (3.487 kg) F Vag-Spont   [facility]        Social History     Tobacco Use   Smoking Status Never Smoker   Smokeless Tobacco Never Used        Social History     Substance and Sexual Activity   Alcohol Use Not Currently       Allergies: No known allergies      Current Outpatient Medications:     labetalol (NORMODYNE) 100 MG tablet, Take 3 tablets by mouth in the morning, at noon, and at bedtime, Disp: 30 tablet, Rfl: 1    Prenatal Vit-Fe Fumarate-FA (PRENATAL VITAMIN) 27-0.8 MG TABS, Take 1 tablet by mouth daily, Disp: 30 tablet, Rfl: 12    NIFEdipine (PROCARDIA) 10 MG capsule, Take 10 mg by mouth 3 times daily  (Patient not taking: Reported on 2022), Disp: , Rfl:     Social History     Substance and Sexual Activity   Sexual Activity Yes       Chief Complaint   Patient presents with    Follow-up     Here for USN follow up. Has not had cycle since March. Pt states USN shows viable pregnancy.  States she did have some spotting late last [] : Fellow night/early this morning. Physical Exam  Constitutional:       Appearance: Normal appearance. She is normal weight. HENT:      Head: Normocephalic. Eyes:      Pupils: Pupils are equal, round, and reactive to light. Pulmonary:      Effort: Pulmonary effort is normal.   Musculoskeletal:         General: Normal range of motion. Cervical back: Normal range of motion. Neurological:      Mental Status: She is alert and oriented to person, place, and time. Skin:     General: Skin is warm and dry. Psychiatric:         Behavior: Behavior normal.   Vitals and nursing note reviewed. Vital Signs  Blood pressure (!) 132/92, weight 200 lb 6.4 oz (90.9 kg), last menstrual period 03/19/2022, unknown if currently breastfeeding. HPI  Here for repeat BP eval and also USN f/u. Feeling well overall. Some nausea, no emesis. Light pink spotting - denies intercourse or pelvic talia. Continues Labetalol 200mg PO TID and report BP at home mainly upper 130's-140's/90's. Denies headache                       Results reviewed today:    USN today   Viable IUP 7 5/7 weeks gestation  Heart tones = 160 beats per minute  CXL = 4.3cm  Ovaries normal bilaterally  Intramural fibroid left uterus 1.2 x 1.2 x 0.9cm                              Assessment and Plan          Diagnosis Orders   1. Amenorrhea, secondary     2. Primary hypertension       Discussed timing of PNI/NOB  Will increase Labetalol to 300mg PO TID - Dr Clint Frankel is agreeable with this. Discussed bASA at 12 weeks gestation        I am having Ariannemarya Lamba start on labetalol. I am also having her maintain her NIFEdipine and Prenatal Vitamin. Return in about 2 weeks (around 5/26/2022) for OB Follow Up. She was also counseled on her preventative health maintenance recommendations and follow-up. There are no Patient Instructions on file for this visit.     TK Herman CNM,5/12/2022 12:48 PM                   Electronically [Time Spent: ___ minutes] : I have spent [unfilled] minutes of time on the encounter which excludes teaching and separately reported services. signed by TK Bynum CNM

## 2025-03-11 NOTE — DISEASE MANAGEMENT
[Clinical] : TNM Stage: c [TTNM] : x [NTNM] : x [MTNM] : x [N/A] : Currently not applicable [de-identified] : 8594 [de-identified] : 9344 [de-identified] : left lung

## 2025-03-14 NOTE — PHYSICAL EXAM
[Restricted in physically strenuous activity but ambulatory and able to carry out work of a light or sedentary nature] : Status 1- Restricted in physically strenuous activity but ambulatory and able to carry out work of a light or sedentary nature, e.g., light house work, office work [Normal] : affect appropriate [de-identified] : EOMI, no conjunctival injection [de-identified] : supple [de-identified] : No calf tenderness or swelling

## 2025-03-14 NOTE — ASSESSMENT
[FreeTextEntry1] : 61 yo F with smoking history, who quit 17 yrs ago here for follow up of KRAS G12C, PDL1 40-50%, NSCLC, stage IIIA, s/p wedge plus MLND on 12/10/2024 with Dr. Garland, now on adjuvant CCRT for + margin disease.   # NSCLC - [id number] All lymph node samples during resection were negative for carcinoma, however there was direct invasion of mediastinum and pleura, which makes the patient T4, not T3, and also pathologically margins were positive and not negative as initially stated, therefore treatment plan has changed after multidisciplinary tumor board discussion for postop concurrent chemoradiation therapy. --Zofran olanzapine & as needed for nausea --Labs reviewed; cleared for C6 carbo/taxel. Noted myelosuppression (WBC 2, ANC 1.2, Hgb 8.5), will continue to trend  -- refer to palliative care and PMR for management of pain symptoms and sciatica (scheduled for April)  Patient is on chemotherapy requiring intensive monitoring for toxicities including cytopenias, renal dysfunction, hepatic dysfunction   Patient is on chemotherapy requiring intensive monitoring for toxicities including cytopenias, renal dysfunction, hepatic dysfunction RTC next week for provider visit and treatment

## 2025-03-14 NOTE — PHYSICAL EXAM
[Restricted in physically strenuous activity but ambulatory and able to carry out work of a light or sedentary nature] : Status 1- Restricted in physically strenuous activity but ambulatory and able to carry out work of a light or sedentary nature, e.g., light house work, office work [Normal] : affect appropriate [de-identified] : EOMI, no conjunctival injection [de-identified] : supple [de-identified] : No calf tenderness or swelling

## 2025-03-14 NOTE — HISTORY OF PRESENT ILLNESS
[Disease: _____________________] : Disease: [unfilled] [T: ___] : T[unfilled] [N: ___] : N[unfilled] [M: ___] : M[unfilled] [AJCC Stage: ____] : AJCC Stage: [unfilled] [de-identified] : 62F former smoker (27py, quit ~17y ago) referred by Dr. Nieves after surveillance CT scans showing interval growth of CHAPARRITA lesion with biopsy showing lung adenocarcinoma status post resection by Dr. Garland, final pathology with positive margins and mediastinal invasion, here for follow up.  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 Nieves for her COPD/atopic asthma. Given her smoking history, she also has been under surveillance for a lung nodule that has increased in size.    CT Chest 24 Left upper lobe 0.8 cm subsolid nodule contiguous with and adjacent to an emphysematous cystic air space medially, which demonstrates irregular nodular wall thickening as described above. The exact etiology is unclear, but one differential diagnostic consideration is for primary lung neoplasm. Recommend correlation with PET/CT. No evidence of pulmonary fibrosis as clinically questioned.  CT Chest 24 When compared to examination dated 2024 there has been interval development of a part solid nodule measuring 29 x 28 mm in transverse by AP dimensions (9:67) within the apical posterior segment of the left upper lobe. The solid component has increased in size measuring 26 x 12 mm as above. Further imaging to include whole-body PET/CT is suggested to exclude developing adenocarcinoma. Substantial paraseptal and moderate centrilobular emphysema again noted. Multiple stable subcentimeter as well as solid and calcified micronodules as described above.  PET-CT 24 Abnormal skull-to-thigh FDG-PET/CT scan. 1. An intensely FDG avid irregular solid nodule in the paramediastinal left lung apex, compatible with lung malignancy. No hypermetabolic hilar or mediastinal nodes. 2. New superior endplate deformities in T12 and L2 with increased activity may reflect recent trauma; please correlate clinically. 3. No suspicious FDG avid malignant tissue in the remaining field of view.  Lung, CHAPARRITA FNA Pathology 10/24/24 POSITIVE FOR MALIGNANT CELLS. Non- small cell carcinoma, favor adenocarcinoma - see Note. The cell block shows scant similar findings.  EBUS 24 ALL ln NEGATIVE  MRI Brain 24 - No evidence of intracranial metastasis.  12/10/2024: Pathology:  1.  Lung, left upper lobe with mediastinal pleura, resection: -   Poorly differentiated adenocarcinoma, solid (90%) and acinar (10%) patterns -   1.2 cm greatest dimension -   Resection edge negative for adenocarcinoma -   Fibrous adhesions with infiltrating adenocarcinoma present, see note -   Negative for lymphovascular invasion -   Negative for REGLA -   Emphysematous changes of surrounding lung -   See synoptic report below  Note: There is extensive fibrosis and inflammation surrounding the tumor. IHC workup reveals the malignant cells are positive for TTF-1.  They are negative for Napsin and p40.  Per Dr. MAURA Garland, the tumor was adhered to the mediastinal pleura.  Fragments of fibrous adhesions containing infiltrating adenocarcinoma are present in the specimen container (grossly described as free-floating tissue).  Because adenocarcinoma is present in the adhesions, this lesion will be staged as invading the mediastinal region of the parietal  pleura (pT3).  This case is discussed with Dr. Garland on 2024.  A portion the specimen will be sent for PD-L1 IHC and molecular testing.  2.  Lung, left upper lobe, additional margin excision: -   Negative for carcinoma -   Emphysematous change and subpleural bleb 3.  Lymph node, left level 9, excision: 1 lymph node negative for metastatic carcinoma, 0/1 4.  Lymph node, level 7, excision: 1 lymph node negative for metastatic carcinoma, 0/1 5.  Lymph node, left level 10, excision: 3 lymph nodes negative for metastatic carcinoma, 0/3 6.  Lymph node, left level 5, excision: Multiple fragments of lymph node(s) negative for metastatic carcinoma  Verified by: Rickey Cordero M.D. Reported on: 24 16:08 [facility], [facility], [address] [place] [address], _________________________________________________________________  Comment mol 1A  Synoptic Summary 1: Lung - Resection Procedure:  Wedge resection Specimen Laterality:   Left Tumor Focality:   Single focus Tumor Site:  Upper lobe of lung Total Tumor Size:   1.2 Centimeters (cm) Size of Invasive Component:   1.2 Centimeters (cm) Histologic Type:   Invasive solid adenocarcinoma Histologic Patterns Present:   Acinar - 10%;  Solid - 90% Histologic Grade:   G3, poorly differentiated Spread Through Air Spaces (REGLA):    Not identified Visceral Pleura Invasion:   Present Direct Invasion of Adjacent Structures:    Present Involved Adjacent Structures:   Mediastinal region of parietal pleura Treatment Effect:   No known presurgical therapy Lymphovascular Invasion:   Not identified Margin Status for Invasive Carcinoma:    All margins negative for invasive carcinoma Closest Margin(s) to Invasive Carcinoma:    Parenchymal Distance from Invasive Carcinoma to Closest Margin:    Greater than 0.8 cm Margin Status for Non-Invasive Tumor:    Not applicable Lymph Node(s) from Prior Procedures:    No known prior lymph node sampling performed Regional Lymph Node Status:   All regional lymph nodes negative for tumor Number of Lymph Nodes Examined:   At least 6 Amira Site(s) Examined:   7: Subcarinal;  5: Subaortic / aortopulmonary (AP) / AP window;  9L: Pulmonary ligament; 10L: Hilar Pathologic Stage Classification (pTNM, AJCC 8th Edition) pT Category:   pT4 pN Category:   pN0 Best Tumor Blocks for Future Studies Tumor Block(s):   1A  25: TTB - - Surgical pathology was reviewed, pleural margin is positive.   25: CT Chest:  1. Enlarging left superior mediastinal lesion with probable central necrosis measuring 25 x 25 mm (it measured 16 x 15 mm on whole-body PET/CT dated 2024. This may represent mediastinal tumor progression with central necrosis versus postsurgical changes/abscess. Clinical and laboratory correlation and follow-up whole-body PET/CT may be of value. 2. Interval postsurgical changes with chain sutures in the apical posterior segment of the left upper lobe in the paramediastinal region with increasing masslike consolidation in this region measuring 4 x 19 mm. Abbreviated differential includes tumor progression, postsurgical/radiation changes. Whole-body PET/CT correlation and short interval surveillance is suggested. 3. Additional stable findings as described above. [de-identified] : Adenocarcinoma - PDL1 40-50%, KRAS G12C [de-identified] : Positive margins, invasion of parietal pleura [de-identified] : Pulm: Dr. Nieves CTSx: Dr. Garland [FreeTextEntry1] : . 2/4/25 = C1D1 carbo/taxol 2/11/25 = C1D8 carbo/taxol + RT [de-identified] : Here for C6 carbo/taxol. Cont to experience sciatica as well as pain for which she takes oxycodone 10mg PRN. Having issues with constpation related to oxycodone usage. Intermittent n/v persists, she notes that this symptom preceded her cancer dx and treatment.

## 2025-03-14 NOTE — HISTORY OF PRESENT ILLNESS
[Disease: _____________________] : Disease: [unfilled] [T: ___] : T[unfilled] [N: ___] : N[unfilled] [M: ___] : M[unfilled] [AJCC Stage: ____] : AJCC Stage: [unfilled] [de-identified] : 62F former smoker (27py, quit ~17y ago) referred by Dr. Nieves after surveillance CT scans showing interval growth of CHAPARRITA lesion with biopsy showing lung adenocarcinoma status post resection by Dr. Garland, final pathology with positive margins and mediastinal invasion, here for follow up.  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 Nieves for her COPD/atopic asthma. Given her smoking history, she also has been under surveillance for a lung nodule that has increased in size.    CT Chest 24 Left upper lobe 0.8 cm subsolid nodule contiguous with and adjacent to an emphysematous cystic air space medially, which demonstrates irregular nodular wall thickening as described above. The exact etiology is unclear, but one differential diagnostic consideration is for primary lung neoplasm. Recommend correlation with PET/CT. No evidence of pulmonary fibrosis as clinically questioned.  CT Chest 24 When compared to examination dated 2024 there has been interval development of a part solid nodule measuring 29 x 28 mm in transverse by AP dimensions (9:67) within the apical posterior segment of the left upper lobe. The solid component has increased in size measuring 26 x 12 mm as above. Further imaging to include whole-body PET/CT is suggested to exclude developing adenocarcinoma. Substantial paraseptal and moderate centrilobular emphysema again noted. Multiple stable subcentimeter as well as solid and calcified micronodules as described above.  PET-CT 24 Abnormal skull-to-thigh FDG-PET/CT scan. 1. An intensely FDG avid irregular solid nodule in the paramediastinal left lung apex, compatible with lung malignancy. No hypermetabolic hilar or mediastinal nodes. 2. New superior endplate deformities in T12 and L2 with increased activity may reflect recent trauma; please correlate clinically. 3. No suspicious FDG avid malignant tissue in the remaining field of view.  Lung, CHAPARRITA FNA Pathology 10/24/24 POSITIVE FOR MALIGNANT CELLS. Non- small cell carcinoma, favor adenocarcinoma - see Note. The cell block shows scant similar findings.  EBUS 24 ALL ln NEGATIVE  MRI Brain 24 - No evidence of intracranial metastasis.  12/10/2024: Pathology:  1.  Lung, left upper lobe with mediastinal pleura, resection: -   Poorly differentiated adenocarcinoma, solid (90%) and acinar (10%) patterns -   1.2 cm greatest dimension -   Resection edge negative for adenocarcinoma -   Fibrous adhesions with infiltrating adenocarcinoma present, see note -   Negative for lymphovascular invasion -   Negative for REGLA -   Emphysematous changes of surrounding lung -   See synoptic report below  Note: There is extensive fibrosis and inflammation surrounding the tumor. IHC workup reveals the malignant cells are positive for TTF-1.  They are negative for Napsin and p40.  Per Dr. MAURA Garland, the tumor was adhered to the mediastinal pleura.  Fragments of fibrous adhesions containing infiltrating adenocarcinoma are present in the specimen container (grossly described as free-floating tissue).  Because adenocarcinoma is present in the adhesions, this lesion will be staged as invading the mediastinal region of the parietal  pleura (pT3).  This case is discussed with Dr. Garland on 2024.  A portion the specimen will be sent for PD-L1 IHC and molecular testing.  2.  Lung, left upper lobe, additional margin excision: -   Negative for carcinoma -   Emphysematous change and subpleural bleb 3.  Lymph node, left level 9, excision: 1 lymph node negative for metastatic carcinoma, 0/1 4.  Lymph node, level 7, excision: 1 lymph node negative for metastatic carcinoma, 0/1 5.  Lymph node, left level 10, excision: 3 lymph nodes negative for metastatic carcinoma, 0/3 6.  Lymph node, left level 5, excision: Multiple fragments of lymph node(s) negative for metastatic carcinoma  Verified by: Rickey Cordero M.D. Reported on: 24 16:08 [facility], [facility], [address] [place] [address], _________________________________________________________________  Comment mol 1A  Synoptic Summary 1: Lung - Resection Procedure:  Wedge resection Specimen Laterality:   Left Tumor Focality:   Single focus Tumor Site:  Upper lobe of lung Total Tumor Size:   1.2 Centimeters (cm) Size of Invasive Component:   1.2 Centimeters (cm) Histologic Type:   Invasive solid adenocarcinoma Histologic Patterns Present:   Acinar - 10%;  Solid - 90% Histologic Grade:   G3, poorly differentiated Spread Through Air Spaces (REGLA):    Not identified Visceral Pleura Invasion:   Present Direct Invasion of Adjacent Structures:    Present Involved Adjacent Structures:   Mediastinal region of parietal pleura Treatment Effect:   No known presurgical therapy Lymphovascular Invasion:   Not identified Margin Status for Invasive Carcinoma:    All margins negative for invasive carcinoma Closest Margin(s) to Invasive Carcinoma:    Parenchymal Distance from Invasive Carcinoma to Closest Margin:    Greater than 0.8 cm Margin Status for Non-Invasive Tumor:    Not applicable Lymph Node(s) from Prior Procedures:    No known prior lymph node sampling performed Regional Lymph Node Status:   All regional lymph nodes negative for tumor Number of Lymph Nodes Examined:   At least 6 Amira Site(s) Examined:   7: Subcarinal;  5: Subaortic / aortopulmonary (AP) / AP window;  9L: Pulmonary ligament; 10L: Hilar Pathologic Stage Classification (pTNM, AJCC 8th Edition) pT Category:   pT4 pN Category:   pN0 Best Tumor Blocks for Future Studies Tumor Block(s):   1A  25: TTB - - Surgical pathology was reviewed, pleural margin is positive.   25: CT Chest:  1. Enlarging left superior mediastinal lesion with probable central necrosis measuring 25 x 25 mm (it measured 16 x 15 mm on whole-body PET/CT dated 2024. This may represent mediastinal tumor progression with central necrosis versus postsurgical changes/abscess. Clinical and laboratory correlation and follow-up whole-body PET/CT may be of value. 2. Interval postsurgical changes with chain sutures in the apical posterior segment of the left upper lobe in the paramediastinal region with increasing masslike consolidation in this region measuring 4 x 19 mm. Abbreviated differential includes tumor progression, postsurgical/radiation changes. Whole-body PET/CT correlation and short interval surveillance is suggested. 3. Additional stable findings as described above. [de-identified] : Adenocarcinoma - PDL1 40-50%, KRAS G12C [de-identified] : Positive margins, invasion of parietal pleura [de-identified] : Pulm: Dr. Nieves CTSx: Dr. Garland [FreeTextEntry1] : . 2/4/25 = C1D1 carbo/taxol 2/11/25 = C1D8 carbo/taxol + RT [de-identified] : Here for C6 carbo/taxol. Cont to experience sciatica as well as pain for which she takes oxycodone 10mg PRN. Having issues with constpation related to oxycodone usage. Intermittent n/v persists, she notes that this symptom preceded her cancer dx and treatment.

## 2025-03-19 NOTE — PHYSICAL EXAM
[Restricted in physically strenuous activity but ambulatory and able to carry out work of a light or sedentary nature] : Status 1- Restricted in physically strenuous activity but ambulatory and able to carry out work of a light or sedentary nature, e.g., light house work, office work [Normal] : affect appropriate [de-identified] : EOMI, no conjunctival injection [de-identified] : supple

## 2025-03-19 NOTE — REASON FOR VISIT
[Consideration of Curative Therapy] : consideration of curative therapy for [Lung Cancer] : lung cancer ABRASION

## 2025-03-19 NOTE — HISTORY OF PRESENT ILLNESS
[Disease: _____________________] : Disease: [unfilled] [T: ___] : T[unfilled] [N: ___] : N[unfilled] [M: ___] : M[unfilled] [AJCC Stage: ____] : AJCC Stage: [unfilled] [de-identified] : 62F former smoker (27py, quit ~17y ago) referred by Dr. Nieves after surveillance CT scans showing interval growth of CHAPARRITA lesion with biopsy showing lung adenocarcinoma status post resection by Dr. Garland, final pathology with positive margins and mediastinal invasion, here for follow up.  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 Nieves for her COPD/atopic asthma. Given her smoking history, she also has been under surveillance for a lung nodule that has increased in size.    CT Chest 24 Left upper lobe 0.8 cm subsolid nodule contiguous with and adjacent to an emphysematous cystic air space medially, which demonstrates irregular nodular wall thickening as described above. The exact etiology is unclear, but one differential diagnostic consideration is for primary lung neoplasm. Recommend correlation with PET/CT. No evidence of pulmonary fibrosis as clinically questioned.  CT Chest 24 When compared to examination dated 2024 there has been interval development of a part solid nodule measuring 29 x 28 mm in transverse by AP dimensions (9:67) within the apical posterior segment of the left upper lobe. The solid component has increased in size measuring 26 x 12 mm as above. Further imaging to include whole-body PET/CT is suggested to exclude developing adenocarcinoma. Substantial paraseptal and moderate centrilobular emphysema again noted. Multiple stable subcentimeter as well as solid and calcified micronodules as described above.  PET-CT 24 Abnormal skull-to-thigh FDG-PET/CT scan. 1. An intensely FDG avid irregular solid nodule in the paramediastinal left lung apex, compatible with lung malignancy. No hypermetabolic hilar or mediastinal nodes. 2. New superior endplate deformities in T12 and L2 with increased activity may reflect recent trauma; please correlate clinically. 3. No suspicious FDG avid malignant tissue in the remaining field of view.  Lung, CHAPARRITA FNA Pathology 10/24/24 POSITIVE FOR MALIGNANT CELLS. Non- small cell carcinoma, favor adenocarcinoma - see Note. The cell block shows scant similar findings.  EBUS 24 ALL ln NEGATIVE  MRI Brain 24 - No evidence of intracranial metastasis.  12/10/2024: Pathology:  1.  Lung, left upper lobe with mediastinal pleura, resection: -   Poorly differentiated adenocarcinoma, solid (90%) and acinar (10%) patterns -   1.2 cm greatest dimension -   Resection edge negative for adenocarcinoma -   Fibrous adhesions with infiltrating adenocarcinoma present, see note -   Negative for lymphovascular invasion -   Negative for REGLA -   Emphysematous changes of surrounding lung -   See synoptic report below  Note: There is extensive fibrosis and inflammation surrounding the tumor. IHC workup reveals the malignant cells are positive for TTF-1.  They are negative for Napsin and p40.  Per Dr. MAURA Garland, the tumor was adhered to the mediastinal pleura.  Fragments of fibrous adhesions containing infiltrating adenocarcinoma are present in the specimen container (grossly described as free-floating tissue).  Because adenocarcinoma is present in the adhesions, this lesion will be staged as invading the mediastinal region of the parietal  pleura (pT3).  This case is discussed with Dr. Garland on 2024.  A portion the specimen will be sent for PD-L1 IHC and molecular testing.  2.  Lung, left upper lobe, additional margin excision: -   Negative for carcinoma -   Emphysematous change and subpleural bleb 3.  Lymph node, left level 9, excision: 1 lymph node negative for metastatic carcinoma, 0/1 4.  Lymph node, level 7, excision: 1 lymph node negative for metastatic carcinoma, 0/1 5.  Lymph node, left level 10, excision: 3 lymph nodes negative for metastatic carcinoma, 0/3 6.  Lymph node, left level 5, excision: Multiple fragments of lymph node(s) negative for metastatic carcinoma  Verified by: Rickey Cordero M.D. Reported on: 24 16:08 [facility], [facility], [address] [place] [address], _________________________________________________________________  Comment mol 1A  Synoptic Summary 1: Lung - Resection Procedure:  Wedge resection Specimen Laterality:   Left Tumor Focality:   Single focus Tumor Site:  Upper lobe of lung Total Tumor Size:   1.2 Centimeters (cm) Size of Invasive Component:   1.2 Centimeters (cm) Histologic Type:   Invasive solid adenocarcinoma Histologic Patterns Present:   Acinar - 10%;  Solid - 90% Histologic Grade:   G3, poorly differentiated Spread Through Air Spaces (REGLA):    Not identified Visceral Pleura Invasion:   Present Direct Invasion of Adjacent Structures:    Present Involved Adjacent Structures:   Mediastinal region of parietal pleura Treatment Effect:   No known presurgical therapy Lymphovascular Invasion:   Not identified Margin Status for Invasive Carcinoma:    All margins negative for invasive carcinoma Closest Margin(s) to Invasive Carcinoma:    Parenchymal Distance from Invasive Carcinoma to Closest Margin:    Greater than 0.8 cm Margin Status for Non-Invasive Tumor:    Not applicable Lymph Node(s) from Prior Procedures:    No known prior lymph node sampling performed Regional Lymph Node Status:   All regional lymph nodes negative for tumor Number of Lymph Nodes Examined:   At least 6 Amira Site(s) Examined:   7: Subcarinal;  5: Subaortic / aortopulmonary (AP) / AP window;  9L: Pulmonary ligament; 10L: Hilar Pathologic Stage Classification (pTNM, AJCC 8th Edition) pT Category:   pT4 pN Category:   pN0 Best Tumor Blocks for Future Studies Tumor Block(s):   1A  25: TTB - - Surgical pathology was reviewed, pleural margin is positive.   25: CT Chest:  1. Enlarging left superior mediastinal lesion with probable central necrosis measuring 25 x 25 mm (it measured 16 x 15 mm on whole-body PET/CT dated 2024. This may represent mediastinal tumor progression with central necrosis versus postsurgical changes/abscess. Clinical and laboratory correlation and follow-up whole-body PET/CT may be of value. 2. Interval postsurgical changes with chain sutures in the apical posterior segment of the left upper lobe in the paramediastinal region with increasing masslike consolidation in this region measuring 4 x 19 mm. Abbreviated differential includes tumor progression, postsurgical/radiation changes. Whole-body PET/CT correlation and short interval surveillance is suggested. 3. Additional stable findings as described above. [de-identified] : Adenocarcinoma - PDL1 40-50%, KRAS G12C [de-identified] : Positive margins, invasion of parietal pleura [de-identified] : Pulm: Dr. Nieves CTSx: Dr. Garland [FreeTextEntry1] : . 2/4/25 = C1D1 carbo/taxol 2/11/25 = C1D8 carbo/taxol + RT [de-identified] : Here for C7. Overall feeling well. Ongoing constipation, 1 episode of vomiting which she attributes to taking meds on empty stomach. No B symptoms. Pain managed well with oxycodone

## 2025-03-19 NOTE — DISEASE MANAGEMENT
[TTNM] : x [NTNM] : x [MTNM] : x [de-identified] : 3951 [de-identified] : 7771 [de-identified] : left lung

## 2025-03-19 NOTE — VITALS
[Maximal Pain Intensity: 3/10] : 3/10 [Least Pain Intensity: 0/10] : 0/10 [Pain Duration: ___] : Pain duration: [unfilled] [80: Normal activity with effort; some signs or symptoms of disease.] : 80: Normal activity with effort; some signs or symptoms of disease.  [ECOG Performance Status: 1 - Restricted in physically strenuous activity but ambulatory and able to carry out work of a light or sedentary nature] : Performance Status: 1 - Restricted in physically strenuous activity but ambulatory and able to carry out work of a light or sedentary nature, e.g., light house work, office work

## 2025-03-19 NOTE — PHYSICAL EXAM
[Restricted in physically strenuous activity but ambulatory and able to carry out work of a light or sedentary nature] : Status 1- Restricted in physically strenuous activity but ambulatory and able to carry out work of a light or sedentary nature, e.g., light house work, office work [Normal] : affect appropriate [de-identified] : EOMI, no conjunctival injection [de-identified] : supple

## 2025-03-19 NOTE — ASSESSMENT
[FreeTextEntry1] : 63 yo F with smoking history, who quit 17 yrs ago here for follow up of KRAS G12C, PDL1 40-50%, NSCLC, stage IIIA, s/p wedge plus MLND on 12/10/2024 with Dr. Garland, now on adjuvant CCRT for + margin disease.   # NSCLC - T4N0M0 All lymph node samples during resection were negative for carcinoma, however there was direct invasion of mediastinum and pleura, which makes the patient T4, not T3, and also pathologically margins were positive and not negative as initially stated, therefore treatment plan has changed after multidisciplinary tumor board discussion for postop concurrent chemoradiation therapy. --Zofran olanzapine PRN for nausea --Labs reviewed; ANC 0.6 therefore will cancel chemo today (C7) -- refer to palliative care and PMR for management of pain symptoms and sciatica (scheduled for April) --PETCT ordered to r/o progressive disease prior to durvalumab therapy  --will need consent for Durvalumab next visit   Patient is on chemotherapy requiring intensive monitoring for toxicities including cytopenias, renal dysfunction, hepatic dysfunction  RTC after PET

## 2025-03-19 NOTE — ASSESSMENT
[FreeTextEntry1] : 61 yo F with smoking history, who quit 17 yrs ago here for follow up of KRAS G12C, PDL1 40-50%, NSCLC, stage IIIA, s/p wedge plus MLND on 12/10/2024 with Dr. Garland, now on adjuvant CCRT for + margin disease.   # NSCLC - T4N0M0 All lymph node samples during resection were negative for carcinoma, however there was direct invasion of mediastinum and pleura, which makes the patient T4, not T3, and also pathologically margins were positive and not negative as initially stated, therefore treatment plan has changed after multidisciplinary tumor board discussion for postop concurrent chemoradiation therapy. --Zofran olanzapine PRN for nausea --Labs reviewed; ANC 0.6 therefore will cancel chemo today (C7) -- refer to palliative care and PMR for management of pain symptoms and sciatica (scheduled for April) --PETCT ordered to r/o progressive disease prior to durvalumab therapy  --will need consent for Durvalumab next visit   Patient is on chemotherapy requiring intensive monitoring for toxicities including cytopenias, renal dysfunction, hepatic dysfunction  RTC after PET

## 2025-03-19 NOTE — HISTORY OF PRESENT ILLNESS
[Disease: _____________________] : Disease: [unfilled] [T: ___] : T[unfilled] [N: ___] : N[unfilled] [M: ___] : M[unfilled] [AJCC Stage: ____] : AJCC Stage: [unfilled] [de-identified] : 62F former smoker (27py, quit ~17y ago) referred by Dr. Nieves after surveillance CT scans showing interval growth of CHAPARRITA lesion with biopsy showing lung adenocarcinoma status post resection by Dr. Garland, final pathology with positive margins and mediastinal invasion, here for follow up.  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 Nieves for her COPD/atopic asthma. Given her smoking history, she also has been under surveillance for a lung nodule that has increased in size.    CT Chest 24 Left upper lobe 0.8 cm subsolid nodule contiguous with and adjacent to an emphysematous cystic air space medially, which demonstrates irregular nodular wall thickening as described above. The exact etiology is unclear, but one differential diagnostic consideration is for primary lung neoplasm. Recommend correlation with PET/CT. No evidence of pulmonary fibrosis as clinically questioned.  CT Chest 24 When compared to examination dated 2024 there has been interval development of a part solid nodule measuring 29 x 28 mm in transverse by AP dimensions (9:67) within the apical posterior segment of the left upper lobe. The solid component has increased in size measuring 26 x 12 mm as above. Further imaging to include whole-body PET/CT is suggested to exclude developing adenocarcinoma. Substantial paraseptal and moderate centrilobular emphysema again noted. Multiple stable subcentimeter as well as solid and calcified micronodules as described above.  PET-CT 24 Abnormal skull-to-thigh FDG-PET/CT scan. 1. An intensely FDG avid irregular solid nodule in the paramediastinal left lung apex, compatible with lung malignancy. No hypermetabolic hilar or mediastinal nodes. 2. New superior endplate deformities in T12 and L2 with increased activity may reflect recent trauma; please correlate clinically. 3. No suspicious FDG avid malignant tissue in the remaining field of view.  Lung, CHAPARRITA FNA Pathology 10/24/24 POSITIVE FOR MALIGNANT CELLS. Non- small cell carcinoma, favor adenocarcinoma - see Note. The cell block shows scant similar findings.  EBUS 24 ALL ln NEGATIVE  MRI Brain 24 - No evidence of intracranial metastasis.  12/10/2024: Pathology:  1.  Lung, left upper lobe with mediastinal pleura, resection: -   Poorly differentiated adenocarcinoma, solid (90%) and acinar (10%) patterns -   1.2 cm greatest dimension -   Resection edge negative for adenocarcinoma -   Fibrous adhesions with infiltrating adenocarcinoma present, see note -   Negative for lymphovascular invasion -   Negative for REGLA -   Emphysematous changes of surrounding lung -   See synoptic report below  Note: There is extensive fibrosis and inflammation surrounding the tumor. IHC workup reveals the malignant cells are positive for TTF-1.  They are negative for Napsin and p40.  Per Dr. MAURA Garland, the tumor was adhered to the mediastinal pleura.  Fragments of fibrous adhesions containing infiltrating adenocarcinoma are present in the specimen container (grossly described as free-floating tissue).  Because adenocarcinoma is present in the adhesions, this lesion will be staged as invading the mediastinal region of the parietal  pleura (pT3).  This case is discussed with Dr. Garland on 2024.  A portion the specimen will be sent for PD-L1 IHC and molecular testing.  2.  Lung, left upper lobe, additional margin excision: -   Negative for carcinoma -   Emphysematous change and subpleural bleb 3.  Lymph node, left level 9, excision: 1 lymph node negative for metastatic carcinoma, 0/1 4.  Lymph node, level 7, excision: 1 lymph node negative for metastatic carcinoma, 0/1 5.  Lymph node, left level 10, excision: 3 lymph nodes negative for metastatic carcinoma, 0/3 6.  Lymph node, left level 5, excision: Multiple fragments of lymph node(s) negative for metastatic carcinoma  Verified by: Rickey Cordero M.D. Reported on: 24 16:08 [facility], [facility], [address] [place] [address], _________________________________________________________________  Comment mol 1A  Synoptic Summary 1: Lung - Resection Procedure:  Wedge resection Specimen Laterality:   Left Tumor Focality:   Single focus Tumor Site:  Upper lobe of lung Total Tumor Size:   1.2 Centimeters (cm) Size of Invasive Component:   1.2 Centimeters (cm) Histologic Type:   Invasive solid adenocarcinoma Histologic Patterns Present:   Acinar - 10%;  Solid - 90% Histologic Grade:   G3, poorly differentiated Spread Through Air Spaces (REGLA):    Not identified Visceral Pleura Invasion:   Present Direct Invasion of Adjacent Structures:    Present Involved Adjacent Structures:   Mediastinal region of parietal pleura Treatment Effect:   No known presurgical therapy Lymphovascular Invasion:   Not identified Margin Status for Invasive Carcinoma:    All margins negative for invasive carcinoma Closest Margin(s) to Invasive Carcinoma:    Parenchymal Distance from Invasive Carcinoma to Closest Margin:    Greater than 0.8 cm Margin Status for Non-Invasive Tumor:    Not applicable Lymph Node(s) from Prior Procedures:    No known prior lymph node sampling performed Regional Lymph Node Status:   All regional lymph nodes negative for tumor Number of Lymph Nodes Examined:   At least 6 Amira Site(s) Examined:   7: Subcarinal;  5: Subaortic / aortopulmonary (AP) / AP window;  9L: Pulmonary ligament; 10L: Hilar Pathologic Stage Classification (pTNM, AJCC 8th Edition) pT Category:   pT4 pN Category:   pN0 Best Tumor Blocks for Future Studies Tumor Block(s):   1A  25: TTB - - Surgical pathology was reviewed, pleural margin is positive.   25: CT Chest:  1. Enlarging left superior mediastinal lesion with probable central necrosis measuring 25 x 25 mm (it measured 16 x 15 mm on whole-body PET/CT dated 2024. This may represent mediastinal tumor progression with central necrosis versus postsurgical changes/abscess. Clinical and laboratory correlation and follow-up whole-body PET/CT may be of value. 2. Interval postsurgical changes with chain sutures in the apical posterior segment of the left upper lobe in the paramediastinal region with increasing masslike consolidation in this region measuring 4 x 19 mm. Abbreviated differential includes tumor progression, postsurgical/radiation changes. Whole-body PET/CT correlation and short interval surveillance is suggested. 3. Additional stable findings as described above. [de-identified] : Adenocarcinoma - PDL1 40-50%, KRAS G12C [de-identified] : Positive margins, invasion of parietal pleura [de-identified] : Pulm: Dr. Nieves CTSx: Dr. Garland [FreeTextEntry1] : . 2/4/25 = C1D1 carbo/taxol 2/11/25 = C1D8 carbo/taxol + RT [de-identified] : Here for C7. Overall feeling well. Ongoing constipation, 1 episode of vomiting which she attributes to taking meds on empty stomach. No B symptoms. Pain managed well with oxycodone

## 2025-03-19 NOTE — HISTORY OF PRESENT ILLNESS
[FreeTextEntry1] : Ms. Pompey presents today for consideration for radiation therapy for lung cancer.  She has extensive history including COPD, asthma, glaucoma, cataracts, T2DM, RA, given smoking history was under surveillance for a nodule that increased in size.  1/16/25 NEW CONSULT- Today she notes breathing is stable. She continues to have some pain at the operative site, up to 10/10, managed with oxycodone PRN. She is overwhelmed with medical appointments; notes she will have glaucoma surgery this week.   OTV APPTS:  3/19/25 - OTV - Monica Pompey has completed 5200/6000cGy or 26/30fx to the LEFT lung. PTE packet given for May follow up. Patient is not on steroids. She is using oxycodone and magic mouthwash PRN for esophagitis. She is breathing well on her own without the use of supplemental O2.  To continue RT.   3/11/25 - OTV - Monica Pompey has completed 4000/6000cGy or 20/30fx to the LEFT lung. Today, the patient endorses worsening dysphagia. advised patient to continue oxycodone and MMW prn and to add liquid tylenol to her pain regimen. She denies breathing issues. she is able to eat her meals. continue RT.  3/6/25 - OTV - Ms. Pompey has completed 34 Gy / 60 Gy to the LEFT lung. Today, she reports she is "hanging in there". She notes odynophagia rated 7/10. She is taking oxycodone and MMW PRN. She is eating a regular diet. Weight stable since last week. She denies skin irritation. Using Aquaphor. Continue RT.  2/27/2025: OTV Ms. Pompey has completed 12/30Fx or 2400/6000cGy radiation to the Left lung. Denies any SOB or difficulty breathing. Lungs CTAB no wheezing noted. She is expressing she has difficulty swallowing so I ordered magic mouth rinse and reviewed with pt. Also experiencing pain and ordered Percocet prn. She has noticed on her right cheek a white head pimple that is tender. no draiange noted and slight tender to touch. She is afebrile. She stated that she had a biopsy by an [facility] MD that performed a procedure and advised to follow up with the doctor that performed the procedure and he verbalized she will. She will continue her RT treatments a planned.  2/12/25 OTV- Pt has completed 2/30 fx and 400/6000 cGy to the left lung. She tolerated her treatments. She did not want her VS taken at this time since she was upset, she had a long wait time. She was able to communicate her frustrations nd got Jeannie Garcia in the room to speak to the pt. She denies any difficulty breathing. Chest wall intact no discoloration. She will continue her current RT treatments as planned. ____________________________________________________________________________________________________________________________________________ CT chest 5/30/24 showed: Left upper lobe 0.8 cm subsolid nodule contiguous with and adjacent to an emphysematous cystic air space medially, which demonstrates irregular nodular wall thickening as described above. The exact etiology is unclear, but one differential diagnostic consideration is for primary lung neoplasm. Recommend correlation with PET/CT. No evidence of pulmonary fibrosis as clinically questioned.  CT chest 8/23/2024 showed: When compared to examination dated 5/30/2024 there has been interval development of a part solid nodule measuring 29 x 28 mm in transverse by AP dimensions (9:67) within the apical posterior segment of the left upper lobe. The solid component has increased in size measuring 26 x 12 mm as above. Further imaging to include whole-body PET/CT is suggested to exclude developing adenocarcinoma. Substantial paraseptal and moderate centrilobular emphysema again noted. Multiple stable subcentimeter as well as solid and calcified micronodules as described above.  PET scan 9/25/2024 showed: 1. An intensely FDG avid irregular solid nodule in the paramediastinal left lung apex, compatible with lung malignancy. No hypermetabolic hilar or mediastinal nodes. 2. New superior endplate deformities in T12 and L2 with increased activity may reflect recent trauma; please correlate clinically. 3. No suspicious FDG avid malignant tissue in the remaining field of view.  FNAs done 10/24/24 showed: Final Diagnosis LUNG, LEFT, UPPER LOBE, FNA POSITIVE FOR MALIGNANT CELLS. Non- small cell carcinoma, favor adenocarcinoma The cell block shows scant similar findings. PDL1 40-50%  11/11/2024 EBUS negative.  Brain MRI 11/18/2024 negative  12/10/2024 pathology showed: 1. Lung, left upper lobe with mediastinal pleura, resection: - Poorly differentiated adenocarcinoma, solid (90%) and acinar (10%) patterns - 1.2 cm greatest dimension - Resection edge negative for adenocarcinoma - Fibrous adhesions with infiltrating adenocarcinoma present, see note - Negative for lymphovascular invasion - Negative for REGLA - Emphysematous changes of surrounding lung - See synoptic report below Note: There is extensive fibrosis and inflammation surrounding the tumor. IHC workup reveals the malignant cells are positive for TTF-1. They are negative for Napsin and p40. Per Dr. MAURA Garland, the tumor was adhered to the mediastinal pleura. Fragments of fibrous adhesions containing infiltrating adenocarcinoma are present in the specimen container (grossly described as free-floating tissue). Because adenocarcinoma is present in the adhesions, this lesion will be staged as invading the mediastinal region of the parietal pleura (pT3). This case is discussed with Dr. Garland on 12/17/2024. A portion the specimen will be sent for PD-L1 IHC and molecular testing. 2. Lung, left upper lobe, additional margin excision: - Negative for carcinoma - Emphysematous change and subpleural bleb 3. Lymph node, left level 9, excision: - 1 lymph node negative for metastatic carcinoma, 0/1 4. Lymph node, level 7, excision: - 1 lymph node negative for metastatic carcinoma, 0/1 5. Lymph node, left level 10, excision: - 3 lymph nodes negative for metastatic carcinoma, 0/3 6. Lymph node, left level 5, excision: - Multiple fragments of lymph node(s) negative for metastatic carcinoma  PDL1>90% mutations: KRAS, TP53, FGFR1, EGFR, KEAP1

## 2025-03-25 NOTE — VITALS
[Maximal Pain Intensity: 9/10] : 9/10 [Least Pain Intensity: 2/10] : 2/10 [80: Normal activity with effort; some signs or symptoms of disease.] : 80: Normal activity with effort; some signs or symptoms of disease.  [ECOG Performance Status: 1 - Restricted in physically strenuous activity but ambulatory and able to carry out work of a light or sedentary nature] : Performance Status: 1 - Restricted in physically strenuous activity but ambulatory and able to carry out work of a light or sedentary nature, e.g., light house work, office work

## 2025-04-14 NOTE — PHYSICAL EXAM
[General Appearance - Alert] : alert [General Appearance - In No Acute Distress] : in no acute distress [] : no respiratory distress [Respiration, Rhythm And Depth] : normal respiratory rhythm and effort [Auscultation Breath Sounds / Voice Sounds] : lungs were clear to auscultation bilaterally [Heart Rate And Rhythm] : heart rate was normal and rhythm regular [Heart Sounds] : normal S1 and S2 [Murmurs] : no murmurs [Abdomen Soft] : soft [Abdomen Tenderness] : non-tender [Abdomen Mass (___ Cm)] : no abdominal mass palpated [Oriented To Time, Place, And Person] : oriented to person, place, and time [Impaired Insight] : insight and judgment were intact [Affect] : the affect was normal

## 2025-04-14 NOTE — ASSESSMENT
[FreeTextEntry1] : - 62F with lung adenocarcinoma (with invasion of parietal pleura), COPD, DM2, RA (chronic steroids), anxiety and depression here for palliative care support.  #Throat pain - Oxycodone 15mg PO q8h PRN - Bowel regimen while on opioids - Narcan intranasal spray sent to pharmacy. Pt and family counseled on proper use.  #Neuropathy Unclear etiology. - Follow-up MRI of spine - Appointment with Dr. Black for later this week to evaluate - Gabapentin 600mg PO TID  #Poor appetite - Dronabinol 2.5mg PO BID  #Anxiety/depression - Referral to Dr. Banerjee of psycho-onc  #ACP See separate GOC note for details - HCP: She Aundrea (daughter, [phone]); alternate HCP: Rich Aundrea (son, [phone]) - Full code  F/u in 1 month

## 2025-04-14 NOTE — GOALS
[Patient] : patient [Time Spent: ___ minutes] : I, personally, spent [unfilled] minutes on advance care planning services with the patient.  This time is separate and distinct from any other care management services provided on this date [Healthcare proxy document is in chart] : Healthcare proxy document is in chart [FreeTextEntry7] : Discussed patient's wishes regarding medical decision makers. She felt that her HCP should be her daughter She with her son as altenrate, however, she also felt strongly that they make decisions together which she was very confident they would. We discussed life support briefly and though she was not interested in making any concrete decisions, she did say that her children knew her wishes and that if she had to live on life support and wasn't conscious, they would make the right decision for her. [FreeTextEntry8] : She Guillaume [FreeTextEntry9] : daughter [de-identified] : [phone]

## 2025-04-14 NOTE — HISTORY OF PRESENT ILLNESS
[FreeTextEntry1] : 62F with lung adenocarcinoma (with invasion of parietal pleura), COPD, DM2, RA (chronic steroids), anxiety and depression here for palliative care support.  Onc Hx: She has been seeing Dr. Nieves for her COPD/atopic asthma. Given her smoking history, she also has been under surveillance for a lung nodule that has increased in size. 8/23/24 - CT Chest - When compared to examination dated 5/30/2024 there has been interval development of a part solid nodule measuring 29 x 28 mm 9/25/24 - PET-CT Abnormal skull-to-thigh FDG-PET/CT scan. 1. An intensely FDG avid irregular solid nodule in the paramediastinal left lung apex, compatible with lung malignancy. No hypermetabolic hilar or mediastinal nodes. 2. New superior endplate deformities in T12 and L2 with increased activity may reflect recent trauma; please correlate clinically. 3. No suspicious FDG avid malignant tissue in the remaining field of view.  12/10/2024: Pathology: Poorly differentiated adenocarcinoma, solid (90%) and acinar (10%) patterns with lymph node involvement Lung - L Wedge resection. Total Tumor Size: 1.2 Centimeters (cm). Invasive solid adenocarcinoma, poorly differentiated   Providers: Oncologist - Oren Ramirez Rad Onc - Gabriella Wernicke Pulwaylon - Dr. Nieves CTSx - Dr. Garland Current Treatment Status: Carbo/taxol + RT (completed RT4/2025)  SYMPTOMS: #Pain Location - R upper leg Quality - numbness, cold Radiation - Up from knee to hip Timing - Constant Aggravating factors - None Minimal acceptable level (0-10 scale) - unable to quantify Severity in last 24h (0-10 scale) - 10/10 Current score (0-10 scale) - 8/10 Started after a visit to the hospital, unclear etiology. Takes gabapentin 600mg TID and oxycodone 15mg BID. Also with throat pain since starting RT, with little pain at rest but much worse on swallowing. Magic Mouthwash helps prior to eating and she takes 3 tablets oxycodone (15mg at a time), twice a day which helps a lot. Oxy 10mg was not sufficient. ISTOP Ref #: [id number]  #N/V Zofran and olanzapine PRN but has been without nausea since break from treatment.  #Poor appetite Never had a great appetite but since diagnosis, has been limited with her appetite and also with loss of taste. Endorses 30 pound weight loss over past 6 months. Also with throat pain on swallowing which has limited her intake.  #Anxiety/Depression Long h/o anxiety and depression. Takes seroquel at night for anxiety but has not been seeing a psychiatrist for some time. Does have a therapist outside of [facility] but only sees them once a month and is looking for someone new.   Advanced Directives: None   SH: Lives with partner of 44 years, Gavin, not . Has a daughter She and son Rich. 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   ROS: If [ ] blank, symptom not present Fatigue [ ] Nausea [ ] Loss of appetite [ ] Unintentional weight loss [ ] Constipation [ ] Diarrhea [ ] Anxiety [ ] Low mood [ ] Other symptoms: [x ] All other review of symptoms negative

## 2025-04-14 NOTE — ASSESSMENT
[FreeTextEntry1] : - 62F with lung adenocarcinoma (with invasion of parietal pleura), COPD, DM2, RA (chronic steroids), anxiety and depression here for palliative care support.  #Throat pain - Oxycodone 15mg PO q8h PRN - Bowel regimen while on opioids - Narcan intranasal spray sent to pharmacy. Pt and family counseled on proper use.  #Neuropathy Unclear etiology. - Follow-up MRI of spine - Appointment with Dr. Black for later this week to evaluate - Gabapentin 600mg PO TID  #Poor appetite - Dronabinol 2.5mg PO BID  #Anxiety/depression - Referral to Dr. Banerjee of psycho-onc  #ACP See separate GOC note for details - HCP: She Aundrea (daughter, [phone]); alternate HCP: Rich Aundrea (son, [phone]) - Full code  F/u in 1 month

## 2025-04-14 NOTE — GOALS
[Patient] : patient [Time Spent: ___ minutes] : I, personally, spent [unfilled] minutes on advance care planning services with the patient.  This time is separate and distinct from any other care management services provided on this date [Healthcare proxy document is in chart] : Healthcare proxy document is in chart [FreeTextEntry7] : Discussed patient's wishes regarding medical decision makers. She felt that her HCP should be her daughter She with her son as altenrate, however, she also felt strongly that they make decisions together which she was very confident they would. We discussed life support briefly and though she was not interested in making any concrete decisions, she did say that her children knew her wishes and that if she had to live on life support and wasn't conscious, they would make the right decision for her. [FreeTextEntry8] : She Guillaume [FreeTextEntry9] : daughter [de-identified] : [phone]

## 2025-04-14 NOTE — HISTORY OF PRESENT ILLNESS
[FreeTextEntry1] : 62F with lung adenocarcinoma (with invasion of parietal pleura), COPD, DM2, RA (chronic steroids), anxiety and depression here for palliative care support.  Onc Hx: She has been seeing Dr. Nieves for her COPD/atopic asthma. Given her smoking history, she also has been under surveillance for a lung nodule that has increased in size. 8/23/24 - CT Chest - When compared to examination dated 5/30/2024 there has been interval development of a part solid nodule measuring 29 x 28 mm 9/25/24 - PET-CT Abnormal skull-to-thigh FDG-PET/CT scan. 1. An intensely FDG avid irregular solid nodule in the paramediastinal left lung apex, compatible with lung malignancy. No hypermetabolic hilar or mediastinal nodes. 2. New superior endplate deformities in T12 and L2 with increased activity may reflect recent trauma; please correlate clinically. 3. No suspicious FDG avid malignant tissue in the remaining field of view.  12/10/2024: Pathology: Poorly differentiated adenocarcinoma, solid (90%) and acinar (10%) patterns with lymph node involvement Lung - L Wedge resection. Total Tumor Size: 1.2 Centimeters (cm). Invasive solid adenocarcinoma, poorly differentiated   Providers: Oncologist - Oren Ramirez Rad Onc - Gabriella Wernicke Pulwaylon - Dr. Nieves CTSx - Dr. Garland Current Treatment Status: Carbo/taxol + RT (completed RT4/2025)  SYMPTOMS: #Pain Location - R upper leg Quality - numbness, cold Radiation - Up from knee to hip Timing - Constant Aggravating factors - None Minimal acceptable level (0-10 scale) - unable to quantify Severity in last 24h (0-10 scale) - 10/10 Current score (0-10 scale) - 8/10 Started after a visit to the hospital, unclear etiology. Takes gabapentin 600mg TID and oxycodone 15mg BID. Also with throat pain since starting RT, with little pain at rest but much worse on swallowing. Magic Mouthwash helps prior to eating and she takes 3 tablets oxycodone (15mg at a time), twice a day which helps a lot. Oxy 10mg was not sufficient. ISTOP Ref #: [id number]  #N/V Zofran and olanzapine PRN but has been without nausea since break from treatment.  #Poor appetite Never had a great appetite but since diagnosis, has been limited with her appetite and also with loss of taste. Endorses 30 pound weight loss over past 6 months. Also with throat pain on swallowing which has limited her intake.  #Anxiety/Depression Long h/o anxiety and depression. Takes seroquel at night for anxiety but has not been seeing a psychiatrist for some time. Does have a therapist outside of [facility] but only sees them once a month and is looking for someone new.   Advanced Directives: None   SH: Lives with partner of 44 years, Gavin, not . Has a daughter She and son Rich. 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   ROS: If [ ] blank, symptom not present Fatigue [ ] Nausea [ ] Loss of appetite [ ] Unintentional weight loss [ ] Constipation [ ] Diarrhea [ ] Anxiety [ ] Low mood [ ] Other symptoms: [x ] All other review of symptoms negative

## 2025-04-17 NOTE — PHYSICAL EXAM
[FreeTextEntry1] : Gen: Patient in no acute distress, breathing comfortably   Spine:   Inspection: Protracted shoulders. Increased thoracic kyphosis. No periscapular atrophy.   Palpation: No TTP to midline spine or paraspinals   ROM: limited in flexion/extension. No pain with transitional movements   Special Tests:  SLR (+)   MSK:     Inspection: Normal bulk with no evidence of atrophy of extremities    ROM: Full functional ROM.    Palpation: TTP to later apect of right hip and along ITB       Neuro:   Sensation: impaired sensation to LT in right lateral thigh to knee   Reflexes: 2+ bilateral patella, 1+ AJ bilateral   Tone: normal   Strength: Demonstrates sit to stand with use of hands and CGA.          LEFT UE - ShAB 5/5, EF 5/5, EE 5/5, WE 5/5, intrinsic 5/5, long finger flexors 5/5         RIGHT UE - ShAB 5/5, EF 5/5, EE 5/5, WE 5/5, intrinsic 5/5, long finger flexors 5/5         LEFT LE - HF 4/5, KE 5/5, DF 5/5, PF 5/5          RIGHT LE - HF 4/5, KE 4/5, DF 4/5, PF 4/5 - give-way weakness/ difficult sustaining resistance   Functional:   Gait: + significant Trendelenburg on RIGHT, uses Rollator, clears toes

## 2025-04-17 NOTE — DATA REVIEWED
[FreeTextEntry1] : 04/04/2025 PET-CT IMPRESSION: 1. In the previously noted area of necrosis at the superior mediastinum, there is focal uptake at an ill-defined soft tissue density, SUV max 5.2, indeterminate in nature. 2. Suture material at the paramedian left lung apex is surrounded by an FDG avid opacity, SUV max 5.0. Favor postradiation changes, although residual disease cannot be excluded. 3. New focal uptake at vertebral body T2, possibly representing an osseous metastasis. Further evaluation with MRI may be of benefit. 4. Increased uptake along the right tongue extending to the base, probably inflammatory in nature. Correlate clinically. FDG avid left level 2A and bilateral level 2B nodes, probably related to oral pathology. 5. Focal uptake below the bladder, possibly representing a urethral diverticulum or urethritis. Correlate clinically. Additional smaller focus of activity at the posterior left labia majora, possibly urinary contamination, although an infected/inflammatory Bartholin gland cyst can have a similar appearance. Direct visualization is recommended to exclude a skin lesion.  04/11/2025 MR Thoracic spine IMPRESSION: The study confirms metastasis to T2. No fracture or epidural extension. No other osseous metastatic lesions are seen.

## 2025-04-17 NOTE — ASSESSMENT
[FreeTextEntry1] : Ms. MONICA POMPEY is a 62 year old female with lung adenocarcinoma (direct invasion of pleura), intermediate T2 lesion, who presents for initial evaluation for right leg pain   Problems / Impression: * RLE pain - suspect lumbosacral radiculopathy vs trochanteric pain syndrome/ gluteal tendinopathy vs hip OA * Chronic post-thoracotomy pain (LEFT)  * Gait/ balance dysfunction - multifactorial  Plan/ Recommendations: - Imaging/ Work-up:  > Ordered MRI lumbar spine and right hip to evaluate for lumbar stenosis / Hip pathology - Referrals:    > Pending imaging above - Therapy:   > Start outpatient PT for lower back, hip, and gait. Script provided - Modalities:  > Advised warm or cold compress to painful areas PRN - Medications:   > Defer pain management to Dr. Law. On Gabapentin for neuropathic pain   > Trial voltaren gel to right hip 2x a day - DME:   > Continue rollator for fall prevention/ energy conservation - Bracing/ Garments: -- - Interventions: -- - Education/ Counseling:   > Fall prevention discussed.  - Follow-up: 4-6 weeks to review imaging   The patient expressed verbal understanding and is in agreement with the plan of care. All of the patient's questions and concerns were addressed during today's visit.   I spent 60 minutes reviewing prior records and interval clinical events, relevant studies, gathering pertinent history, and preparing for the encounter. I counseled the patient on current symptoms, potential etiologies, management to date, anticipated rehabilitation course. Time was also spent communicating with interdisciplinary team and coordination of care.

## 2025-04-17 NOTE — HISTORY OF PRESENT ILLNESS
[FreeTextEntry1] : Ms. MONICA POMPEY is a 62 year old female with lung adenocarcinoma (direct invasion of pleura), intermediate T2 lesion, who presents for initial evaluation for right leg pain   Oncologic Hx: - 24 - CT Chest - When compared to examination dated 2024 there has been interval development of a part solid nodule measuring 29 x 28 mm - 24 - PET-CT 1. An intensely FDG avid irregular solid nodule in the paramediastinal left lung apex, compatible with lung malignancy. No hypermetabolic hilar or mediastinal nodes. 2. New superior endplate deformities in T12 and L2 with increased activity may reflect recent trauma; please correlate clinically. 3. No suspicious FDG avid malignant tissue in the remaining field of view. - 12/10/2024: Pathology: Poorly differentiated adenocarcinoma, solid (90%) and acinar (10%) patterns with lymph node involvement. Lung - L Wedge resection. Total Tumor Size: 1.2 Centimeters (cm). Invasive solid adenocarcinoma, poorly differentiated - s/p RT (completed RT 2025) Current Treatment Status: Carbo/taxol   Pertinent PMHx: Asthma, COPD, DM2, Anxiety/ depression, glaucoma, cataracts, RA (on chronic steroids, currently being tapered, etanercept) --------------------------------------------------- 2025 -- Initial Evaluation: She presents today, accompanied by her daughter, She. She reports severe right leg pain since 2024. She recalls onset of pain occurred during her visit to the ED that month following her lung surgery occurring during the nurse's attempt for drawing blood in her arm. She states that she had a bad experience during that visit as the nurse was having a lot of difficulty finding an IV. She reports she had a bad IV stick and suddenly felt pain in her right leg, described it as a wet/cold sensation. She is adamant that pain started around this time. She reports 3 falls in the last few months with possible trauma and difficulty getting up. Pain is localized right lateral hip and thigh. Pain intensity is rated 8/10 and ranges from 0-10/10. Pain is constant and described as sharp, numb, and burning. She has difficulty finding relief. Pain worse with movement, palpation of thigh, walking. She reports chronic lower back pain but not her main area of pain. She notes weakness in the leg and difficulty ambulating, having to use a rollator for balance.  She also reports some discomfort in left thorax where surgery was done, but more of a numbness sensation at this time.  She sees a rheumatologist who prescribed baclofen for generalized muscle spasm but she has not tried it yet. She sees Dr. Law for pain management, and currently on high dose of gabapentin which she says is helpful.  She had a MRI T spine performed which noted lesion at T2, however she denies any upper back pain at this time. No other fracture is seen.  --------------------------------------------------- Functional Performance Status: - KPS: 80 - ECO - ADLs/ iADLs: Independent - Mobility: Independent, uses Rollator - Physical Activities: household activities, mostly sedentary --------------------------------------------------- Social History: Lives in [place], [place]. Daughter is around for support.  ---------------------------------------------------

## 2025-04-17 NOTE — HISTORY OF PRESENT ILLNESS
[FreeTextEntry1] : Ms. MONICA POMPEY is a 62 year old female with lung adenocarcinoma (direct invasion of pleura), intermediate T2 lesion, who presents for initial evaluation for right leg pain   Oncologic Hx: - 24 - CT Chest - When compared to examination dated 2024 there has been interval development of a part solid nodule measuring 29 x 28 mm - 24 - PET-CT 1. An intensely FDG avid irregular solid nodule in the paramediastinal left lung apex, compatible with lung malignancy. No hypermetabolic hilar or mediastinal nodes. 2. New superior endplate deformities in T12 and L2 with increased activity may reflect recent trauma; please correlate clinically. 3. No suspicious FDG avid malignant tissue in the remaining field of view. - 12/10/2024: Pathology: Poorly differentiated adenocarcinoma, solid (90%) and acinar (10%) patterns with lymph node involvement. Lung - L Wedge resection. Total Tumor Size: 1.2 Centimeters (cm). Invasive solid adenocarcinoma, poorly differentiated - s/p RT (completed RT 2025) Current Treatment Status: Carbo/taxol   Pertinent PMHx: Asthma, COPD, DM2, Anxiety/ depression, glaucoma, cataracts, RA (on chronic steroids, currently being tapered, etanercept) --------------------------------------------------- 2025 -- Initial Evaluation: She presents today, accompanied by her daughter, She. She reports severe right leg pain since 2024. She recalls onset of pain occurred during her visit to the ED that month following her lung surgery occurring during the nurse's attempt for drawing blood in her arm. She states that she had a bad experience during that visit as the nurse was having a lot of difficulty finding an IV. She reports she had a bad IV stick and suddenly felt pain in her right leg, described it as a wet/cold sensation. She is adamant that pain started around this time. She reports 3 falls in the last few months with possible trauma and difficulty getting up. Pain is localized right lateral hip and thigh. Pain intensity is rated 8/10 and ranges from 0-10/10. Pain is constant and described as sharp, numb, and burning. She has difficulty finding relief. Pain worse with movement, palpation of thigh, walking. She reports chronic lower back pain but not her main area of pain. She notes weakness in the leg and difficulty ambulating, having to use a rollator for balance.  She also reports some discomfort in left thorax where surgery was done, but more of a numbness sensation at this time.  She sees a rheumatologist who prescribed baclofen for generalized muscle spasm but she has not tried it yet. She sees Dr. Law for pain management, and currently on high dose of gabapentin which she says is helpful.  She had a MRI T spine performed which noted lesion at T2, however she denies any upper back pain at this time. No other fracture is seen.  --------------------------------------------------- Functional Performance Status: - KPS: 80 - ECO - ADLs/ iADLs: Independent - Mobility: Independent, uses Rollator - Physical Activities: household activities, mostly sedentary --------------------------------------------------- Social History: Lives in [place], [place]. Daughter is around for support.  ---------------------------------------------------

## 2025-04-24 NOTE — HISTORY OF PRESENT ILLNESS
[Former] : former [>= 20 pack years] : >= 20 pack years [TextBox_4] : 62F (accomapnied by her daughter: She) former smoker (smoked about one pack a day for 27 years, quit 17 years ago) with COPD (atopic phenotype) and ICU admissions in the past (never intubated) referred to pulm to establish care. At initial visit was using symbicort and albuterol plus chest vest for airway clearance twice a day. Could only walk maximum 1-2 blocks before she gets short of breath and was chronically on 60mg prednisone daily for many years, and bactrim qd. She is commonly exposed to people who smoke cigarettes which also triggers her COPD. Has RA. Follows with Dr. Dotty Avila. Currently on hydroxychloraquine 100-200mg once a day.  PFTs obtained, demonstrated mod obstruction and restriction, mod reduced DLCO, no BDR. Decreased 6MWT to 252 meters without desaturation. No hypercapnia on ABG. Found to have elevated IgE (411) while ON prednisone 60mg. She was escalated to triple therapy (incruse/symbicort) + azithromycin and instructed to attempt very slow prednisone wean. but had difficulty and thus started on fasenra.  Also sent for CT chest given symptoms and high risk malignancy, found to have CHAPARRITA nodule now confirmed to be adenoca via EUS bx of apical CHAPARRITA nodule, now s/p CHAPARRITA wedge resection, path with some suggestion of mediastinal invasion, T3N0.  COPD: Inhaler: Symbicort 160-4.5, albuterol Last exacerbation: 3/3/24  # of exacerbations since initiating Fasenra: 0 Bicarb 24 (3/24) in HIE  4-25-25 1-22-25 CMP collected after last visit without liver or kidney injury; had a traumatic experience in ER due to issues obtaining labs.  Continues to have left sided discomfort at site of chest tube and left posterior chest- had been taking oxycodone sparingly with some improvement.  States she is not walking much, shortness of breath unchanged from prior to surgery. No wheezing. cough, sputum production or recent URI; endorses compliance with other medications Planned to start concurrent chemoradiation in 2/2025 Planned for eye procedure for elevated eye pressures (glaucoma); possibly also in 2/2025. 6 minute walk test today with 374m walked (improved from prior) Has lost 10lb since surgery in 12/2025; appetite is not great.  [YearQuit] : 2006 [Rheumatologic] : rheumatologic

## 2025-04-24 NOTE — REVIEW OF SYSTEMS
[Fatigue] : fatigue [Cough] : no cough [Chest Tightness] : chest tightness [Dyspnea] : no dyspnea [Pleuritic Pain] : no pleuritic pain [Wheezing] : no wheezing [SOB on Exertion] : sob on exertion [Chest Discomfort] : chest discomfort [Immunocompromised] : immunocompromised [Arthralgias] : arthralgias [Chronic Pain] : chronic pain [Negative] : Psychiatric [TextBox_57] : chronic steroids

## 2025-04-24 NOTE — ASSESSMENT
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 Had significant improvement on Fasenra and triple therapy (Incruse, Breo), and azithromycin. Has been off steroids for months without any recurrent exacerbation, excellent response to biologic. Now recovering from CHAPARRITA wedge resection (12/10/2024); still has left sided discomfort. Removed stitch at left chest tube site. Will send in percocet, and encouraged patient to try lidocaine patches at site.  No changes made to her COPD regimen. Improvement on distance on 6 minute walk test today, PFTs from 12/2024 with improved lung volumes. Encouraged patient to continue regular ambulation; states she will do so, especially now that stitch is removed. Glaucoma does not appear to be acute narrow angle glaucoma, can continue with LAMA.   Follow up in 3 months.

## 2025-04-25 NOTE — HISTORY OF PRESENT ILLNESS
[Disease: _____________________] : Disease: [unfilled] [T: ___] : T[unfilled] [N: ___] : N[unfilled] [M: ___] : M[unfilled] [AJCC Stage: ____] : AJCC Stage: [unfilled] [de-identified] : 62F former smoker (27py, quit ~17y ago) referred by Dr. Nieves after surveillance CT scans showing interval growth of CHAPARRITA lesion with biopsy showing lung adenocarcinoma status post resection by Dr. Garland, final pathology with positive margins and mediastinal invasion, here for follow up.  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 Nieves for her COPD/atopic asthma. Given her smoking history, she also has been under surveillance for a lung nodule that has increased in size.    CT Chest 24 Left upper lobe 0.8 cm subsolid nodule contiguous with and adjacent to an emphysematous cystic air space medially, which demonstrates irregular nodular wall thickening as described above. The exact etiology is unclear, but one differential diagnostic consideration is for primary lung neoplasm. Recommend correlation with PET/CT. No evidence of pulmonary fibrosis as clinically questioned.  CT Chest 24 When compared to examination dated 2024 there has been interval development of a part solid nodule measuring 29 x 28 mm in transverse by AP dimensions (9:67) within the apical posterior segment of the left upper lobe. The solid component has increased in size measuring 26 x 12 mm as above. Further imaging to include whole-body PET/CT is suggested to exclude developing adenocarcinoma. Substantial paraseptal and moderate centrilobular emphysema again noted. Multiple stable subcentimeter as well as solid and calcified micronodules as described above.  PET-CT 24 Abnormal skull-to-thigh FDG-PET/CT scan. 1. An intensely FDG avid irregular solid nodule in the paramediastinal left lung apex, compatible with lung malignancy. No hypermetabolic hilar or mediastinal nodes. 2. New superior endplate deformities in T12 and L2 with increased activity may reflect recent trauma; please correlate clinically. 3. No suspicious FDG avid malignant tissue in the remaining field of view.  Lung, CHAPARRITA FNA Pathology 10/24/24 POSITIVE FOR MALIGNANT CELLS. Non- small cell carcinoma, favor adenocarcinoma - see Note. The cell block shows scant similar findings.  EBUS 24 ALL ln NEGATIVE  MRI Brain 24 - No evidence of intracranial metastasis.  12/10/2024: Pathology:  1.  Lung, left upper lobe with mediastinal pleura, resection: -   Poorly differentiated adenocarcinoma, solid (90%) and acinar (10%) patterns -   1.2 cm greatest dimension -   Resection edge negative for adenocarcinoma -   Fibrous adhesions with infiltrating adenocarcinoma present, see note -   Negative for lymphovascular invasion -   Negative for REGLA -   Emphysematous changes of surrounding lung -   See synoptic report below  Note: There is extensive fibrosis and inflammation surrounding the tumor. IHC workup reveals the malignant cells are positive for TTF-1.  They are negative for Napsin and p40.  Per Dr. MAURA Garland, the tumor was adhered to the mediastinal pleura.  Fragments of fibrous adhesions containing infiltrating adenocarcinoma are present in the specimen container (grossly described as free-floating tissue).  Because adenocarcinoma is present in the adhesions, this lesion will be staged as invading the mediastinal region of the parietal  pleura (pT3).  This case is discussed with Dr. Garland on 2024.  A portion the specimen will be sent for PD-L1 IHC and molecular testing.  2.  Lung, left upper lobe, additional margin excision: -   Negative for carcinoma -   Emphysematous change and subpleural bleb 3.  Lymph node, left level 9, excision: 1 lymph node negative for metastatic carcinoma, 0/1 4.  Lymph node, level 7, excision: 1 lymph node negative for metastatic carcinoma, 0/1 5.  Lymph node, left level 10, excision: 3 lymph nodes negative for metastatic carcinoma, 0/3 6.  Lymph node, left level 5, excision: Multiple fragments of lymph node(s) negative for metastatic carcinoma  Verified by: Rickey Cordero M.D. Reported on: 24 16:08 [facility], [facility], [address] [place] [address], _________________________________________________________________  Comment mol 1A  Synoptic Summary 1: Lung - Resection Procedure:  Wedge resection Specimen Laterality:   Left Tumor Focality:   Single focus Tumor Site:  Upper lobe of lung Total Tumor Size:   1.2 Centimeters (cm) Size of Invasive Component:   1.2 Centimeters (cm) Histologic Type:   Invasive solid adenocarcinoma Histologic Patterns Present:   Acinar - 10%;  Solid - 90% Histologic Grade:   G3, poorly differentiated Spread Through Air Spaces (REGLA):    Not identified Visceral Pleura Invasion:   Present Direct Invasion of Adjacent Structures:    Present Involved Adjacent Structures:   Mediastinal region of parietal pleura Treatment Effect:   No known presurgical therapy Lymphovascular Invasion:   Not identified Margin Status for Invasive Carcinoma:    All margins negative for invasive carcinoma Closest Margin(s) to Invasive Carcinoma:    Parenchymal Distance from Invasive Carcinoma to Closest Margin:    Greater than 0.8 cm Margin Status for Non-Invasive Tumor:    Not applicable Lymph Node(s) from Prior Procedures:    No known prior lymph node sampling performed Regional Lymph Node Status:   All regional lymph nodes negative for tumor Number of Lymph Nodes Examined:   At least 6 Amira Site(s) Examined:   7: Subcarinal;  5: Subaortic / aortopulmonary (AP) / AP window;  9L: Pulmonary ligament; 10L: Hilar Pathologic Stage Classification (pTNM, AJCC 8th Edition) pT Category:   pT4 pN Category:   pN0 Best Tumor Blocks for Future Studies Tumor Block(s):   1A  25: TTB - - Surgical pathology was reviewed, pleural margin is positive.   25: CT Chest:  1. Enlarging left superior mediastinal lesion with probable central necrosis measuring 25 x 25 mm (it measured 16 x 15 mm on whole-body PET/CT dated 2024. This may represent mediastinal tumor progression with central necrosis versus postsurgical changes/abscess. Clinical and laboratory correlation and follow-up whole-body PET/CT may be of value. 2. Interval postsurgical changes with chain sutures in the apical posterior segment of the left upper lobe in the paramediastinal region with increasing masslike consolidation in this region measuring 4 x 19 mm. Abbreviated differential includes tumor progression, postsurgical/radiation changes. Whole-body PET/CT correlation and short interval surveillance is suggested. 3. Additional stable findings as described above.  The patient completed CCRT with carbo/taxol 3/18/2025. PET/CT 25 to assess response revealed:   1. In the previously noted area of necrosis at the superior mediastinum, there is focal uptake at an ill-defined soft tissue density, SUV max 5.2, indeterminate in nature. 2. Suture material at the paramedian left lung apex is surrounded by an FDG avid opacity, SUV max 5.0. Favor postradiation changes, although residual disease cannot be excluded. 3. New focal uptake at vertebral body T2, possibly representing an osseous metastasis. Further evaluation with MRI may be of benefit. 4. Increased uptake along the right tongue extending to the base, probably inflammatory in nature. Correlate clinically. FDG avid left level 2A and bilateral level 2B nodes, probably related to oral pathology. 5. Focal uptake below the bladder, possibly representing a urethral diverticulum or urethritis. Correlate clinically. Additional smaller focus of activity at the posterior left labia majora, possibly urinary contamination, although an infected/inflammatory Bartholin gland cyst can have a similar appearance. Direct visualization is recommended to exclude a skin lesion.  MR  confirmed T2 metastatic lesion.   [de-identified] : Adenocarcinoma - PDL1 40-50%, KRAS G12C [de-identified] : Positive margins, invasion of parietal pleura [de-identified] : Pulm: Dr. Nieves CTSx: Dr. Garland [FreeTextEntry1] : . 2/4/25 = C1D1 carbo/taxol 2/11/25 = C1D8 carbo/taxol + RT [de-identified] : Very drowsy today after taking oxycodone for lower back pain + gabapentin. Feeling nauseous this AM after car ride in. Otherwise condition is unchanged.

## 2025-04-25 NOTE — ASSESSMENT
[FreeTextEntry1] : 63 yo F with smoking history, who quit 17 yrs ago here for follow up of KRAS G12C, PDL1 40-50%, NSCLC, stage IIIA, s/p wedge plus MLND on 12/10/2024 with Dr. Garland, now on adjuvant CCRT for + margin disease.   # NSCLC - reurrent/metastatic  All lymph node samples during resection were negative for carcinoma, however there was direct invasion of mediastinum and pleura, which makes the patient T4, not T3, and also pathologically margins were positive and not negative as initially stated, therefore treatment plan has changed after multidisciplinary tumor board discussion for postop concurrent chemoradiation therapy.  She completed tx 3./18/25. Unfortunately interval scans with T2 metastatic disease.   At this time I recommend treatment with single agent immunotherapy given recent exposure to chemotherapy and PD-L1 score 40-50%. Will reserve KRAS directed treatment to subsequent line. This was discussed with patient and she agrees. Consent signed, will have her RTC once treatment is scheduled.

## 2025-04-25 NOTE — ASSESSMENT
[FreeTextEntry1] : 61 yo F with smoking history, who quit 17 yrs ago here for follow up of KRAS G12C, PDL1 40-50%, NSCLC, stage IIIA, s/p wedge plus MLND on 12/10/2024 with Dr. Garland, now on adjuvant CCRT for + margin disease.   # NSCLC - reurrent/metastatic  All lymph node samples during resection were negative for carcinoma, however there was direct invasion of mediastinum and pleura, which makes the patient T4, not T3, and also pathologically margins were positive and not negative as initially stated, therefore treatment plan has changed after multidisciplinary tumor board discussion for postop concurrent chemoradiation therapy.  She completed tx 3./18/25. Unfortunately interval scans with T2 metastatic disease.   At this time I recommend treatment with single agent immunotherapy given recent exposure to chemotherapy and PD-L1 score 40-50%. Will reserve KRAS directed treatment to subsequent line. This was discussed with patient and she agrees. Consent signed, will have her RTC once treatment is scheduled.

## 2025-04-25 NOTE — PHYSICAL EXAM
[Restricted in physically strenuous activity but ambulatory and able to carry out work of a light or sedentary nature] : Status 1- Restricted in physically strenuous activity but ambulatory and able to carry out work of a light or sedentary nature, e.g., light house work, office work [Normal] : affect appropriate [de-identified] : EOMI, no conjunctival injection [de-identified] : supple [de-identified] : Left upper back area of hyperpigmentation c/w radiation induced dermatitis

## 2025-04-25 NOTE — PHYSICAL EXAM
[Restricted in physically strenuous activity but ambulatory and able to carry out work of a light or sedentary nature] : Status 1- Restricted in physically strenuous activity but ambulatory and able to carry out work of a light or sedentary nature, e.g., light house work, office work [Normal] : affect appropriate [de-identified] : EOMI, no conjunctival injection [de-identified] : supple [de-identified] : Left upper back area of hyperpigmentation c/w radiation induced dermatitis

## 2025-04-25 NOTE — HISTORY OF PRESENT ILLNESS
[Disease: _____________________] : Disease: [unfilled] [T: ___] : T[unfilled] [N: ___] : N[unfilled] [M: ___] : M[unfilled] [AJCC Stage: ____] : AJCC Stage: [unfilled] [de-identified] : 62F former smoker (27py, quit ~17y ago) referred by Dr. Nieves after surveillance CT scans showing interval growth of CHAPARRITA lesion with biopsy showing lung adenocarcinoma status post resection by Dr. Garland, final pathology with positive margins and mediastinal invasion, here for follow up.  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 Nieves for her COPD/atopic asthma. Given her smoking history, she also has been under surveillance for a lung nodule that has increased in size.    CT Chest 24 Left upper lobe 0.8 cm subsolid nodule contiguous with and adjacent to an emphysematous cystic air space medially, which demonstrates irregular nodular wall thickening as described above. The exact etiology is unclear, but one differential diagnostic consideration is for primary lung neoplasm. Recommend correlation with PET/CT. No evidence of pulmonary fibrosis as clinically questioned.  CT Chest 24 When compared to examination dated 2024 there has been interval development of a part solid nodule measuring 29 x 28 mm in transverse by AP dimensions (9:67) within the apical posterior segment of the left upper lobe. The solid component has increased in size measuring 26 x 12 mm as above. Further imaging to include whole-body PET/CT is suggested to exclude developing adenocarcinoma. Substantial paraseptal and moderate centrilobular emphysema again noted. Multiple stable subcentimeter as well as solid and calcified micronodules as described above.  PET-CT 24 Abnormal skull-to-thigh FDG-PET/CT scan. 1. An intensely FDG avid irregular solid nodule in the paramediastinal left lung apex, compatible with lung malignancy. No hypermetabolic hilar or mediastinal nodes. 2. New superior endplate deformities in T12 and L2 with increased activity may reflect recent trauma; please correlate clinically. 3. No suspicious FDG avid malignant tissue in the remaining field of view.  Lung, CHAPARRITA FNA Pathology 10/24/24 POSITIVE FOR MALIGNANT CELLS. Non- small cell carcinoma, favor adenocarcinoma - see Note. The cell block shows scant similar findings.  EBUS 24 ALL ln NEGATIVE  MRI Brain 24 - No evidence of intracranial metastasis.  12/10/2024: Pathology:  1.  Lung, left upper lobe with mediastinal pleura, resection: -   Poorly differentiated adenocarcinoma, solid (90%) and acinar (10%) patterns -   1.2 cm greatest dimension -   Resection edge negative for adenocarcinoma -   Fibrous adhesions with infiltrating adenocarcinoma present, see note -   Negative for lymphovascular invasion -   Negative for REGLA -   Emphysematous changes of surrounding lung -   See synoptic report below  Note: There is extensive fibrosis and inflammation surrounding the tumor. IHC workup reveals the malignant cells are positive for TTF-1.  They are negative for Napsin and p40.  Per Dr. MAURA Garland, the tumor was adhered to the mediastinal pleura.  Fragments of fibrous adhesions containing infiltrating adenocarcinoma are present in the specimen container (grossly described as free-floating tissue).  Because adenocarcinoma is present in the adhesions, this lesion will be staged as invading the mediastinal region of the parietal  pleura (pT3).  This case is discussed with Dr. Garland on 2024.  A portion the specimen will be sent for PD-L1 IHC and molecular testing.  2.  Lung, left upper lobe, additional margin excision: -   Negative for carcinoma -   Emphysematous change and subpleural bleb 3.  Lymph node, left level 9, excision: 1 lymph node negative for metastatic carcinoma, 0/1 4.  Lymph node, level 7, excision: 1 lymph node negative for metastatic carcinoma, 0/1 5.  Lymph node, left level 10, excision: 3 lymph nodes negative for metastatic carcinoma, 0/3 6.  Lymph node, left level 5, excision: Multiple fragments of lymph node(s) negative for metastatic carcinoma  Verified by: Rickey Cordero M.D. Reported on: 24 16:08 [facility], [facility], [address] [place] [address], _________________________________________________________________  Comment mol 1A  Synoptic Summary 1: Lung - Resection Procedure:  Wedge resection Specimen Laterality:   Left Tumor Focality:   Single focus Tumor Site:  Upper lobe of lung Total Tumor Size:   1.2 Centimeters (cm) Size of Invasive Component:   1.2 Centimeters (cm) Histologic Type:   Invasive solid adenocarcinoma Histologic Patterns Present:   Acinar - 10%;  Solid - 90% Histologic Grade:   G3, poorly differentiated Spread Through Air Spaces (REGLA):    Not identified Visceral Pleura Invasion:   Present Direct Invasion of Adjacent Structures:    Present Involved Adjacent Structures:   Mediastinal region of parietal pleura Treatment Effect:   No known presurgical therapy Lymphovascular Invasion:   Not identified Margin Status for Invasive Carcinoma:    All margins negative for invasive carcinoma Closest Margin(s) to Invasive Carcinoma:    Parenchymal Distance from Invasive Carcinoma to Closest Margin:    Greater than 0.8 cm Margin Status for Non-Invasive Tumor:    Not applicable Lymph Node(s) from Prior Procedures:    No known prior lymph node sampling performed Regional Lymph Node Status:   All regional lymph nodes negative for tumor Number of Lymph Nodes Examined:   At least 6 Amira Site(s) Examined:   7: Subcarinal;  5: Subaortic / aortopulmonary (AP) / AP window;  9L: Pulmonary ligament; 10L: Hilar Pathologic Stage Classification (pTNM, AJCC 8th Edition) pT Category:   pT4 pN Category:   pN0 Best Tumor Blocks for Future Studies Tumor Block(s):   1A  25: TTB - - Surgical pathology was reviewed, pleural margin is positive.   25: CT Chest:  1. Enlarging left superior mediastinal lesion with probable central necrosis measuring 25 x 25 mm (it measured 16 x 15 mm on whole-body PET/CT dated 2024. This may represent mediastinal tumor progression with central necrosis versus postsurgical changes/abscess. Clinical and laboratory correlation and follow-up whole-body PET/CT may be of value. 2. Interval postsurgical changes with chain sutures in the apical posterior segment of the left upper lobe in the paramediastinal region with increasing masslike consolidation in this region measuring 4 x 19 mm. Abbreviated differential includes tumor progression, postsurgical/radiation changes. Whole-body PET/CT correlation and short interval surveillance is suggested. 3. Additional stable findings as described above.  The patient completed CCRT with carbo/taxol 3/18/2025. PET/CT 25 to assess response revealed:   1. In the previously noted area of necrosis at the superior mediastinum, there is focal uptake at an ill-defined soft tissue density, SUV max 5.2, indeterminate in nature. 2. Suture material at the paramedian left lung apex is surrounded by an FDG avid opacity, SUV max 5.0. Favor postradiation changes, although residual disease cannot be excluded. 3. New focal uptake at vertebral body T2, possibly representing an osseous metastasis. Further evaluation with MRI may be of benefit. 4. Increased uptake along the right tongue extending to the base, probably inflammatory in nature. Correlate clinically. FDG avid left level 2A and bilateral level 2B nodes, probably related to oral pathology. 5. Focal uptake below the bladder, possibly representing a urethral diverticulum or urethritis. Correlate clinically. Additional smaller focus of activity at the posterior left labia majora, possibly urinary contamination, although an infected/inflammatory Bartholin gland cyst can have a similar appearance. Direct visualization is recommended to exclude a skin lesion.  MR  confirmed T2 metastatic lesion.   [de-identified] : Adenocarcinoma - PDL1 40-50%, KRAS G12C [de-identified] : Positive margins, invasion of parietal pleura [de-identified] : Pulm: Dr. Nieves CTSx: Dr. Garland [FreeTextEntry1] : . 2/4/25 = C1D1 carbo/taxol 2/11/25 = C1D8 carbo/taxol + RT [de-identified] : Very drowsy today after taking oxycodone for lower back pain + gabapentin. Feeling nauseous this AM after car ride in. Otherwise condition is unchanged.

## 2025-04-28 NOTE — HISTORY OF PRESENT ILLNESS
[FreeTextEntry1] : Ms. Monica Pompey is a 62 year-old female former smoker with a PMH of COPD, asthma, glaucoma, cataracts, T2DM, RA who completed radiation therapy 6000 cGy to the LEFT lung from 2/11/25-3/26/25 for a CHAPARRITA lung adenocarcinoma s/p VATS, left upper lobe wedge resection and MLND.  She received concurrent chemotherapy under the care of Dr. Ramirez.    5/13/25 - PTE Ms. Pompey returns for post-treatment evaluation.  She was found to have a new T2 metastasis in the interim.  Dr. Ramirez has recommended single agent immunotherapy.    PET/CT 4/4/25  1. In the previously noted area of necrosis at the superior mediastinum, there is focal uptake at an ill-defined soft tissue density, SUV max 5.2, indeterminate in nature. 2. Suture material at the paramedian left lung apex is surrounded by an FDG avid opacity, SUV max 5.0. Favor postradiation changes, although residual disease cannot be excluded. 3. New focal uptake at vertebral body T2, possibly representing an osseous metastasis. Further evaluation with MRI may be of benefit. 4. Increased uptake along the right tongue extending to the base, probably inflammatory in nature. Correlate clinically. FDG avid left level 2A and bilateral level 2B nodes, probably related to oral pathology. 5. Focal uptake below the bladder, possibly representing a urethral diverticulum or urethritis. Correlate clinically. Additional smaller focus of activity at the posterior left labia majora, possibly urinary contamination, although an infected/inflammatory Bartholin gland cyst can have a similar appearance. Direct visualization is recommended to exclude a skin lesion.  MRI T Spine 4/11/25 The study confirms metastasis to T2. No fracture or epidural extension. No other osseous metastatic lesions are seen. _____________________________________________ ONCOLOGIC HISTORY  Ms. Pompey presents today for consideration for radiation therapy for lung cancer.  She has extensive history including COPD, asthma, glaucoma, cataracts, T2DM, RA, given smoking history was under surveillance for a nodule that increased in size.  1/16/25 NEW CONSULT- Today she notes breathing is stable. She continues to have some pain at the operative site, up to 10/10, managed with oxycodone PRN. She is overwhelmed with medical appointments; notes she will have glaucoma surgery this week. ____________________________________________________________________________________________________________________________________________ CT chest 5/30/24 showed: Left upper lobe 0.8 cm subsolid nodule contiguous with and adjacent to an emphysematous cystic air space medially, which demonstrates irregular nodular wall thickening as described above. The exact etiology is unclear, but one differential diagnostic consideration is for primary lung neoplasm. Recommend correlation with PET/CT. No evidence of pulmonary fibrosis as clinically questioned.  CT chest 8/23/2024 showed: When compared to examination dated 5/30/2024 there has been interval development of a part solid nodule measuring 29 x 28 mm in transverse by AP dimensions (9:67) within the apical posterior segment of the left upper lobe. The solid component has increased in size measuring 26 x 12 mm as above. Further imaging to include whole-body PET/CT is suggested to exclude developing adenocarcinoma. Substantial paraseptal and moderate centrilobular emphysema again noted. Multiple stable subcentimeter as well as solid and calcified micronodules as described above.  PET scan 9/25/2024 showed: 1. An intensely FDG avid irregular solid nodule in the paramediastinal left lung apex, compatible with lung malignancy. No hypermetabolic hilar or mediastinal nodes. 2. New superior endplate deformities in T12 and L2 with increased activity may reflect recent trauma; please correlate clinically. 3. No suspicious FDG avid malignant tissue in the remaining field of view.  FNAs done 10/24/24 showed: Final Diagnosis LUNG, LEFT, UPPER LOBE, FNA POSITIVE FOR MALIGNANT CELLS. Non- small cell carcinoma, favor adenocarcinoma The cell block shows scant similar findings. PDL1 40-50%  11/11/2024 EBUS negative.  Brain MRI 11/18/2024 negative  12/10/2024 pathology showed: 1. Lung, left upper lobe with mediastinal pleura, resection: - Poorly differentiated adenocarcinoma, solid (90%) and acinar (10%) patterns - 1.2 cm greatest dimension - Resection edge negative for adenocarcinoma - Fibrous adhesions with infiltrating adenocarcinoma present, see note - Negative for lymphovascular invasion - Negative for REGLA - Emphysematous changes of surrounding lung - See synoptic report below Note: There is extensive fibrosis and inflammation surrounding the tumor. IHC workup reveals the malignant cells are positive for TTF-1. They are negative for Napsin and p40. Per Dr. MAURA Garland, the tumor was adhered to the mediastinal pleura. Fragments of fibrous adhesions containing infiltrating adenocarcinoma are present in the specimen container (grossly described as free-floating tissue). Because adenocarcinoma is present in the adhesions, this lesion will be staged as invading the mediastinal region of the parietal pleura (pT3). This case is discussed with Dr. Garland on 12/17/2024. A portion the specimen will be sent for PD-L1 IHC and molecular testing. 2. Lung, left upper lobe, additional margin excision: - Negative for carcinoma - Emphysematous change and subpleural bleb 3. Lymph node, left level 9, excision: - 1 lymph node negative for metastatic carcinoma, 0/1 4. Lymph node, level 7, excision: - 1 lymph node negative for metastatic carcinoma, 0/1 5. Lymph node, left level 10, excision: - 3 lymph nodes negative for metastatic carcinoma, 0/3 6. Lymph node, left level 5, excision: - Multiple fragments of lymph node(s) negative for metastatic carcinoma  PDL1>90% mutations: KRAS, TP53, FGFR1, EGFR, KEAP1

## 2025-04-30 NOTE — HISTORY OF PRESENT ILLNESS
[FreeTextEntry1] : 62F with lung adenocarcinoma (with invasion of parietal pleura), COPD, DM2, RA (chronic steroids), anxiety and depression here for palliative care support.  Onc Hx: She has been seeing Dr. Nieves for her COPD/atopic asthma. Given her smoking history, she also has been under surveillance for a lung nodule that has increased in size. 8/23/24 - CT Chest - When compared to examination dated 5/30/2024 there has been interval development of a part solid nodule measuring 29 x 28 mm 9/25/24 - PET-CT Abnormal skull-to-thigh FDG-PET/CT scan. 1. An intensely FDG avid irregular solid nodule in the paramediastinal left lung apex, compatible with lung malignancy. No hypermetabolic hilar or mediastinal nodes. 2. New superior endplate deformities in T12 and L2 with increased activity may reflect recent trauma; please correlate clinically. 3. No suspicious FDG avid malignant tissue in the remaining field of view.  12/10/2024: Pathology: Poorly differentiated adenocarcinoma, solid (90%) and acinar (10%) patterns with lymph node involvement Lung - L Wedge resection. Total Tumor Size: 1.2 Centimeters (cm). Invasive solid adenocarcinoma, poorly differentiated   Providers: Oncologist - Oren Ramirez Rad Onc - Gabriella Wernicke Pulm - Dr. Nieves CTSx - Dr. Garland Current Treatment Status: Carbo/taxol + RT (completed RT4/2025)  SYMPTOMS: #Pain Location - R upper leg Quality - numbness, cold Radiation - Up from knee to hip Timing - Constant Aggravating factors - None Minimal acceptable level (0-10 scale) - unable to quantify Severity in last 24h (0-10 scale) - 10/10 Current score (0-10 scale) - 9/10 Takes gabapentin 600mg BID and oxycodone 15mg PRN (taking on average 2 times a day) and no longer taking them at the same time because she became drowsy after taking them together last week. Takes the oxycodone at the start of the day which helps keep pain from becoming severe. Movement often makes pain better but it seems at its worst at the beginning of the day. ISTOP Ref #: [id number]  #N/V Zofran and olanzapine PRN but has been without nausea since break from treatment.  #Poor appetite Dronabinol 2.5mg BID with great relief in her appetite. Has improved from eating almost nothing due to poor appetite and loss of taste. Endorses 30 pound weight loss over past 6 months.  #Anxiety/Depression Long h/o anxiety and depression. Takes seroquel at night for anxiety but has not been seeing a psychiatrist for some time. Does have a therapist outside of [facility] but only sees them once a month and is looking for someone new.   Advanced Directives: - HCP: She Guillaume (daughter, [phone]); alternate HCP: Rich Guillaume (son, [phone]) - Full code (last discussed 4/14/25)   SH: Lives with partner of 44 years, Gavin, not . Has a daughter She and son Rich. 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   ROS: If [ ] blank, symptom not present Fatigue [ ] Nausea [ ] Loss of appetite [ ] Unintentional weight loss [ ] Constipation [ ] Diarrhea [ ] Anxiety [ ] Low mood [ ] Other symptoms: [x ] All other review of symptoms negative

## 2025-04-30 NOTE — ASSESSMENT
[FreeTextEntry1] : - 62F with lung adenocarcinoma (with invasion of parietal pleura), COPD, DM2, RA (chronic steroids), anxiety and depression here for palliative care support.  #Leg pain - Oxycodone 15mg PO q8h PRN - Bowel regimen while on opioids - Follow-up MRI - F/u with Dr. Black - Narcan intranasal spray sent to pharmacy. Pt and family counseled on proper use. - Gabapentin 600mg PO BID,  with oxycodone so not to be too sedating. Counseled to try gabapentin 300mg to avoid oversedation.  #Poor appetite - Dronabinol 2.5mg PO BID  #Anxiety/depression - Referral to Dr. Banerjee of psycho-onc  #ACP See [facility] note from 4/14/25 for details - HCP: She Guillaume (daughter, [phone]); alternate HCP: Rich Aundrea (son, [phone]) - Full code  F/u in 2 weeks

## 2025-05-02 NOTE — ASSESSMENT
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 Had significant improvement on Fasenra and triple therapy (Incruse, Breo), and azithromycin. Has been off steroids for months without any recurrent exacerbation, excellent response to biologic. Now recovering from CHAPARRITA wedge resection (12/10/2024), initially felt to be localized, but pathology re-reviewed and likely margins were not clear. Now with lesion on spine. On immunotherapy.  No changes made to her COPD regimen. Improvement on distance on 6 minute walk test, PFTs from 12/2024 with improved lung volumes.   Significant time spent with patient who was emotionally distraught with her experience. Attempted to provide emotional support and allow patient to air her grievances.  Would greatly benefit from evaluation for potential treatment options given severe sleep disturbances, anxiety, and PTSD  Follow up in 3 months.

## 2025-05-02 NOTE — REVIEW OF SYSTEMS
[Cough] : no cough [Dyspnea] : no dyspnea [Pleuritic Pain] : no pleuritic pain [Wheezing] : no wheezing [TextBox_57] : chronic steroids

## 2025-05-02 NOTE — HISTORY OF PRESENT ILLNESS
[TextBox_4] : 62F (accomapnied by her daughter: She) former smoker (smoked about one pack a day for 27 years, quit 17 years ago) with COPD (atopic phenotype) and ICU admissions in the past (never intubated) referred to pulm to establish care. At initial visit was using symbicort and albuterol plus chest vest for airway clearance twice a day. Could only walk maximum 1-2 blocks before she gets short of breath and was chronically on 60mg prednisone daily for many years, and bactrim qd. She is commonly exposed to people who smoke cigarettes which also triggers her COPD. Has RA. Follows with Dr. Dotty Avila. Currently on hydroxychloraquine 100-200mg once a day.  PFTs obtained, demonstrated mod obstruction and restriction, mod reduced DLCO, no BDR. Decreased 6MWT to 252 meters without desaturation. No hypercapnia on ABG. Found to have elevated IgE (411) while ON prednisone 60mg. She was escalated to triple therapy (incruse/symbicort) + azithromycin and instructed to attempt very slow prednisone wean. but had difficulty and thus started on fasenra.  Also sent for CT chest given symptoms and high risk malignancy, found to have CHAPARRITA nodule now confirmed to be adenoca via EUS bx of apical CHAPARRITA nodule, now s/p CHAPARRITA wedge resection, path with some suggestion of mediastinal invasion, T3N0.  COPD: Inhaler: Symbicort 160-4.5, albuterol Last exacerbation: 3/3/24  # of exacerbations since initiating Fasenra: 0 Bicarb 24 (3/24) in HIE  5-2-25 Found out last week that her cancer has spread. It is in her spine. Immunotherapy is the plan.  No issues with her breathing lately. No recent COPD hospitalizations. Not getting SOB with walking Uses Incruse daily and Fasenra q 8 weeks.  Patient again very emotional and experiencing severe sleep disturbances due to PTSD after traumatic ED visit.  Has not seen psychiatrist yet, was unaware of appointment but it has been rescheduled for this month     [YearQuit] : 2006

## 2025-05-02 NOTE — ASSESSMENT
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 Had significant improvement on Fasenra and triple therapy (Incruse, Breo), and azithromycin. Has been off steroids for months without any recurrent exacerbation, excellent response to biologic. Now recovering from CHAPARRIAT wedge resection (12/10/2024), initially felt to be localized, but pathology re-reviewed and likely margins were not clear. Now with lesion on spine. On immunotherapy.  No changes made to her COPD regimen. Improvement on distance on 6 minute walk test, PFTs from 12/2024 with improved lung volumes.   Significant time spent with patient who was emotionally distraught with her experience. Attempted to provide emotional support and allow patient to air her grievances.  Would greatly benefit from evaluation for potential treatment options given severe sleep disturbances, anxiety, and PTSD  Follow up in 3 months.

## 2025-05-13 NOTE — HISTORY OF PRESENT ILLNESS
[FreeTextEntry1] : Ms. Monica Pompey is a 62 year-old female former smoker with a PMH of COPD, asthma, glaucoma, cataracts, T2DM, RA who completed radiation therapy 6000 cGy to the LEFT lung from 2/11/25-3/26/25 for a CHAPARRITA lung adenocarcinoma s/p VATS, left upper lobe wedge resection and MLND.  She received concurrent chemotherapy under the care of Dr. Ramirez.    5/13/25 - PTE Ms. Pompey returns for post-treatment evaluation.  She was found to have a new T2 metastasis in the interim.  Dr. Ramirez has recommended single agent immunotherapy, first dose was about 2 weeks ago per patient. She states she had nausea with vomiting following the infusion, but she also believes may be related to her not eating food when taking her medications. She is scheduled to follow up wtih Dr. Ramirez 5/20/25.  She denies shortness of breath, cough, chest pain, skin irritation, dysphagia.   PET/CT 4/4/25  1. In the previously noted area of necrosis at the superior mediastinum, there is focal uptake at an ill-defined soft tissue density, SUV max 5.2, indeterminate in nature. 2. Suture material at the paramedian left lung apex is surrounded by an FDG avid opacity, SUV max 5.0. Favor postradiation changes, although residual disease cannot be excluded. 3. New focal uptake at vertebral body T2, possibly representing an osseous metastasis. Further evaluation with MRI may be of benefit. 4. Increased uptake along the right tongue extending to the base, probably inflammatory in nature. Correlate clinically. FDG avid left level 2A and bilateral level 2B nodes, probably related to oral pathology. 5. Focal uptake below the bladder, possibly representing a urethral diverticulum or urethritis. Correlate clinically. Additional smaller focus of activity at the posterior left labia majora, possibly urinary contamination, although an infected/inflammatory Bartholin gland cyst can have a similar appearance. Direct visualization is recommended to exclude a skin lesion.  MRI T Spine 4/11/25 The study confirms metastasis to T2. No fracture or epidural extension. No other osseous metastatic lesions are seen. _____________________________________________ ONCOLOGIC HISTORY  Ms. Pompey presents today for consideration for radiation therapy for lung cancer.  She has extensive history including COPD, asthma, glaucoma, cataracts, T2DM, RA, given smoking history was under surveillance for a nodule that increased in size.  1/16/25 NEW CONSULT- Today she notes breathing is stable. She continues to have some pain at the operative site, up to 10/10, managed with oxycodone PRN. She is overwhelmed with medical appointments; notes she will have glaucoma surgery this week. ____________________________________________________________________________________________________________________________________________ CT chest 5/30/24 showed: Left upper lobe 0.8 cm subsolid nodule contiguous with and adjacent to an emphysematous cystic air space medially, which demonstrates irregular nodular wall thickening as described above. The exact etiology is unclear, but one differential diagnostic consideration is for primary lung neoplasm. Recommend correlation with PET/CT. No evidence of pulmonary fibrosis as clinically questioned.  CT chest 8/23/2024 showed: When compared to examination dated 5/30/2024 there has been interval development of a part solid nodule measuring 29 x 28 mm in transverse by AP dimensions (9:67) within the apical posterior segment of the left upper lobe. The solid component has increased in size measuring 26 x 12 mm as above. Further imaging to include whole-body PET/CT is suggested to exclude developing adenocarcinoma. Substantial paraseptal and moderate centrilobular emphysema again noted. Multiple stable subcentimeter as well as solid and calcified micronodules as described above.  PET scan 9/25/2024 showed: 1. An intensely FDG avid irregular solid nodule in the paramediastinal left lung apex, compatible with lung malignancy. No hypermetabolic hilar or mediastinal nodes. 2. New superior endplate deformities in T12 and L2 with increased activity may reflect recent trauma; please correlate clinically. 3. No suspicious FDG avid malignant tissue in the remaining field of view.  FNAs done 10/24/24 showed: Final Diagnosis LUNG, LEFT, UPPER LOBE, FNA POSITIVE FOR MALIGNANT CELLS. Non- small cell carcinoma, favor adenocarcinoma The cell block shows scant similar findings. PDL1 40-50%  11/11/2024 EBUS negative.  Brain MRI 11/18/2024 negative  12/10/2024 pathology showed: 1. Lung, left upper lobe with mediastinal pleura, resection: - Poorly differentiated adenocarcinoma, solid (90%) and acinar (10%) patterns - 1.2 cm greatest dimension - Resection edge negative for adenocarcinoma - Fibrous adhesions with infiltrating adenocarcinoma present, see note - Negative for lymphovascular invasion - Negative for REGLA - Emphysematous changes of surrounding lung - See synoptic report below Note: There is extensive fibrosis and inflammation surrounding the tumor. IHC workup reveals the malignant cells are positive for TTF-1. They are negative for Napsin and p40. Per Dr. MAURA Garland, the tumor was adhered to the mediastinal pleura. Fragments of fibrous adhesions containing infiltrating adenocarcinoma are present in the specimen container (grossly described as free-floating tissue). Because adenocarcinoma is present in the adhesions, this lesion will be staged as invading the mediastinal region of the parietal pleura (pT3). This case is discussed with Dr. Garland on 12/17/2024. A portion the specimen will be sent for PD-L1 IHC and molecular testing. 2. Lung, left upper lobe, additional margin excision: - Negative for carcinoma - Emphysematous change and subpleural bleb 3. Lymph node, left level 9, excision: - 1 lymph node negative for metastatic carcinoma, 0/1 4. Lymph node, level 7, excision: - 1 lymph node negative for metastatic carcinoma, 0/1 5. Lymph node, left level 10, excision: - 3 lymph nodes negative for metastatic carcinoma, 0/3 6. Lymph node, left level 5, excision: - Multiple fragments of lymph node(s) negative for metastatic carcinoma  PDL1>90% mutations: KRAS, TP53, FGFR1, EGFR, KEAP1

## 2025-05-14 NOTE — PHYSICAL EXAM
[FreeTextEntry1] : Gen: Patient in no acute distress, breathing comfortably   Spine:   Inspection: Protracted shoulders. Increased thoracic kyphosis. No periscapular atrophy.   Palpation: No TTP to midline spine or paraspinals   ROM: limited in flexion/extension. No pain with transitional movements   Special Tests:  SLR (+)   MSK:     Inspection: Normal bulk with no evidence of atrophy of extremities    ROM: Full functional ROM.    Palpation: TTP to later apect of right hip and along ITB       Neuro:   Sensation: impaired sensation to LT in right lateral thigh to knee   Reflexes: 2+ bilateral patella, 1+ AJ bilateral   Tone: normal   Strength: Demonstrates sit to stand with use of hands and CGA.          LEFT UE - ShAB 5/5, EF 5/5, EE 5/5, WE 5/5, intrinsic 5/5, long finger flexors 5/5         RIGHT UE - ShAB 5/5, EF 5/5, EE 5/5, WE 5/5, intrinsic 5/5, long finger flexors 5/5         LEFT LE - HF 5/5, KE 5/5, DF 5/5, PF 5/5          RIGHT LE - HF 5/5, KE 5/5, DF 5/5, PF 5/5    Functional:   Gait: + significant Trendelenburg on RIGHT, uses Rollator, clears toes

## 2025-05-14 NOTE — HISTORY OF PRESENT ILLNESS
[FreeTextEntry1] : Ms. MONICA POMPEY is a 62 year old female with lung adenocarcinoma (direct invasion of pleura), intermediate T2 lesion, who presents for follow up for right leg pain and gait/ balance impairments   Oncologic Hx: - 24 - CT Chest - When compared to examination dated 2024 there has been interval development of a part solid nodule measuring 29 x 28 mm - 24 - PET-CT 1. An intensely FDG avid irregular solid nodule in the paramediastinal left lung apex, compatible with lung malignancy. No hypermetabolic hilar or mediastinal nodes. 2. New superior endplate deformities in T12 and L2 with increased activity may reflect recent trauma; please correlate clinically. 3. No suspicious FDG avid malignant tissue in the remaining field of view. - 12/10/2024: Pathology: Poorly differentiated adenocarcinoma, solid (90%) and acinar (10%) patterns with lymph node involvement. Lung - L Wedge resection. Total Tumor Size: 1.2 Centimeters (cm). Invasive solid adenocarcinoma, poorly differentiated - s/p RT (completed RT 2025) Current Treatment Status: Carbo/taxol   Pertinent PMHx: Asthma, COPD, DM2, Anxiety/ depression, glaucoma, cataracts, RA (on chronic steroids, currently being tapered, etanercept) --------------------------------------------------- 2025 -- Clinic Follow up: She presents today with her daughter, She. Saw Palliative care (Dr. Law) today, recommended adjusting pain regimen to avoid oversedation. On Oxycodone and gabapentin. She feels her current pain regimen is helping her right leg pain. She continues to report dysesthesias and numbness in right thigh and left flank. Symptoms fluctuate but are manageable with current regimen. Leg weakness has improved. Continues to use rollator for stability. Has a trip at the end of the month she is looking forward to.  She is pending and MRI lumbar spine and right hip. Deferring PT until after her trip.  2025 -- Initial Evaluation: She presents today, accompanied by her daughter, She. She reports severe right leg pain since 2024. She recalls onset of pain occurred during her visit to the ED that month following her lung surgery occurring during the nurse's attempt for drawing blood in her arm. She states that she had a bad experience during that visit as the nurse was having a lot of difficulty finding an IV. She reports she had a bad IV stick and suddenly felt pain in her right leg, described it as a wet/cold sensation. She is adamant that pain started around this time. She reports 3 falls in the last few months with possible trauma and difficulty getting up. Pain is localized right lateral hip and thigh. Pain intensity is rated 8/10 and ranges from 0-10/10. Pain is constant and described as sharp, numb, and burning. She has difficulty finding relief. Pain worse with movement, palpation of thigh, walking. She reports chronic lower back pain but not her main area of pain. She notes weakness in the leg and difficulty ambulating, having to use a rollator for balance.  She also reports some discomfort in left thorax where surgery was done, but more of a numbness sensation at this time.  She sees a rheumatologist who prescribed baclofen for generalized muscle spasm but she has not tried it yet. She sees Dr. Law for pain management, and currently on high dose of gabapentin which she says is helpful.  She had a MRI T spine performed which noted lesion at T2, however she denies any upper back pain at this time. No other fracture is seen.  --------------------------------------------------- Functional Performance Status: - KPS: 80 - ECO - ADLs/ iADLs: Independent - Mobility: Independent, uses Rollator - Physical Activities: household activities, mostly sedentary --------------------------------------------------- Social History: Lives in [place], [place]. Daughter is around for support.  ---------------------------------------------------

## 2025-05-14 NOTE — ASSESSMENT
[FreeTextEntry1] : - 62F with lung adenocarcinoma (with invasion of parietal pleura), COPD, DM2, RA (chronic steroids), anxiety and depression here for palliative care support.  #Leg pain - Oxycodone 15mg PO q8h PRN - Bowel regimen while on opioids - Follow-up MRI - F/u with Dr. Black - Narcan intranasal spray sent to pharmacy previously - Gabapentin 600mg PO BID,  with oxycodone so not to be too sedating. Counseled to try gabapentin 300mg to avoid oversedation.  #Poor appetite - Dronabinol 10mg PO BID  #Anxiety/depression - Referred to Dr. Banerjee of psycho-onc  #ACP See [facility] note from 4/14/25 for details - HCP: She Guillaume (daughter, [phone]); alternate HCP: Richyolie Guillaume (son, [phone]) - Full code  F/u in 2 weeks

## 2025-05-14 NOTE — ASSESSMENT
[FreeTextEntry1] : Ms. MONICA POMPEY is a 62-year-old female with lung adenocarcinoma (direct invasion of pleura), intermediate T2 lesion, who presents for follow up for right leg pain and gait impairments   Problems / Impression: * RLE pain - suspect lumbosacral radiculopathy vs trochanteric pain syndrome/ gluteal tendinopathy vs hip OA * Chronic post-thoracotomy pain (LEFT)  * Gait/ balance dysfunction - multifactorial  Plan/ Recommendations: - Imaging/ Work-up:  > Ordered MRI lumbar spine and right hip to evaluate for lumbar stenosis / Hip pathology, pending - Referrals:    > Pending imaging above - Therapy:   > Defers outpatient PT for lower back, hip, and gait. until after imaging and her trip this month - Modalities: as tolerated - Medications:   > Defer pain management to Dr. Law. On Gabapentin for neuropathic pain - DME:   > Continue rollator for fall prevention/ energy conservation - Bracing/ Garments: -- - Interventions: -- - Follow-up: 6-8 weeks to review imaging   The patient expressed verbal understanding and is in agreement with the plan of care. All of the patient's questions and concerns were addressed during today's visit.

## 2025-05-14 NOTE — HISTORY OF PRESENT ILLNESS
[FreeTextEntry1] : 61 yo F with lung adenocarcinoma (with invasion of parietal pleura), COPD, DM2, RA (chronic steroids), anxiety and depression here for palliative care support.  Onc Hx: She has been seeing Dr. Nieves for her COPD/atopic asthma. Given her smoking history, she also has been under surveillance for a lung nodule that has increased in size. 8/23/24 - CT Chest - When compared to examination dated 5/30/2024 there has been interval development of a part solid nodule measuring 29 x 28 mm 9/25/24 - PET-CT Abnormal skull-to-thigh FDG-PET/CT scan. 1. An intensely FDG avid irregular solid nodule in the paramediastinal left lung apex, compatible with lung malignancy. No hypermetabolic hilar or mediastinal nodes. 2. New superior endplate deformities in T12 and L2 with increased activity may reflect recent trauma; please correlate clinically. 3. No suspicious FDG avid malignant tissue in the remaining field of view.  12/10/2024: Pathology: Poorly differentiated adenocarcinoma, solid (90%) and acinar (10%) patterns with lymph node involvement Lung - L Wedge resection. Total Tumor Size: 1.2 Centimeters (cm). Invasive solid adenocarcinoma, poorly differentiated   Providers: Oncologist - Oren Mercado Onc - Gabriella Wernicke Pulwaylon - Dr. Nieves CTSx - Dr. Garland Current Treatment Status: Carbo/taxol + RT (completed RT4/2025)  Interval Hx: Lost additional 3 pounds despite increasing dronabinol to 2 tablets at a time, once a day.  SYMPTOMS: #Pain Location - R upper leg Quality - numbness, cold Radiation - Up from knee to hip Timing - Constant Aggravating factors - None Minimal acceptable level (0-10 scale) - unable to quantify Severity in last 24h (0-10 scale) - 5/10 Current score (0-10 scale) - 5/10 Takes gabapentin 600mg BID and oxycodone 15mg PRN (taking on average 2-3 times a day). Takes the oxycodone at the start of the day which helps keep pain from becoming severe. Movement often makes pain better but it seems at its worst at the beginning of the day. ISTOP Ref #: [id number]  #N/V Zofran and olanzapine PRN.  #Poor appetite Started taking Dronabinol 5mg daily with improvement in appetite but does not feel it is helping as much as initially. Lost additional 3 pounds.  #Anxiety/Depression Long h/o anxiety and depression. Takes seroquel at night for anxiety but has not been seeing a psychiatrist for some time. Does have an appointment with psycho-onc for therapy scheduled for tomorrow.   Advanced Directives: - HCP: She Guillaume (daughter, [phone]); alternate HCP: Rich Guillaume (son, [phone]) - Full code (last discussed 4/14/25)   SH: Lives with partner of 44 years, Gavin, not . Has a daughter She and son Rich. 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   ROS: If [ ] blank, symptom not present Fatigue [ ] Nausea [ ] Loss of appetite [ ] Unintentional weight loss [ ] Constipation [ ] Diarrhea [ ] Anxiety [ ] Low mood [ ] Other symptoms: [x ] All other review of symptoms negative

## 2025-05-14 NOTE — PHYSICAL EXAM
[General Appearance - Alert] : alert [General Appearance - In No Acute Distress] : in no acute distress [] : no respiratory distress [Respiration, Rhythm And Depth] : normal respiratory rhythm and effort [Auscultation Breath Sounds / Voice Sounds] : lungs were clear to auscultation bilaterally [Heart Rate And Rhythm] : heart rate was normal and rhythm regular [Heart Sounds] : normal S1 and S2 [Murmurs] : no murmurs [Abdomen Soft] : soft [Abdomen Tenderness] : non-tender [Abdomen Mass (___ Cm)] : no abdominal mass palpated [Oriented To Time, Place, And Person] : oriented to person, place, and time [Impaired Insight] : insight and judgment were intact [FreeTextEntry1] : Tearful during interview

## 2025-05-21 NOTE — HISTORY OF PRESENT ILLNESS
[Disease: _____________________] : Disease: [unfilled] [T: ___] : T[unfilled] [N: ___] : N[unfilled] [M: ___] : M[unfilled] [AJCC Stage: ____] : AJCC Stage: [unfilled] [de-identified] : 62F former smoker (27py, quit ~17y ago) referred by Dr. Nieves after surveillance CT scans showing interval growth of CHAPARRITA lesion with biopsy showing lung adenocarcinoma status post resection by Dr. Garland, final pathology with positive margins and mediastinal invasion, here for follow up.  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 Nieves for her COPD/atopic asthma. Given her smoking history, she also has been under surveillance for a lung nodule that has increased in size.    CT Chest 24 Left upper lobe 0.8 cm subsolid nodule contiguous with and adjacent to an emphysematous cystic air space medially, which demonstrates irregular nodular wall thickening as described above. The exact etiology is unclear, but one differential diagnostic consideration is for primary lung neoplasm. Recommend correlation with PET/CT. No evidence of pulmonary fibrosis as clinically questioned.  CT Chest 24 When compared to examination dated 2024 there has been interval development of a part solid nodule measuring 29 x 28 mm in transverse by AP dimensions (9:67) within the apical posterior segment of the left upper lobe. The solid component has increased in size measuring 26 x 12 mm as above. Further imaging to include whole-body PET/CT is suggested to exclude developing adenocarcinoma. Substantial paraseptal and moderate centrilobular emphysema again noted. Multiple stable subcentimeter as well as solid and calcified micronodules as described above.  PET-CT 24 Abnormal skull-to-thigh FDG-PET/CT scan. 1. An intensely FDG avid irregular solid nodule in the paramediastinal left lung apex, compatible with lung malignancy. No hypermetabolic hilar or mediastinal nodes. 2. New superior endplate deformities in T12 and L2 with increased activity may reflect recent trauma; please correlate clinically. 3. No suspicious FDG avid malignant tissue in the remaining field of view.  Lung, CHAPARRITA FNA Pathology 10/24/24 POSITIVE FOR MALIGNANT CELLS. Non- small cell carcinoma, favor adenocarcinoma - see Note. The cell block shows scant similar findings.  EBUS 24 ALL ln NEGATIVE  MRI Brain 24 - No evidence of intracranial metastasis.  12/10/2024: Pathology:  1.  Lung, left upper lobe with mediastinal pleura, resection: -   Poorly differentiated adenocarcinoma, solid (90%) and acinar (10%) patterns -   1.2 cm greatest dimension -   Resection edge negative for adenocarcinoma -   Fibrous adhesions with infiltrating adenocarcinoma present, see note -   Negative for lymphovascular invasion -   Negative for REGLA -   Emphysematous changes of surrounding lung -   See synoptic report below  Note: There is extensive fibrosis and inflammation surrounding the tumor. IHC workup reveals the malignant cells are positive for TTF-1.  They are negative for Napsin and p40.  Per Dr. MAURA Garland, the tumor was adhered to the mediastinal pleura.  Fragments of fibrous adhesions containing infiltrating adenocarcinoma are present in the specimen container (grossly described as free-floating tissue).  Because adenocarcinoma is present in the adhesions, this lesion will be staged as invading the mediastinal region of the parietal  pleura (pT3).  This case is discussed with Dr. Garland on 2024.  A portion the specimen will be sent for PD-L1 IHC and molecular testing.  2.  Lung, left upper lobe, additional margin excision: -   Negative for carcinoma -   Emphysematous change and subpleural bleb 3.  Lymph node, left level 9, excision: 1 lymph node negative for metastatic carcinoma, 0/1 4.  Lymph node, level 7, excision: 1 lymph node negative for metastatic carcinoma, 0/1 5.  Lymph node, left level 10, excision: 3 lymph nodes negative for metastatic carcinoma, 0/3 6.  Lymph node, left level 5, excision: Multiple fragments of lymph node(s) negative for metastatic carcinoma  Verified by: Rickey Cordero M.D. Reported on: 24 16:08 [facility], [facility], [address] [place] [address], _________________________________________________________________  Comment mol 1A  Synoptic Summary 1: Lung - Resection Procedure:  Wedge resection Specimen Laterality:   Left Tumor Focality:   Single focus Tumor Site:  Upper lobe of lung Total Tumor Size:   1.2 Centimeters (cm) Size of Invasive Component:   1.2 Centimeters (cm) Histologic Type:   Invasive solid adenocarcinoma Histologic Patterns Present:   Acinar - 10%;  Solid - 90% Histologic Grade:   G3, poorly differentiated Spread Through Air Spaces (REGLA):    Not identified Visceral Pleura Invasion:   Present Direct Invasion of Adjacent Structures:    Present Involved Adjacent Structures:   Mediastinal region of parietal pleura Treatment Effect:   No known presurgical therapy Lymphovascular Invasion:   Not identified Margin Status for Invasive Carcinoma:    All margins negative for invasive carcinoma Closest Margin(s) to Invasive Carcinoma:    Parenchymal Distance from Invasive Carcinoma to Closest Margin:    Greater than 0.8 cm Margin Status for Non-Invasive Tumor:    Not applicable Lymph Node(s) from Prior Procedures:    No known prior lymph node sampling performed Regional Lymph Node Status:   All regional lymph nodes negative for tumor Number of Lymph Nodes Examined:   At least 6 Amira Site(s) Examined:   7: Subcarinal;  5: Subaortic / aortopulmonary (AP) / AP window;  9L: Pulmonary ligament; 10L: Hilar Pathologic Stage Classification (pTNM, AJCC 8th Edition) pT Category:   pT4 pN Category:   pN0 Best Tumor Blocks for Future Studies Tumor Block(s):   1A  25: TTB - - Surgical pathology was reviewed, pleural margin is positive.   25: CT Chest:  1. Enlarging left superior mediastinal lesion with probable central necrosis measuring 25 x 25 mm (it measured 16 x 15 mm on whole-body PET/CT dated 2024. This may represent mediastinal tumor progression with central necrosis versus postsurgical changes/abscess. Clinical and laboratory correlation and follow-up whole-body PET/CT may be of value. 2. Interval postsurgical changes with chain sutures in the apical posterior segment of the left upper lobe in the paramediastinal region with increasing masslike consolidation in this region measuring 4 x 19 mm. Abbreviated differential includes tumor progression, postsurgical/radiation changes. Whole-body PET/CT correlation and short interval surveillance is suggested. 3. Additional stable findings as described above.  The patient completed CCRT with carbo/taxol 3/18/2025. PET/CT 25 to assess response revealed:   1. In the previously noted area of necrosis at the superior mediastinum, there is focal uptake at an ill-defined soft tissue density, SUV max 5.2, indeterminate in nature. 2. Suture material at the paramedian left lung apex is surrounded by an FDG avid opacity, SUV max 5.0. Favor postradiation changes, although residual disease cannot be excluded. 3. New focal uptake at vertebral body T2, possibly representing an osseous metastasis. Further evaluation with MRI may be of benefit. 4. Increased uptake along the right tongue extending to the base, probably inflammatory in nature. Correlate clinically. FDG avid left level 2A and bilateral level 2B nodes, probably related to oral pathology. 5. Focal uptake below the bladder, possibly representing a urethral diverticulum or urethritis. Correlate clinically. Additional smaller focus of activity at the posterior left labia majora, possibly urinary contamination, although an infected/inflammatory Bartholin gland cyst can have a similar appearance. Direct visualization is recommended to exclude a skin lesion.  MR  confirmed T2 metastatic lesion.   [de-identified] : Adenocarcinoma - PDL1 40-50%, KRAS G12C [de-identified] : Positive margins, invasion of parietal pleura [de-identified] : Pulm: Dr. Nieves CTSx: Dr. Garland [FreeTextEntry1] : . 2/4/25 = C1D1 carbo/taxol 2/11/25 = C1D8 carbo/taxol + RT [de-identified] : Doing well on treatment so far with no reported  AEs. Energy levels are good, appetite is maintained

## 2025-05-21 NOTE — ASSESSMENT
[FreeTextEntry1] : 63 yo F with smoking history, who quit 17 yrs ago here for follow up of KRAS G12C, PDL1 40-50%, NSCLC, stage IIIA, s/p wedge plus MLND on 12/10/2024 with Dr. Garland, now on adjuvant CCRT for + margin disease.   # NSCLC - reurrent/metastatic  All lymph node samples during resection were negative for carcinoma, however there was direct invasion of mediastinum and pleura, which makes the patient T4, not T3, and also pathologically margins were positive and not negative as initially stated, therefore treatment plan has changed after multidisciplinary tumor board discussion for postop concurrent chemoradiation therapy.  She completed tx 3./18/25. Unfortunately interval scans with T2 metastatic disease.   I recommended treatment with single agent immunotherapy given recent exposure to chemotherapy and PD-L1 score 40-50%. Will reserve KRAS directed treatment to subsequent line. This was discussed with patient and she agreed.   C1D1 Apr 30th  So far tolerating treatment well without reported side effects.   --Cleared for C2 --RTC 3 weeks for C3 --Response assessment scans will be planned for end of July    The patient is on immunotherapy requiring frequent and intensive monitoring for immune related adverse events. This includes CBC. TSH, CMP, and thorough history and directed examination

## 2025-05-21 NOTE — PHYSICAL EXAM
[Restricted in physically strenuous activity but ambulatory and able to carry out work of a light or sedentary nature] : Status 1- Restricted in physically strenuous activity but ambulatory and able to carry out work of a light or sedentary nature, e.g., light house work, office work [Normal] : affect appropriate [de-identified] : EOMI, no conjunctival injection [de-identified] : supple [de-identified] : Left upper back area of hyperpigmentation c/w radiation induced dermatitis

## 2025-05-21 NOTE — PHYSICAL EXAM
[Restricted in physically strenuous activity but ambulatory and able to carry out work of a light or sedentary nature] : Status 1- Restricted in physically strenuous activity but ambulatory and able to carry out work of a light or sedentary nature, e.g., light house work, office work [Normal] : affect appropriate [de-identified] : EOMI, no conjunctival injection [de-identified] : supple [de-identified] : Left upper back area of hyperpigmentation c/w radiation induced dermatitis

## 2025-05-21 NOTE — HISTORY OF PRESENT ILLNESS
[Disease: _____________________] : Disease: [unfilled] [T: ___] : T[unfilled] [N: ___] : N[unfilled] [M: ___] : M[unfilled] [AJCC Stage: ____] : AJCC Stage: [unfilled] [de-identified] : 62F former smoker (27py, quit ~17y ago) referred by Dr. Nieves after surveillance CT scans showing interval growth of CHAPARRITA lesion with biopsy showing lung adenocarcinoma status post resection by Dr. Garland, final pathology with positive margins and mediastinal invasion, here for follow up.  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 Nieves for her COPD/atopic asthma. Given her smoking history, she also has been under surveillance for a lung nodule that has increased in size.    CT Chest 24 Left upper lobe 0.8 cm subsolid nodule contiguous with and adjacent to an emphysematous cystic air space medially, which demonstrates irregular nodular wall thickening as described above. The exact etiology is unclear, but one differential diagnostic consideration is for primary lung neoplasm. Recommend correlation with PET/CT. No evidence of pulmonary fibrosis as clinically questioned.  CT Chest 24 When compared to examination dated 2024 there has been interval development of a part solid nodule measuring 29 x 28 mm in transverse by AP dimensions (9:67) within the apical posterior segment of the left upper lobe. The solid component has increased in size measuring 26 x 12 mm as above. Further imaging to include whole-body PET/CT is suggested to exclude developing adenocarcinoma. Substantial paraseptal and moderate centrilobular emphysema again noted. Multiple stable subcentimeter as well as solid and calcified micronodules as described above.  PET-CT 24 Abnormal skull-to-thigh FDG-PET/CT scan. 1. An intensely FDG avid irregular solid nodule in the paramediastinal left lung apex, compatible with lung malignancy. No hypermetabolic hilar or mediastinal nodes. 2. New superior endplate deformities in T12 and L2 with increased activity may reflect recent trauma; please correlate clinically. 3. No suspicious FDG avid malignant tissue in the remaining field of view.  Lung, CHAPARRITA FNA Pathology 10/24/24 POSITIVE FOR MALIGNANT CELLS. Non- small cell carcinoma, favor adenocarcinoma - see Note. The cell block shows scant similar findings.  EBUS 24 ALL ln NEGATIVE  MRI Brain 24 - No evidence of intracranial metastasis.  12/10/2024: Pathology:  1.  Lung, left upper lobe with mediastinal pleura, resection: -   Poorly differentiated adenocarcinoma, solid (90%) and acinar (10%) patterns -   1.2 cm greatest dimension -   Resection edge negative for adenocarcinoma -   Fibrous adhesions with infiltrating adenocarcinoma present, see note -   Negative for lymphovascular invasion -   Negative for REGLA -   Emphysematous changes of surrounding lung -   See synoptic report below  Note: There is extensive fibrosis and inflammation surrounding the tumor. IHC workup reveals the malignant cells are positive for TTF-1.  They are negative for Napsin and p40.  Per Dr. MAURA Garland, the tumor was adhered to the mediastinal pleura.  Fragments of fibrous adhesions containing infiltrating adenocarcinoma are present in the specimen container (grossly described as free-floating tissue).  Because adenocarcinoma is present in the adhesions, this lesion will be staged as invading the mediastinal region of the parietal  pleura (pT3).  This case is discussed with Dr. Garland on 2024.  A portion the specimen will be sent for PD-L1 IHC and molecular testing.  2.  Lung, left upper lobe, additional margin excision: -   Negative for carcinoma -   Emphysematous change and subpleural bleb 3.  Lymph node, left level 9, excision: 1 lymph node negative for metastatic carcinoma, 0/1 4.  Lymph node, level 7, excision: 1 lymph node negative for metastatic carcinoma, 0/1 5.  Lymph node, left level 10, excision: 3 lymph nodes negative for metastatic carcinoma, 0/3 6.  Lymph node, left level 5, excision: Multiple fragments of lymph node(s) negative for metastatic carcinoma  Verified by: Rickey Cordero M.D. Reported on: 24 16:08 [facility], [facility], [address] [place] [address], _________________________________________________________________  Comment mol 1A  Synoptic Summary 1: Lung - Resection Procedure:  Wedge resection Specimen Laterality:   Left Tumor Focality:   Single focus Tumor Site:  Upper lobe of lung Total Tumor Size:   1.2 Centimeters (cm) Size of Invasive Component:   1.2 Centimeters (cm) Histologic Type:   Invasive solid adenocarcinoma Histologic Patterns Present:   Acinar - 10%;  Solid - 90% Histologic Grade:   G3, poorly differentiated Spread Through Air Spaces (REGLA):    Not identified Visceral Pleura Invasion:   Present Direct Invasion of Adjacent Structures:    Present Involved Adjacent Structures:   Mediastinal region of parietal pleura Treatment Effect:   No known presurgical therapy Lymphovascular Invasion:   Not identified Margin Status for Invasive Carcinoma:    All margins negative for invasive carcinoma Closest Margin(s) to Invasive Carcinoma:    Parenchymal Distance from Invasive Carcinoma to Closest Margin:    Greater than 0.8 cm Margin Status for Non-Invasive Tumor:    Not applicable Lymph Node(s) from Prior Procedures:    No known prior lymph node sampling performed Regional Lymph Node Status:   All regional lymph nodes negative for tumor Number of Lymph Nodes Examined:   At least 6 Amira Site(s) Examined:   7: Subcarinal;  5: Subaortic / aortopulmonary (AP) / AP window;  9L: Pulmonary ligament; 10L: Hilar Pathologic Stage Classification (pTNM, AJCC 8th Edition) pT Category:   pT4 pN Category:   pN0 Best Tumor Blocks for Future Studies Tumor Block(s):   1A  25: TTB - - Surgical pathology was reviewed, pleural margin is positive.   25: CT Chest:  1. Enlarging left superior mediastinal lesion with probable central necrosis measuring 25 x 25 mm (it measured 16 x 15 mm on whole-body PET/CT dated 2024. This may represent mediastinal tumor progression with central necrosis versus postsurgical changes/abscess. Clinical and laboratory correlation and follow-up whole-body PET/CT may be of value. 2. Interval postsurgical changes with chain sutures in the apical posterior segment of the left upper lobe in the paramediastinal region with increasing masslike consolidation in this region measuring 4 x 19 mm. Abbreviated differential includes tumor progression, postsurgical/radiation changes. Whole-body PET/CT correlation and short interval surveillance is suggested. 3. Additional stable findings as described above.  The patient completed CCRT with carbo/taxol 3/18/2025. PET/CT 25 to assess response revealed:   1. In the previously noted area of necrosis at the superior mediastinum, there is focal uptake at an ill-defined soft tissue density, SUV max 5.2, indeterminate in nature. 2. Suture material at the paramedian left lung apex is surrounded by an FDG avid opacity, SUV max 5.0. Favor postradiation changes, although residual disease cannot be excluded. 3. New focal uptake at vertebral body T2, possibly representing an osseous metastasis. Further evaluation with MRI may be of benefit. 4. Increased uptake along the right tongue extending to the base, probably inflammatory in nature. Correlate clinically. FDG avid left level 2A and bilateral level 2B nodes, probably related to oral pathology. 5. Focal uptake below the bladder, possibly representing a urethral diverticulum or urethritis. Correlate clinically. Additional smaller focus of activity at the posterior left labia majora, possibly urinary contamination, although an infected/inflammatory Bartholin gland cyst can have a similar appearance. Direct visualization is recommended to exclude a skin lesion.  MR  confirmed T2 metastatic lesion.   [de-identified] : Adenocarcinoma - PDL1 40-50%, KRAS G12C [de-identified] : Positive margins, invasion of parietal pleura [de-identified] : Pulm: Dr. Nieves CTSx: Dr. Garland [FreeTextEntry1] : . 2/4/25 = C1D1 carbo/taxol 2/11/25 = C1D8 carbo/taxol + RT [de-identified] : Doing well on treatment so far with no reported  AEs. Energy levels are good, appetite is maintained

## 2025-06-10 NOTE — HISTORY OF PRESENT ILLNESS
[de-identified] : 62F former smoker (27py, quit ~17y ago) referred by Dr. Nieves after surveillance CT scans showing interval growth of CHAPARRITA lesion with biopsy showing lung adenocarcinoma status post resection by Dr. Garland, final pathology with positive margins and mediastinal invasion, here for follow up.  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 Nieves for her COPD/atopic asthma. Given her smoking history, she also has been under surveillance for a lung nodule that has increased in size.  CT Chest 24 Left upper lobe 0.8 cm subsolid nodule contiguous with and adjacent to an emphysematous cystic air space medially, which demonstrates irregular nodular wall thickening as described above. The exact etiology is unclear, but one differential diagnostic consideration is for primary lung neoplasm. Recommend correlation with PET/CT. No evidence of pulmonary fibrosis as clinically questioned.  CT Chest 24 When compared to examination dated 2024 there has been interval development of a part solid nodule measuring 29 x 28 mm in transverse by AP dimensions (9:67) within the apical posterior segment of the left upper lobe. The solid component has increased in size measuring 26 x 12 mm as above. Further imaging to include whole-body PET/CT is suggested to exclude developing adenocarcinoma. Substantial paraseptal and moderate centrilobular emphysema again noted. Multiple stable subcentimeter as well as solid and calcified micronodules as described above.  PET-CT 24 Abnormal skull-to-thigh FDG-PET/CT scan. 1. An intensely FDG avid irregular solid nodule in the paramediastinal left lung apex, compatible with lung malignancy. No hypermetabolic hilar or mediastinal nodes. 2. New superior endplate deformities in T12 and L2 with increased activity may reflect recent trauma; please correlate clinically. 3. No suspicious FDG avid malignant tissue in the remaining field of view.  Lung, CHAPARRITA FNA Pathology 10/24/24 POSITIVE FOR MALIGNANT CELLS. Non- small cell carcinoma, favor adenocarcinoma - see Note. The cell block shows scant similar findings.  EBUS 24 ALL ln NEGATIVE  MRI Brain 24 - No evidence of intracranial metastasis.  12/10/2024: Pathology:  1. Lung, left upper lobe with mediastinal pleura, resection: - Poorly differentiated adenocarcinoma, solid (90%) and acinar (10%) patterns - 1.2 cm greatest dimension - Resection edge negative for adenocarcinoma - Fibrous adhesions with infiltrating adenocarcinoma present, see note - Negative for lymphovascular invasion - Negative for REGLA - Emphysematous changes of surrounding lung - See synoptic report below  Note: There is extensive fibrosis and inflammation surrounding the tumor. IHC workup reveals the malignant cells are positive for TTF-1. They are negative for Napsin and p40. Per Dr. MAURA Garland, the tumor was adhered to the mediastinal pleura. Fragments of fibrous adhesions containing infiltrating adenocarcinoma are present in the specimen container (grossly described as free-floating tissue). Because adenocarcinoma is present in the adhesions, this lesion will be staged as invading the mediastinal region of the parietal pleura (pT3). This case is discussed with Dr. Garland on 2024.  A portion the specimen will be sent for PD-L1 IHC and molecular testing.  2. Lung, left upper lobe, additional margin excision: - Negative for carcinoma - Emphysematous change and subpleural bleb 3. Lymph node, left level 9, excision: 1 lymph node negative for metastatic carcinoma, 0/1 4. Lymph node, level 7, excision: 1 lymph node negative for metastatic carcinoma, 0/1 5. Lymph node, left level 10, excision: 3 lymph nodes negative for metastatic carcinoma, 0/3 6. Lymph node, left level 5, excision: Multiple fragments of lymph node(s) negative for metastatic carcinoma  Verified by: Rickey Cordero M.D. Reported on: 24 16:08 [facility], [facility], [address] [place] [address], _________________________________________________________________  Comment mol 1A  Synoptic Summary 1: Lung - Resection Procedure: Wedge resection Specimen Laterality: Left Tumor Focality: Single focus Tumor Site: Upper lobe of lung Total Tumor Size: 1.2 Centimeters (cm) Size of Invasive Component: 1.2 Centimeters (cm) Histologic Type: Invasive solid adenocarcinoma Histologic Patterns Present: Acinar - 10%; Solid - 90% Histologic Grade: G3, poorly differentiated Spread Through Air Spaces (REGLA): Not identified Visceral Pleura Invasion: Present Direct Invasion of Adjacent Structures: Present Involved Adjacent Structures: Mediastinal region of parietal pleura Treatment Effect: No known presurgical therapy Lymphovascular Invasion: Not identified Margin Status for Invasive Carcinoma: All margins negative for invasive carcinoma Closest Margin(s) to Invasive Carcinoma: Parenchymal Distance from Invasive Carcinoma to Closest Margin: Greater than 0.8 cm Margin Status for Non-Invasive Tumor: Not applicable Lymph Node(s) from Prior Procedures: No known prior lymph node sampling performed Regional Lymph Node Status: All regional lymph nodes negative for tumor Number of Lymph Nodes Examined: At least 6 Amira Site(s) Examined: 7: Subcarinal; 5: Subaortic / aortopulmonary (AP) / AP window; 9L: Pulmonary ligament; 10L: Hilar Pathologic Stage Classification (pTNM, AJCC 8th Edition) pT Category: pT4 pN Category: pN0 Best Tumor Blocks for Future Studies Tumor Block(s): 1A  25: TTB - - Surgical pathology was reviewed, pleural margin is positive.  25: CT Chest: 1. Enlarging left superior mediastinal lesion with probable central necrosis measuring 25 x 25 mm (it measured 16 x 15 mm on whole-body PET/CT dated 2024. This may represent mediastinal tumor progression with central necrosis versus postsurgical changes/abscess. Clinical and laboratory correlation and follow-up whole-body PET/CT may be of value. 2. Interval postsurgical changes with chain sutures in the apical posterior segment of the left upper lobe in the paramediastinal region with increasing masslike consolidation in this region measuring 4 x 19 mm. Abbreviated differential includes tumor progression, postsurgical/radiation changes. Whole-body PET/CT correlation and short interval surveillance is suggested. 3. Additional stable findings as described above.  The patient completed CCRT with carbo/taxol 3/18/2025. PET/CT 25 to assess response revealed:  1. In the previously noted area of necrosis at the superior mediastinum, there is focal uptake at an ill-defined soft tissue density, SUV max 5.2, indeterminate in nature. 2. Suture material at the paramedian left lung apex is surrounded by an FDG avid opacity, SUV max 5.0. Favor postradiation changes, although residual disease cannot be excluded. 3. New focal uptake at vertebral body T2, possibly representing an osseous metastasis. Further evaluation with MRI may be of benefit. 4. Increased uptake along the right tongue extending to the base, probably inflammatory in nature. Correlate clinically. FDG avid left level 2A and bilateral level 2B nodes, probably related to oral pathology. 5. Focal uptake below the bladder, possibly representing a urethral diverticulum or urethritis. Correlate clinically. Additional smaller focus of activity at the posterior left labia majora, possibly urinary contamination, although an infected/inflammatory Bartholin gland cyst can have a similar appearance. Direct visualization is recommended to exclude a skin lesion.  MR  confirmed T2 metastatic lesion.   Disease: NSCLC, adenocarcinoma   Pathology: Adenocarcinoma - PDL1 40-50%, KRAS G12C   TNM stage: T4, N0, M0 AJCC Stage: IIIA   Tumor Markers: Positive margins, invasion of parietal pleura   Pulm: Dr. Nieves CTSx: Dr. Garland    Current Treatment Status:. . 25 = C1D1 carbo/taxol 25 = C1D8 carbo/taxol + RT.   [de-identified] :   Interval History: Doing well on treatment so far with no reported AEs. Energy levels are good, appetite is maintained.

## 2025-06-10 NOTE — ASSESSMENT
[FreeTextEntry1] : Adenocarcinoma of left lung (162.9) (C34.92)  61 yo F with smoking history, who quit 17 yrs ago here for follow up of KRAS G12C, PDL1 40-50%, NSCLC, stage IIIA, s/p wedge plus MLND on 12/10/2024 with Dr. Garland, now on adjuvant CCRT for + margin disease.  # NSCLC - reurrent/metastatic All lymph node samples during resection were negative for carcinoma, however there was direct invasion of mediastinum and pleura, which makes the patient T4, not T3, and also pathologically margins were positive and not negative as initially stated, therefore treatment plan has changed after multidisciplinary tumor board discussion for postop concurrent chemoradiation therapy.  She completed tx 3./18/25. Unfortunately interval scans with T2 metastatic disease.  I recommended treatment with single agent immunotherapy given recent exposure to chemotherapy and PD-L1 score 40-50%. Will reserve KRAS directed treatment to subsequent line. This was discussed with patient and she agreed.  C1D1 Apr 30th  So far tolerating treatment well without reported side effects.  --Cleared for C3 --RTC 3 wks C4  --Response assessment scans will be planned for end of July   The patient is on immunotherapy requiring frequent and intensive monitoring for immune related adverse events. This includes CBC. TSH, CMP, and thorough history and directed examination.

## 2025-06-11 NOTE — ASSESSMENT
[FreeTextEntry1] : - 62F with lung adenocarcinoma (with invasion of parietal pleura), COPD, DM2, RA (chronic steroids), anxiety and depression here for palliative care support.  #Leg pain - Oxycodone 15mg PO q8h PRN - Bowel regimen while on opioids - Follow-up MRI - F/u with Dr. Black - Narcan intranasal spray sent to pharmacy previously - Gabapentin 600mg PO BID,  with oxycodone so not to be too sedating. Counseled to try gabapentin 300mg to avoid oversedation.  #Poor appetite - Dronabinol 10mg PO BID  #Anxiety/depression - Referred to Dr. Banerjee of psycho-onc  #ACP See [facility] note from 4/14/25 for details - HCP: She Guillaume (daughter, [phone]); alternate HCP: Richyolie Guillaume (son, [phone]) - Full code  F/u in 2 weeks

## 2025-06-11 NOTE — ASSESSMENT
[FreeTextEntry1] : - 62F with lung adenocarcinoma (with invasion of parietal pleura), COPD, DM2, RA (chronic steroids), anxiety and depression here for palliative care support.  #Leg pain - Oxycodone 15mg PO q8h PRN - Bowel regimen while on opioids - Follow-up MRI - F/u with Dr. Black - Narcan intranasal spray sent to pharmacy previously - Gabapentin 600mg PO BID,  with oxycodone so not to be too sedating. Counseled to try gabapentin 300mg to avoid oversedation.  #Poor appetite - Dronabinol 10mg PO BID  #Anxiety/depression - Referred to Dr. Banerjee of psycho-onc  #ACP See [facility] note from 4/14/25 for details - HCP: She Guillaume (daughter, [phone]); alternate HCP: Richyolie Guillaume (son, [phone]) - Full code  F/u in 2 weeks

## 2025-06-11 NOTE — HISTORY OF PRESENT ILLNESS
[FreeTextEntry1] : 63 yo F with lung adenocarcinoma (with invasion of parietal pleura), COPD, DM2, RA (chronic steroids), anxiety and depression here for palliative care support.  Onc Hx: She has been seeing Dr. Nieves for her COPD/atopic asthma. Given her smoking history, she also has been under surveillance for a lung nodule that has increased in size. 8/23/24 - CT Chest - When compared to examination dated 5/30/2024 there has been interval development of a part solid nodule measuring 29 x 28 mm 9/25/24 - PET-CT Abnormal skull-to-thigh FDG-PET/CT scan. 1. An intensely FDG avid irregular solid nodule in the paramediastinal left lung apex, compatible with lung malignancy. No hypermetabolic hilar or mediastinal nodes. 2. New superior endplate deformities in T12 and L2 with increased activity may reflect recent trauma; please correlate clinically. 3. No suspicious FDG avid malignant tissue in the remaining field of view.  12/10/2024: Pathology: Poorly differentiated adenocarcinoma, solid (90%) and acinar (10%) patterns with lymph node involvement Lung - L Wedge resection. Total Tumor Size: 1.2 Centimeters (cm). Invasive solid adenocarcinoma, poorly differentiated   Providers: Oncologist - Oren Mercado Onc - Gabriella Wernicke Pulwaylon - Dr. Nieves CTSx - Dr. Garland Current Treatment Status: Carbo/taxol + RT (completed RT4/2025)  Interval Hx: Lost additional 3 pounds despite increasing dronabinol to 2 tablets at a time, once a day.  SYMPTOMS: #Pain Location - R upper leg Quality - numbness, cold Radiation - Up from knee to hip Timing - Constant Aggravating factors - None Minimal acceptable level (0-10 scale) - unable to quantify Severity in last 24h (0-10 scale) - 5/10 Current score (0-10 scale) - 5/10 Takes gabapentin 600mg BID and oxycodone 15mg PRN (taking on average 2-3 times a day). Takes the oxycodone at the start of the day which helps keep pain from becoming severe. Movement often makes pain better but it seems at its worst at the beginning of the day. ISTOP Ref #: [id number]  #N/V Zofran and olanzapine PRN.  #Poor appetite Started taking Dronabinol 5mg daily with improvement in appetite but does not feel it is helping as much as initially. Lost additional 3 pounds.  #Anxiety/Depression Long h/o anxiety and depression. Takes seroquel at night for anxiety but has not been seeing a psychiatrist for some time. Does have an appointment with psycho-onc for therapy scheduled for tomorrow.   Advanced Directives: - HCP: She Guillaume (daughter, [phone]); alternate HCP: Rich Guillaume (son, [phone]) - Full code (last discussed 4/14/25)   SH: Lives with partner of 44 years, Gavin, not . Has a daughter She and son Rich. 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   ROS: If [ ] blank, symptom not present Fatigue [ ] Nausea [ ] Loss of appetite [ ] Unintentional weight loss [ ] Constipation [ ] Diarrhea [ ] Anxiety [ ] Low mood [ ] Other symptoms: [x ] All other review of symptoms negative

## 2025-06-11 NOTE — HISTORY OF PRESENT ILLNESS
[FreeTextEntry1] : 63 yo F with lung adenocarcinoma (with invasion of parietal pleura), COPD, DM2, RA (chronic steroids), anxiety and depression here for palliative care support.  Onc Hx: She has been seeing Dr. Nieves for her COPD/atopic asthma. Given her smoking history, she also has been under surveillance for a lung nodule that has increased in size. 8/23/24 - CT Chest - When compared to examination dated 5/30/2024 there has been interval development of a part solid nodule measuring 29 x 28 mm 9/25/24 - PET-CT Abnormal skull-to-thigh FDG-PET/CT scan. 1. An intensely FDG avid irregular solid nodule in the paramediastinal left lung apex, compatible with lung malignancy. No hypermetabolic hilar or mediastinal nodes. 2. New superior endplate deformities in T12 and L2 with increased activity may reflect recent trauma; please correlate clinically. 3. No suspicious FDG avid malignant tissue in the remaining field of view.  12/10/2024: Pathology: Poorly differentiated adenocarcinoma, solid (90%) and acinar (10%) patterns with lymph node involvement Lung - L Wedge resection. Total Tumor Size: 1.2 Centimeters (cm). Invasive solid adenocarcinoma, poorly differentiated   Providers: Oncologist - Oren Mercado Onc - Gabriella Wernicke Pulwaylon - Dr. Nieves CTSx - Dr. Garland Current Treatment Status: Carbo/taxol + RT (completed RT4/2025)  Interval Hx: Lost additional 3 pounds despite increasing dronabinol to 2 tablets at a time, once a day.  SYMPTOMS: #Pain Location - R upper leg Quality - numbness, cold Radiation - Up from knee to hip Timing - Constant Aggravating factors - None Minimal acceptable level (0-10 scale) - unable to quantify Severity in last 24h (0-10 scale) - 5/10 Current score (0-10 scale) - 5/10 Takes gabapentin 600mg BID and oxycodone 15mg PRN (taking on average 2-3 times a day). Takes the oxycodone at the start of the day which helps keep pain from becoming severe. Movement often makes pain better but it seems at its worst at the beginning of the day. ISTOP Ref #: [id number]  #N/V Zofran and olanzapine PRN.  #Poor appetite Started taking Dronabinol 5mg daily with improvement in appetite but does not feel it is helping as much as initially. Lost additional 3 pounds.  #Anxiety/Depression Long h/o anxiety and depression. Takes seroquel at night for anxiety but has not been seeing a psychiatrist for some time. Does have an appointment with psycho-onc for therapy scheduled for tomorrow.   Advanced Directives: - HCP: She Guillaume (daughter, [phone]); alternate HCP: Rich Guillaume (son, [phone]) - Full code (last discussed 4/14/25)   SH: Lives with partner of 44 years, Gavin, not . Has a daughter She and son Rich. 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   ROS: If [ ] blank, symptom not present Fatigue [ ] Nausea [ ] Loss of appetite [ ] Unintentional weight loss [ ] Constipation [ ] Diarrhea [ ] Anxiety [ ] Low mood [ ] Other symptoms: [x ] All other review of symptoms negative

## 2025-06-27 NOTE — HISTORY OF PRESENT ILLNESS
[Home] : at home, [unfilled] , at the time of the visit. [Other Location: e.g. Home (Enter Location, City,State)___] : at [unfilled] [Verbal consent obtained from patient] : the patient, [unfilled] [Telephone (audio)] : This telephonic visit was provided via audio only technology. [Technical] : patient unable to effectively utilize tele-video due to technical issues. [FreeTextEntry1] : - 61 yo F with lung adenocarcinoma (with invasion of parietal pleura), COPD, DM2, RA (chronic steroids), anxiety and depression here for palliative care support. Accompanied by daughter She.  Onc Hx: She has been seeing Dr. Nieves for her COPD/atopic asthma. Given her smoking history, she also has been under surveillance for a lung nodule that has increased in size. 8/23/24 - CT Chest - When compared to examination dated 5/30/2024 there has been interval development of a part solid nodule measuring 29 x 28 mm 9/25/24 - PET-CT Abnormal skull-to-thigh FDG-PET/CT scan. 1. An intensely FDG avid irregular solid nodule in the paramediastinal left lung apex, compatible with lung malignancy. No hypermetabolic hilar or mediastinal nodes. 2. New superior endplate deformities in T12 and L2 with increased activity may reflect recent trauma; please correlate clinically. 3. No suspicious FDG avid malignant tissue in the remaining field of view.  12/10/2024: Pathology: Poorly differentiated adenocarcinoma, solid (90%) and acinar (10%) patterns with lymph node involvement Lung - L Wedge resection. Total Tumor Size: 1.2 Centimeters (cm). Invasive solid adenocarcinoma, poorly differentiated   Providers: Oncologist - Oren Marcial - Gabriella Wernicke Pulm - Dr. Nieves CTSx - Dr. Garland [facility] - Jackelyn Black  Current Treatment: Pembrolizumab x2lmngq Previous Treatment: Carbo/taxol + RT (completed RT 4/2025)  Interval Hx: Extremely upset today "with how I am being treated" by providers and staff of the cancer institute. Feels that her pain is not being taken seriously and that no answers have been given. Also feels that there has been rudeness in dealing with her and a lack of close follow-up. Underwent MRI lumbar spine which showed stable L2 compression fracture (compared to 8/2024) and L4-5 b/l neural foraminal narrowing. Also had hip MRI showing some mild tendinosis and small focus of chronic avascular necrosis. Has yet to follow-up with Dr. Black following MRI.  SYMPTOMS: #Pain Location - R upper leg Quality - numbness, cold Radiation - Up from knee to hip Timing - Constant Aggravating factors - None Minimal acceptable level (0-10 scale) - unable to quantify Severity in last 24h (0-10 scale) - 5/10 Current score (0-10 scale) - 5/10 Takes gabapentin 600mg BID and oxycodone 15mg PRN. Endorses that these are all sedating medications and that she is not sure the gabapentin is helping at this dose. ISTOP Ref #: [id number]  #N/V Zofran and olanzapine PRN.  #Poor appetite Started taking Dronabinol 10mg daily with improvement in appetite. Weight stable.  #Anxiety/Depression Long h/o anxiety and depression. Takes seroquel at night for anxiety but has not been seeing a psychiatrist for some time. Saw psycho-onc but has not had follow-up in a 2-3 weeks   Advanced Directives: - HCP: She Guillaume (daughter, [phone]); alternate HCP: Rich Guillaume (son, [phone]) - Full code (last discussed 4/14/25)   SH: Lives with partner of 44 years, Gavin, not . Has a daughter She and son Rich. 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   ROS: If [ ] blank, symptom not present Fatigue [ ] Nausea [ ] Loss of appetite [ ] Unintentional weight loss [ ] Constipation [ ] Diarrhea [ ] Anxiety [ ] Low mood [ ] Other symptoms: [x ] All other review of symptoms negative

## 2025-06-27 NOTE — ASSESSMENT
[FreeTextEntry1] : - 62F with lung adenocarcinoma (with invasion of parietal pleura), COPD, DM2, RA (chronic steroids), anxiety and depression here for palliative care support.  #Leg pain - Oxycodone 15mg PO q8h PRN - Bowel regimen while on opioids - F/u with Dr. Black. I will send her a message to reach out to patient - Narcan intranasal spray sent to pharmacy previously - Will titrate off Gabapentin due to ineffectiveness and oversedation. Start with 300mg PO BID x1 week then stop.  #Poor appetite - Dronabinol 10mg PO BID  #Anxiety/depression - F/u with Dr. Banerjee of psycho-onc  #ACP See [facility] note from 4/14/25 for details - HCP: She Guillaume (daughter, [phone]); alternate HCP: Richyolie Guillaume (son, [phone]) - Full code  F/u in 1 week

## 2025-06-27 NOTE — ASSESSMENT
[FreeTextEntry1] : - 62F with lung adenocarcinoma (with invasion of parietal pleura), COPD, DM2, RA (chronic steroids), anxiety and depression here for palliative care support.  #Leg pain - Oxycodone 15mg PO q8h PRN - Bowel regimen while on opioids - F/u with Dr. Black. I will send her a message to reach out to patient - Narcan intranasal spray sent to pharmacy previously - Will titrate off Gabapentin due to ineffectiveness and oversedation. Start with 300mg PO BID x1 week then stop.  #Poor appetite - Dronabinol 10mg PO BID  #Anxiety/depression - F/u with Dr. Banerjee of psycho-onc  #ACP See [facility] note from 4/14/25 for details - HCP: She Guillaume (daughter, [phone]); alternate HCP: Richyolie Guillaume (son, [phone]) - Full code  F/u in 1 week

## 2025-06-27 NOTE — HISTORY OF PRESENT ILLNESS
[Home] : at home, [unfilled] , at the time of the visit. [Other Location: e.g. Home (Enter Location, City,State)___] : at [unfilled] [Verbal consent obtained from patient] : the patient, [unfilled] [Telephone (audio)] : This telephonic visit was provided via audio only technology. [Technical] : patient unable to effectively utilize tele-video due to technical issues. [FreeTextEntry1] : - 61 yo F with lung adenocarcinoma (with invasion of parietal pleura), COPD, DM2, RA (chronic steroids), anxiety and depression here for palliative care support. Accompanied by daughter She.  Onc Hx: She has been seeing Dr. Nieves for her COPD/atopic asthma. Given her smoking history, she also has been under surveillance for a lung nodule that has increased in size. 8/23/24 - CT Chest - When compared to examination dated 5/30/2024 there has been interval development of a part solid nodule measuring 29 x 28 mm 9/25/24 - PET-CT Abnormal skull-to-thigh FDG-PET/CT scan. 1. An intensely FDG avid irregular solid nodule in the paramediastinal left lung apex, compatible with lung malignancy. No hypermetabolic hilar or mediastinal nodes. 2. New superior endplate deformities in T12 and L2 with increased activity may reflect recent trauma; please correlate clinically. 3. No suspicious FDG avid malignant tissue in the remaining field of view.  12/10/2024: Pathology: Poorly differentiated adenocarcinoma, solid (90%) and acinar (10%) patterns with lymph node involvement Lung - L Wedge resection. Total Tumor Size: 1.2 Centimeters (cm). Invasive solid adenocarcinoma, poorly differentiated   Providers: Oncologist - Oren Marcial - Gabriella Wernicke Pulm - Dr. Nieves CTSx - Dr. Garland [facility] - Jackelyn Black  Current Treatment: Pembrolizumab e2pafkg Previous Treatment: Carbo/taxol + RT (completed RT 4/2025)  Interval Hx: Extremely upset today "with how I am being treated" by providers and staff of the cancer institute. Feels that her pain is not being taken seriously and that no answers have been given. Also feels that there has been rudeness in dealing with her and a lack of close follow-up. Underwent MRI lumbar spine which showed stable L2 compression fracture (compared to 8/2024) and L4-5 b/l neural foraminal narrowing. Also had hip MRI showing some mild tendinosis and small focus of chronic avascular necrosis. Has yet to follow-up with Dr. Black following MRI.  SYMPTOMS: #Pain Location - R upper leg Quality - numbness, cold Radiation - Up from knee to hip Timing - Constant Aggravating factors - None Minimal acceptable level (0-10 scale) - unable to quantify Severity in last 24h (0-10 scale) - 5/10 Current score (0-10 scale) - 5/10 Takes gabapentin 600mg BID and oxycodone 15mg PRN. Endorses that these are all sedating medications and that she is not sure the gabapentin is helping at this dose. ISTOP Ref #: [id number]  #N/V Zofran and olanzapine PRN.  #Poor appetite Started taking Dronabinol 10mg daily with improvement in appetite. Weight stable.  #Anxiety/Depression Long h/o anxiety and depression. Takes seroquel at night for anxiety but has not been seeing a psychiatrist for some time. Saw psycho-onc but has not had follow-up in a 2-3 weeks   Advanced Directives: - HCP: She Guillaume (daughter, [phone]); alternate HCP: Rich Guillaume (son, [phone]) - Full code (last discussed 4/14/25)   SH: Lives with partner of 44 years, Gavin, not . Has a daughter She and son Rich. 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   ROS: If [ ] blank, symptom not present Fatigue [ ] Nausea [ ] Loss of appetite [ ] Unintentional weight loss [ ] Constipation [ ] Diarrhea [ ] Anxiety [ ] Low mood [ ] Other symptoms: [x ] All other review of symptoms negative

## 2025-07-01 NOTE — HISTORY OF PRESENT ILLNESS
[de-identified] : 62F former smoker (27py, quit ~17y ago) referred by Dr. Nieves after surveillance CT scans showing interval growth of CHAPARRITA lesion with biopsy showing lung adenocarcinoma status post resection by Dr. Garland, final pathology with positive margins and mediastinal invasion, here for follow up.  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 Nieves for her COPD/atopic asthma. Given her smoking history, she also has been under surveillance for a lung nodule that has increased in size.  CT Chest 24 Left upper lobe 0.8 cm subsolid nodule contiguous with and adjacent to an emphysematous cystic air space medially, which demonstrates irregular nodular wall thickening as described above. The exact etiology is unclear, but one differential diagnostic consideration is for primary lung neoplasm. Recommend correlation with PET/CT. No evidence of pulmonary fibrosis as clinically questioned.  CT Chest 24 When compared to examination dated 2024 there has been interval development of a part solid nodule measuring 29 x 28 mm in transverse by AP dimensions (9:67) within the apical posterior segment of the left upper lobe. The solid component has increased in size measuring 26 x 12 mm as above. Further imaging to include whole-body PET/CT is suggested to exclude developing adenocarcinoma. Substantial paraseptal and moderate centrilobular emphysema again noted. Multiple stable subcentimeter as well as solid and calcified micronodules as described above.  PET-CT 24 Abnormal skull-to-thigh FDG-PET/CT scan. 1. An intensely FDG avid irregular solid nodule in the paramediastinal left lung apex, compatible with lung malignancy. No hypermetabolic hilar or mediastinal nodes. 2. New superior endplate deformities in T12 and L2 with increased activity may reflect recent trauma; please correlate clinically. 3. No suspicious FDG avid malignant tissue in the remaining field of view.  Lung, CHAPARRITA FNA Pathology 10/24/24 POSITIVE FOR MALIGNANT CELLS. Non- small cell carcinoma, favor adenocarcinoma - see Note. The cell block shows scant similar findings.  EBUS 24 ALL ln NEGATIVE  MRI Brain 24 - No evidence of intracranial metastasis.  12/10/2024: Pathology:  1. Lung, left upper lobe with mediastinal pleura, resection: - Poorly differentiated adenocarcinoma, solid (90%) and acinar (10%) patterns - 1.2 cm greatest dimension - Resection edge negative for adenocarcinoma - Fibrous adhesions with infiltrating adenocarcinoma present, see note - Negative for lymphovascular invasion - Negative for REGLA - Emphysematous changes of surrounding lung - See synoptic report below  Note: There is extensive fibrosis and inflammation surrounding the tumor. IHC workup reveals the malignant cells are positive for TTF-1. They are negative for Napsin and p40. Per Dr. MAURA Garland, the tumor was adhered to the mediastinal pleura. Fragments of fibrous adhesions containing infiltrating adenocarcinoma are present in the specimen container (grossly described as free-floating tissue). Because adenocarcinoma is present in the adhesions, this lesion will be staged as invading the mediastinal region of the parietal pleura (pT3). This case is discussed with Dr. Garland on 2024.  A portion the specimen will be sent for PD-L1 IHC and molecular testing.  2. Lung, left upper lobe, additional margin excision: - Negative for carcinoma - Emphysematous change and subpleural bleb 3. Lymph node, left level 9, excision: 1 lymph node negative for metastatic carcinoma, 0/1 4. Lymph node, level 7, excision: 1 lymph node negative for metastatic carcinoma, 0/1 5. Lymph node, left level 10, excision: 3 lymph nodes negative for metastatic carcinoma, 0/3 6. Lymph node, left level 5, excision: Multiple fragments of lymph node(s) negative for metastatic carcinoma  Verified by: Rickey Cordero M.D. Reported on: 24 16:08 [facility], [facility], [address] [place] [address], _________________________________________________________________  Comment mol 1A  Synoptic Summary 1: Lung - Resection Procedure: Wedge resection Specimen Laterality: Left Tumor Focality: Single focus Tumor Site: Upper lobe of lung Total Tumor Size: 1.2 Centimeters (cm) Size of Invasive Component: 1.2 Centimeters (cm) Histologic Type: Invasive solid adenocarcinoma Histologic Patterns Present: Acinar - 10%; Solid - 90% Histologic Grade: G3, poorly differentiated Spread Through Air Spaces (REGLA): Not identified Visceral Pleura Invasion: Present Direct Invasion of Adjacent Structures: Present Involved Adjacent Structures: Mediastinal region of parietal pleura Treatment Effect: No known presurgical therapy Lymphovascular Invasion: Not identified Margin Status for Invasive Carcinoma: All margins negative for invasive carcinoma Closest Margin(s) to Invasive Carcinoma: Parenchymal Distance from Invasive Carcinoma to Closest Margin: Greater than 0.8 cm Margin Status for Non-Invasive Tumor: Not applicable Lymph Node(s) from Prior Procedures: No known prior lymph node sampling performed Regional Lymph Node Status: All regional lymph nodes negative for tumor Number of Lymph Nodes Examined: At least 6 Amira Site(s) Examined: 7: Subcarinal; 5: Subaortic / aortopulmonary (AP) / AP window; 9L: Pulmonary ligament; 10L: Hilar Pathologic Stage Classification (pTNM, AJCC 8th Edition) pT Category: pT4 pN Category: pN0 Best Tumor Blocks for Future Studies Tumor Block(s): 1A  25: TTB - - Surgical pathology was reviewed, pleural margin is positive.  25: CT Chest: 1. Enlarging left superior mediastinal lesion with probable central necrosis measuring 25 x 25 mm (it measured 16 x 15 mm on whole-body PET/CT dated 2024. This may represent mediastinal tumor progression with central necrosis versus postsurgical changes/abscess. Clinical and laboratory correlation and follow-up whole-body PET/CT may be of value. 2. Interval postsurgical changes with chain sutures in the apical posterior segment of the left upper lobe in the paramediastinal region with increasing masslike consolidation in this region measuring 4 x 19 mm. Abbreviated differential includes tumor progression, postsurgical/radiation changes. Whole-body PET/CT correlation and short interval surveillance is suggested. 3. Additional stable findings as described above.  The patient completed CCRT with carbo/taxol 3/18/2025. PET/CT 25 to assess response revealed:  1. In the previously noted area of necrosis at the superior mediastinum, there is focal uptake at an ill-defined soft tissue density, SUV max 5.2, indeterminate in nature. 2. Suture material at the paramedian left lung apex is surrounded by an FDG avid opacity, SUV max 5.0. Favor postradiation changes, although residual disease cannot be excluded. 3. New focal uptake at vertebral body T2, possibly representing an osseous metastasis. Further evaluation with MRI may be of benefit. 4. Increased uptake along the right tongue extending to the base, probably inflammatory in nature. Correlate clinically. FDG avid left level 2A and bilateral level 2B nodes, probably related to oral pathology. 5. Focal uptake below the bladder, possibly representing a urethral diverticulum or urethritis. Correlate clinically. Additional smaller focus of activity at the posterior left labia majora, possibly urinary contamination, although an infected/inflammatory Bartholin gland cyst can have a similar appearance. Direct visualization is recommended to exclude a skin lesion.  MR  confirmed T2 metastatic lesion.   Disease: NSCLC, adenocarcinoma   Pathology: Adenocarcinoma - PDL1 40-50%, KRAS G12C   TNM stage: T4, N0, M0 AJCC Stage: IIIA   Tumor Markers: Positive margins, invasion of parietal pleura   Pulm: Dr. Nieves CTSx: Dr. Garland    Current Treatment Status:. . 25 = C1D1 carbo/taxol 25 = C1D8 carbo/taxol + RT.   [de-identified] :  Interval History:  Clinically doing well. Denies B symptoms. Ongoing pain managed by palliative care/ PM&R  6/12/25: MRI- 1.  No evidence of osseous metastasis in the lumbar spine. 2.  Mild L2 compression fracture stable from CT lumbar spine 8/28/2024. 3.  L4-5 mild to moderate bilateral neural foraminal narrowing.

## 2025-07-01 NOTE — HISTORY OF PRESENT ILLNESS
[de-identified] : 62F former smoker (27py, quit ~17y ago) referred by Dr. Nieves after surveillance CT scans showing interval growth of CHAPARRITA lesion with biopsy showing lung adenocarcinoma status post resection by Dr. Garland, final pathology with positive margins and mediastinal invasion, here for follow up.  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 Nieves for her COPD/atopic asthma. Given her smoking history, she also has been under surveillance for a lung nodule that has increased in size.  CT Chest 24 Left upper lobe 0.8 cm subsolid nodule contiguous with and adjacent to an emphysematous cystic air space medially, which demonstrates irregular nodular wall thickening as described above. The exact etiology is unclear, but one differential diagnostic consideration is for primary lung neoplasm. Recommend correlation with PET/CT. No evidence of pulmonary fibrosis as clinically questioned.  CT Chest 24 When compared to examination dated 2024 there has been interval development of a part solid nodule measuring 29 x 28 mm in transverse by AP dimensions (9:67) within the apical posterior segment of the left upper lobe. The solid component has increased in size measuring 26 x 12 mm as above. Further imaging to include whole-body PET/CT is suggested to exclude developing adenocarcinoma. Substantial paraseptal and moderate centrilobular emphysema again noted. Multiple stable subcentimeter as well as solid and calcified micronodules as described above.  PET-CT 24 Abnormal skull-to-thigh FDG-PET/CT scan. 1. An intensely FDG avid irregular solid nodule in the paramediastinal left lung apex, compatible with lung malignancy. No hypermetabolic hilar or mediastinal nodes. 2. New superior endplate deformities in T12 and L2 with increased activity may reflect recent trauma; please correlate clinically. 3. No suspicious FDG avid malignant tissue in the remaining field of view.  Lung, CHAPARRITA FNA Pathology 10/24/24 POSITIVE FOR MALIGNANT CELLS. Non- small cell carcinoma, favor adenocarcinoma - see Note. The cell block shows scant similar findings.  EBUS 24 ALL ln NEGATIVE  MRI Brain 24 - No evidence of intracranial metastasis.  12/10/2024: Pathology:  1. Lung, left upper lobe with mediastinal pleura, resection: - Poorly differentiated adenocarcinoma, solid (90%) and acinar (10%) patterns - 1.2 cm greatest dimension - Resection edge negative for adenocarcinoma - Fibrous adhesions with infiltrating adenocarcinoma present, see note - Negative for lymphovascular invasion - Negative for REGLA - Emphysematous changes of surrounding lung - See synoptic report below  Note: There is extensive fibrosis and inflammation surrounding the tumor. IHC workup reveals the malignant cells are positive for TTF-1. They are negative for Napsin and p40. Per Dr. MAURA Garland, the tumor was adhered to the mediastinal pleura. Fragments of fibrous adhesions containing infiltrating adenocarcinoma are present in the specimen container (grossly described as free-floating tissue). Because adenocarcinoma is present in the adhesions, this lesion will be staged as invading the mediastinal region of the parietal pleura (pT3). This case is discussed with Dr. Garland on 2024.  A portion the specimen will be sent for PD-L1 IHC and molecular testing.  2. Lung, left upper lobe, additional margin excision: - Negative for carcinoma - Emphysematous change and subpleural bleb 3. Lymph node, left level 9, excision: 1 lymph node negative for metastatic carcinoma, 0/1 4. Lymph node, level 7, excision: 1 lymph node negative for metastatic carcinoma, 0/1 5. Lymph node, left level 10, excision: 3 lymph nodes negative for metastatic carcinoma, 0/3 6. Lymph node, left level 5, excision: Multiple fragments of lymph node(s) negative for metastatic carcinoma  Verified by: Rickey Cordero M.D. Reported on: 24 16:08 [facility], [facility], [address] [place] [address], _________________________________________________________________  Comment mol 1A  Synoptic Summary 1: Lung - Resection Procedure: Wedge resection Specimen Laterality: Left Tumor Focality: Single focus Tumor Site: Upper lobe of lung Total Tumor Size: 1.2 Centimeters (cm) Size of Invasive Component: 1.2 Centimeters (cm) Histologic Type: Invasive solid adenocarcinoma Histologic Patterns Present: Acinar - 10%; Solid - 90% Histologic Grade: G3, poorly differentiated Spread Through Air Spaces (REGLA): Not identified Visceral Pleura Invasion: Present Direct Invasion of Adjacent Structures: Present Involved Adjacent Structures: Mediastinal region of parietal pleura Treatment Effect: No known presurgical therapy Lymphovascular Invasion: Not identified Margin Status for Invasive Carcinoma: All margins negative for invasive carcinoma Closest Margin(s) to Invasive Carcinoma: Parenchymal Distance from Invasive Carcinoma to Closest Margin: Greater than 0.8 cm Margin Status for Non-Invasive Tumor: Not applicable Lymph Node(s) from Prior Procedures: No known prior lymph node sampling performed Regional Lymph Node Status: All regional lymph nodes negative for tumor Number of Lymph Nodes Examined: At least 6 Amira Site(s) Examined: 7: Subcarinal; 5: Subaortic / aortopulmonary (AP) / AP window; 9L: Pulmonary ligament; 10L: Hilar Pathologic Stage Classification (pTNM, AJCC 8th Edition) pT Category: pT4 pN Category: pN0 Best Tumor Blocks for Future Studies Tumor Block(s): 1A  25: TTB - - Surgical pathology was reviewed, pleural margin is positive.  25: CT Chest: 1. Enlarging left superior mediastinal lesion with probable central necrosis measuring 25 x 25 mm (it measured 16 x 15 mm on whole-body PET/CT dated 2024. This may represent mediastinal tumor progression with central necrosis versus postsurgical changes/abscess. Clinical and laboratory correlation and follow-up whole-body PET/CT may be of value. 2. Interval postsurgical changes with chain sutures in the apical posterior segment of the left upper lobe in the paramediastinal region with increasing masslike consolidation in this region measuring 4 x 19 mm. Abbreviated differential includes tumor progression, postsurgical/radiation changes. Whole-body PET/CT correlation and short interval surveillance is suggested. 3. Additional stable findings as described above.  The patient completed CCRT with carbo/taxol 3/18/2025. PET/CT 25 to assess response revealed:  1. In the previously noted area of necrosis at the superior mediastinum, there is focal uptake at an ill-defined soft tissue density, SUV max 5.2, indeterminate in nature. 2. Suture material at the paramedian left lung apex is surrounded by an FDG avid opacity, SUV max 5.0. Favor postradiation changes, although residual disease cannot be excluded. 3. New focal uptake at vertebral body T2, possibly representing an osseous metastasis. Further evaluation with MRI may be of benefit. 4. Increased uptake along the right tongue extending to the base, probably inflammatory in nature. Correlate clinically. FDG avid left level 2A and bilateral level 2B nodes, probably related to oral pathology. 5. Focal uptake below the bladder, possibly representing a urethral diverticulum or urethritis. Correlate clinically. Additional smaller focus of activity at the posterior left labia majora, possibly urinary contamination, although an infected/inflammatory Bartholin gland cyst can have a similar appearance. Direct visualization is recommended to exclude a skin lesion.  MR  confirmed T2 metastatic lesion.   Disease: NSCLC, adenocarcinoma   Pathology: Adenocarcinoma - PDL1 40-50%, KRAS G12C   TNM stage: T4, N0, M0 AJCC Stage: IIIA   Tumor Markers: Positive margins, invasion of parietal pleura   Pulm: Dr. Nieves CTSx: Dr. Garland    Current Treatment Status:. . 25 = C1D1 carbo/taxol 25 = C1D8 carbo/taxol + RT.   [de-identified] :  Interval History:  Clinically doing well. Denies B symptoms. Ongoing pain managed by palliative care/ PM&R  6/12/25: MRI- 1.  No evidence of osseous metastasis in the lumbar spine. 2.  Mild L2 compression fracture stable from CT lumbar spine 8/28/2024. 3.  L4-5 mild to moderate bilateral neural foraminal narrowing.

## 2025-07-01 NOTE — ASSESSMENT
[FreeTextEntry1] : Adenocarcinoma of left lung (162.9) (C34.92)  63 yo F with smoking history, who quit 17 yrs ago here for follow up of KRAS G12C, PDL1 40-50%, NSCLC, stage IIIA, s/p wedge plus MLND on 12/10/2024 with Dr. Garland, now on adjuvant CCRT for + margin disease.  # NSCLC - reurrent/metastatic All lymph node samples during resection were negative for carcinoma, however there was direct invasion of mediastinum and pleura, which makes the patient T4, not T3, and also pathologically margins were positive and not negative as initially stated, therefore treatment plan has changed after multidisciplinary tumor board discussion for postop concurrent chemoradiation therapy.  She completed tx 3./18/25. Unfortunately interval scans with T2 metastatic disease.  I recommended treatment with single agent immunotherapy given recent exposure to chemotherapy and PD-L1 score 40-50%. Will reserve KRAS directed treatment to subsequent line. This was discussed with patient and she agreed.  C1D1 Apr 30th  So far tolerating treatment well without reported side effects. Continues to follow with PM&R (Dr Black) & palliative (Dr Law)  --Cleared for C4 --RTC 3 wks C5  --Response assessment scans will be planned for end of July   The patient is on immunotherapy requiring frequent and intensive monitoring for immune related adverse events. This includes CBC. TSH, CMP, and thorough history and directed examination.

## 2025-07-01 NOTE — ASSESSMENT
[FreeTextEntry1] : Adenocarcinoma of left lung (162.9) (C34.92)  61 yo F with smoking history, who quit 17 yrs ago here for follow up of KRAS G12C, PDL1 40-50%, NSCLC, stage IIIA, s/p wedge plus MLND on 12/10/2024 with Dr. Garland, now on adjuvant CCRT for + margin disease.  # NSCLC - reurrent/metastatic All lymph node samples during resection were negative for carcinoma, however there was direct invasion of mediastinum and pleura, which makes the patient T4, not T3, and also pathologically margins were positive and not negative as initially stated, therefore treatment plan has changed after multidisciplinary tumor board discussion for postop concurrent chemoradiation therapy.  She completed tx 3./18/25. Unfortunately interval scans with T2 metastatic disease.  I recommended treatment with single agent immunotherapy given recent exposure to chemotherapy and PD-L1 score 40-50%. Will reserve KRAS directed treatment to subsequent line. This was discussed with patient and she agreed.  C1D1 Apr 30th  So far tolerating treatment well without reported side effects. Continues to follow with PM&R (Dr Black) & palliative (Dr Law)  --Cleared for C4 --RTC 3 wks C5  --Response assessment scans will be planned for end of July   The patient is on immunotherapy requiring frequent and intensive monitoring for immune related adverse events. This includes CBC. TSH, CMP, and thorough history and directed examination.

## 2025-07-01 NOTE — PHYSICAL EXAM
[Restricted in physically strenuous activity but ambulatory and able to carry out work of a light or sedentary nature] : Status 1- Restricted in physically strenuous activity but ambulatory and able to carry out work of a light or sedentary nature, e.g., light house work, office work [Thin] : thin [Normal] : affect appropriate [Ambulatory and capable of all self care but unable to carry out any work activities] : Status 2- Ambulatory and capable of all self care but unable to carry out any work activities. Up and about more than 50% of waking hours

## 2025-07-02 NOTE — ASSESSMENT
[FreeTextEntry1] : Ms. MONICA POMPEY is a 62-year-old female with lung adenocarcinoma (direct invasion of pleura), intermediate T2 lesion, who presents for follow up for right leg pain and gait impairments   Assessment / Plan:  # Chronic lower back pain with RLE pain  - suspect combination of lumbosacral radiculopathy and ITB syndrome - Reviewed MRI Spine/hip with patient including relevant anatomy and clinical significance - She is now agreeable to outpatient therapy. Referred to [facility] for ease of location since she gets infusions at [facility].  - Defer medication titration to Dr. Law  # Chronic post-thoracotomy pain  - follow up with palliative care for medication titration  # Gait/ balance dysfunction - multifactorial - Continue rollator for fall prevention/ energy conservation  Follow-up: 6-8 weeks to review imaging   The patient expressed verbal understanding and is in agreement with the plan of care. All of the patient's questions and concerns were addressed during today's visit. Daughter, She, was updated about plan.

## 2025-07-02 NOTE — HISTORY OF PRESENT ILLNESS
[FreeTextEntry1] : Ms. MONICA POMPEY is a 62-year-old female with lung adenocarcinoma (direct invasion of pleura), intermediate T2 lesion, who presents for follow up for right leg pain and gait/ balance impairments   Oncologic Hx: - 24 - CT Chest - When compared to examination dated 2024 there has been interval development of a part solid nodule measuring 29 x 28 mm - 24 - PET-CT 1. An intensely FDG avid irregular solid nodule in the paramediastinal left lung apex, compatible with lung malignancy. No hypermetabolic hilar or mediastinal nodes. 2. New superior endplate deformities in T12 and L2 with increased activity may reflect recent trauma; please correlate clinically. 3. No suspicious FDG avid malignant tissue in the remaining field of view. - 12/10/2024: Pathology: Poorly differentiated adenocarcinoma, solid (90%) and acinar (10%) patterns with lymph node involvement. Lung - L Wedge resection. Total Tumor Size: 1.2 Centimeters (cm). Invasive solid adenocarcinoma, poorly differentiated - s/p RT (completed RT 2025) Current Treatment Status: Carbo/taxol   Pertinent PMHx: Asthma, COPD, DM2, Anxiety/ depression, glaucoma, cataracts, RA (on chronic steroids, currently being tapered, etanercept) --------------------------------------------------- 2025 -- Clinic Follow up. Last seen in clinic in May 2025. In the interim, she followed up with Dr. Law for medication titration. She obtained MR spine and right hip (reviewed) below which was notable for degenerative changes but did not show concern for malignant lesions. She continues to report right leg/ hip pain but feels less intense, still describes it as a burning/ radiating sensation. She continues to use a rolling walker for stability. No pain with weight bearing. Her daughter She is present over the phone.  --------------------------------------------------- Functional Performance Status: - KPS: 80 - ECO - ADLs/ iADLs: Independent - Mobility: Independent, uses Rollator - Physical Activities: household activities, mostly sedentary --------------------------------------------------- Social History: Lives in [place], [place]. Daughter is around for support. Has a HHA.   ---------------------------------------------------

## 2025-07-02 NOTE — DATA REVIEWED
[FreeTextEntry1] : 04/04/2025 PET-CT IMPRESSION: 1. In the previously noted area of necrosis at the superior mediastinum, there is focal uptake at an ill-defined soft tissue density, SUV max 5.2, indeterminate in nature. 2. Suture material at the paramedian left lung apex is surrounded by an FDG avid opacity, SUV max 5.0. Favor postradiation changes, although residual disease cannot be excluded. 3. New focal uptake at vertebral body T2, possibly representing an osseous metastasis. Further evaluation with MRI may be of benefit. 4. Increased uptake along the right tongue extending to the base, probably inflammatory in nature. Correlate clinically. FDG avid left level 2A and bilateral level 2B nodes, probably related to oral pathology. 5. Focal uptake below the bladder, possibly representing a urethral diverticulum or urethritis. Correlate clinically. Additional smaller focus of activity at the posterior left labia majora, possibly urinary contamination, although an infected/inflammatory Bartholin gland cyst can have a similar appearance. Direct visualization is recommended to exclude a skin lesion.  04/11/2025 MR Thoracic spine IMPRESSION: The study confirms metastasis to T2. No fracture or epidural extension. No other osseous metastatic lesions are seen.  6/12/2025 -- MR Lumbar spine / MRI right hip IMPRESSION: 1.  No evidence of osseous metastasis in the lumbar spine. 2.  Mild L2 compression fracture stable from CT lumbar spine 8/28/2024. 3.  L4-5 mild to moderate bilateral neural foraminal narrowing.  IMPRESSION: Minimal undersurface and intrasubstance fraying of the anterosuperior labrum. Intact hyaline cartilage. Mild insertional tendinosis of the gluteus minimus and the adductors bilaterally. 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 No surrounding marrow edema. No secondary complication.

## 2025-07-02 NOTE — HISTORY OF PRESENT ILLNESS
[FreeTextEntry1] : Ms. MONICA POMPEY is a 62-year-old female with lung adenocarcinoma (direct invasion of pleura), intermediate T2 lesion, who presents for follow up for right leg pain and gait/ balance impairments   Oncologic Hx: - 24 - CT Chest - When compared to examination dated 2024 there has been interval development of a part solid nodule measuring 29 x 28 mm - 24 - PET-CT 1. An intensely FDG avid irregular solid nodule in the paramediastinal left lung apex, compatible with lung malignancy. No hypermetabolic hilar or mediastinal nodes. 2. New superior endplate deformities in T12 and L2 with increased activity may reflect recent trauma; please correlate clinically. 3. No suspicious FDG avid malignant tissue in the remaining field of view. - 12/10/2024: Pathology: Poorly differentiated adenocarcinoma, solid (90%) and acinar (10%) patterns with lymph node involvement. Lung - L Wedge resection. Total Tumor Size: 1.2 Centimeters (cm). Invasive solid adenocarcinoma, poorly differentiated - s/p RT (completed RT 2025) Current Treatment Status: Carbo/taxol   Pertinent PMHx: Asthma, COPD, DM2, Anxiety/ depression, glaucoma, cataracts, RA (on chronic steroids, currently being tapered, etanercept) --------------------------------------------------- 2025 -- Clinic Follow up. Last seen in clinic in May 2025. In the interim, she followed up with Dr. Law for medication titration. She obtained MR spine and right hip (reviewed) below which was notable for degenerative changes but did not show concern for malignant lesions. She continues to report right leg/ hip pain but feels less intense, still describes it as a burning/ radiating sensation. She continues to use a rolling walker for stability. No pain with weight bearing. Her daughter She is present over the phone.  --------------------------------------------------- Functional Performance Status: - KPS: 80 - ECO - ADLs/ iADLs: Independent - Mobility: Independent, uses Rollator - Physical Activities: household activities, mostly sedentary --------------------------------------------------- Social History: Lives in [place], [place]. Daughter is around for support. Has a HHA.   ---------------------------------------------------

## 2025-07-02 NOTE — ASSESSMENT
[FreeTextEntry1] : Ms. MONICA POMPEY is a 62-year-old female with lung adenocarcinoma (direct invasion of pleura), intermediate T2 lesion, who presents for follow up for right leg pain and gait impairments   Assessment / Plan:  # Chronic lower back pain with RLE pain  - suspect combination of lumbosacral radiculopathy and ITB syndrome - Reviewed MRI Spine/hip with patient including relevant anatomy and clinical significance - She is now agreeable to outpatient therapy. Referred to [facility] for ease of location since she gets infusions at [facility].  - Defer medication titration to Dr. Law  # Chronic post-thoracotomy pain  - follow up with palliative care for medication titration  # Gait/ balance dysfunction - multifactorial - Continue rollator for fall prevention/ energy conservation  Follow-up: 6-8 weeks to review imaging   The patient expressed verbal understanding and is in agreement with the plan of care. All of the patient's questions and concerns were addressed during today's visit. Daughter, She, was updated about plan.

## 2025-07-09 NOTE — PHYSICAL EXAM
[Oriented To Time, Place, And Person] : oriented to person, place, and time [FreeTextEntry1] : Poor insight, irate and tearful during parts of interview

## 2025-07-09 NOTE — HISTORY OF PRESENT ILLNESS
[Home] : at home, [unfilled] , at the time of the visit. [Medical Office: ([facility])___] : at the medical office located in  [Verbal consent obtained from patient] : the patient, [unfilled] [Telephone (audio)] : This telephonic visit was provided via audio only technology. [Patient preference] : patient preference. [FreeTextEntry1] : - 61 yo F with lung adenocarcinoma (with invasion of parietal pleura), COPD, DM2, RA (chronic steroids), anxiety and depression here for palliative care support. Accompanied by daughter She.  Onc Hx: She has been seeing Dr. Nieves for her COPD/atopic asthma. Given her smoking history, she also has been under surveillance for a lung nodule that has increased in size. 8/23/24 - CT Chest - When compared to examination dated 5/30/2024 there has been interval development of a part solid nodule measuring 29 x 28 mm 9/25/24 - PET-CT Abnormal skull-to-thigh FDG-PET/CT scan. 1. An intensely FDG avid irregular solid nodule in the paramediastinal left lung apex, compatible with lung malignancy. No hypermetabolic hilar or mediastinal nodes. 2. New superior endplate deformities in T12 and L2 with increased activity may reflect recent trauma; please correlate clinically. 3. No suspicious FDG avid malignant tissue in the remaining field of view.  12/10/2024: Pathology: Poorly differentiated adenocarcinoma, solid (90%) and acinar (10%) patterns with lymph node involvement Lung - L Wedge resection. Total Tumor Size: 1.2 Centimeters (cm). Invasive solid adenocarcinoma, poorly differentiated   Providers: Oncologist - Oren Marcial - Gabriella Wernicke Pulm - Dr. Nieves CTSx - Dr. Garland [facility] - Jackelyn Black  Current Treatment: Pembrolizumab [id number] Previous Treatment: Carbo/taxol + RT (completed RT 4/2025)  Interval Hx: Saw Dr. Black for follow-up and went over MRI findings showing degenerative disc disease and likelihood of pain being a lumbosacral radiculopathy. Plan for PT. Off gabapentin. 3 pound weightloss over past month.  Patient became irate during visit, shouting at provider due to questions being asked of her regarding her symptoms and medications. Patient's daughter helped with some collaterals.  SYMPTOMS: #Pain Location - R upper leg Quality - numbness, cold Radiation - Up from knee to hip Timing - Constant Aggravating factors - None Minimal acceptable level (0-10 scale) - unable to quantify Severity in last 24h (0-10 scale) - 5/10 Current score (0-10 scale) - 5/10 On oxycodone 15mg PRN, not needing it every day. When she needs it, she takes max 2 doses a day, 30mg per dose which relieves the pain well. Endorse also taking it for arthritis pain. ISTOP Ref #: [id number]  #Neuropathy Burning at fingertips intermittently, but not every day. Oxycodone helps with pain  #N/V Throwing up this past week intermittently. Threw up on saturday night but has not thrown up rodo rest of the week. Thinks she overdid it with food, rice and beans specifically, and that is why she threw up. Was previously taking zofran and olanzapine PRN.  #Poor appetite Started taking Dronabinol 10mg daily with improvement in appetite previously but then felt that it was making her nauseous so she stopped it. Feels appetite is minimal.  #Anxiety/Depression Long h/o anxiety and depression. Takes seroquel at night for anxiety but has not been seeing a psychiatrist for some time. Saw psycho-onc but has not had follow-up in a few weeks.   Advanced Directives: - HCP: She Guillaume (daughter, [phone]); alternate HCP: Rich Guillaume (son, [phone]) - Full code (last discussed 4/14/25)   SH: Lives with partner of 44 years, Gavin, not . Has a daughter She and son Rich. 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   ROS: If [ ] blank, symptom not present Fatigue [ ] Nausea [ ] Loss of appetite [ ] Unintentional weight loss [ ] Constipation [ ] Diarrhea [ ] Anxiety [ ] Low mood [ ] Other symptoms: [x ] All other review of symptoms negative
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
## 2025-07-09 NOTE — ASSESSMENT
[FreeTextEntry1] : - 62F with lung adenocarcinoma (with invasion of parietal pleura), COPD, DM2, RA (chronic steroids), anxiety and depression here for palliative care support.  #Leg pain - Oxycodone 15mg PO q8h PRN - Bowel regimen while on opioids - Plan to start PT - Narcan intranasal spray sent to pharmacy previously  #Poor appetite - Told patient to restart dronabinol 10mg PO BID  #Anxiety/depression - Lost to follow-up with Dr. Banerjee. Unable to discuss her mood today  #ACP See [facility] note from 4/14/25 for details - HCP: She Guillaume (daughter, [phone]); alternate HCP: Rich Aundrea (son, [phone]) - Full code  F/u in 2 weeks

## 2025-07-09 NOTE — HISTORY OF PRESENT ILLNESS
[Home] : at home, [unfilled] , at the time of the visit. [Medical Office: ([facility])___] : at the medical office located in  [Verbal consent obtained from patient] : the patient, [unfilled] [Telephone (audio)] : This telephonic visit was provided via audio only technology. [Patient preference] : patient preference. [FreeTextEntry1] : - 63 yo F with lung adenocarcinoma (with invasion of parietal pleura), COPD, DM2, RA (chronic steroids), anxiety and depression here for palliative care support. Accompanied by daughter She.  Onc Hx: She has been seeing Dr. Nieves for her COPD/atopic asthma. Given her smoking history, she also has been under surveillance for a lung nodule that has increased in size. 8/23/24 - CT Chest - When compared to examination dated 5/30/2024 there has been interval development of a part solid nodule measuring 29 x 28 mm 9/25/24 - PET-CT Abnormal skull-to-thigh FDG-PET/CT scan. 1. An intensely FDG avid irregular solid nodule in the paramediastinal left lung apex, compatible with lung malignancy. No hypermetabolic hilar or mediastinal nodes. 2. New superior endplate deformities in T12 and L2 with increased activity may reflect recent trauma; please correlate clinically. 3. No suspicious FDG avid malignant tissue in the remaining field of view.  12/10/2024: Pathology: Poorly differentiated adenocarcinoma, solid (90%) and acinar (10%) patterns with lymph node involvement Lung - L Wedge resection. Total Tumor Size: 1.2 Centimeters (cm). Invasive solid adenocarcinoma, poorly differentiated   Providers: Oncologist - Oren Marcial - Gabriella Wernicke Pulm - Dr. Nieves CTSx - Dr. Garland [facility] - Jackelyn Black  Current Treatment: Pembrolizumab [id number] Previous Treatment: Carbo/taxol + RT (completed RT 4/2025)  Interval Hx: Saw Dr. Black for follow-up and went over MRI findings showing degenerative disc disease and likelihood of pain being a lumbosacral radiculopathy. Plan for PT. Off gabapentin. 3 pound weightloss over past month.  Patient became irate during visit, shouting at provider due to questions being asked of her regarding her symptoms and medications. Patient's daughter helped with some collaterals.  SYMPTOMS: #Pain Location - R upper leg Quality - numbness, cold Radiation - Up from knee to hip Timing - Constant Aggravating factors - None Minimal acceptable level (0-10 scale) - unable to quantify Severity in last 24h (0-10 scale) - 5/10 Current score (0-10 scale) - 5/10 On oxycodone 15mg PRN, not needing it every day. When she needs it, she takes max 2 doses a day, 30mg per dose which relieves the pain well. Endorse also taking it for arthritis pain. ISTOP Ref #: [id number]  #Neuropathy Burning at fingertips intermittently, but not every day. Oxycodone helps with pain  #N/V Throwing up this past week intermittently. Threw up on saturday night but has not thrown up rodo rest of the week. Thinks she overdid it with food, rice and beans specifically, and that is why she threw up. Was previously taking zofran and olanzapine PRN.  #Poor appetite Started taking Dronabinol 10mg daily with improvement in appetite previously but then felt that it was making her nauseous so she stopped it. Feels appetite is minimal.  #Anxiety/Depression Long h/o anxiety and depression. Takes seroquel at night for anxiety but has not been seeing a psychiatrist for some time. Saw psycho-onc but has not had follow-up in a few weeks.   Advanced Directives: - HCP: She Guillaume (daughter, [phone]); alternate HCP: Rich Guillaume (son, [phone]) - Full code (last discussed 4/14/25)   SH: Lives with partner of 44 years, Gavin, not . Has a daughter She and son Rich. 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   ROS: If [ ] blank, symptom not present Fatigue [ ] Nausea [ ] Loss of appetite [ ] Unintentional weight loss [ ] Constipation [ ] Diarrhea [ ] Anxiety [ ] Low mood [ ] Other symptoms: [x ] All other review of symptoms negative

## 2025-07-21 NOTE — ASSESSMENT
[FreeTextEntry1] : - 62F with lung adenocarcinoma (with invasion of parietal pleura), COPD, DM2, RA (chronic steroids), anxiety and depression here for palliative care support.  #Leg pain - Oxycodone 15-30mg PO q6h PRN - Bowel regimen while on opioids - Plan to start PT - Narcan intranasal spray sent to pharmacy previously  #Poor appetite - Patient agreed to restart dronabinol 10mg PO BID  #Anxiety/depression - Lost to follow-up with Dr. Banerjee. Agreeable to reaching out and restarting therapy - Agreeable to a psychiatry referral today  #ACP See [facility] note from 4/14/25 for details - HCP: She Guillaume (daughter, [phone]); alternate HCP: Rich Guillaume (son, [phone]) - Full code  F/u in 3 weeks

## 2025-07-21 NOTE — HISTORY OF PRESENT ILLNESS
[Home] : at home, [unfilled] , at the time of the visit. [Medical Office: ([facility])___] : at the medical office located in  [Telephone (audio)] : This telephonic visit was provided via audio only technology. [Patient preference] : patient preference. [Verbal consent obtained from patient] : the patient, [unfilled] [FreeTextEntry1] : - 61 yo F with lung adenocarcinoma (with invasion of parietal pleura), COPD, DM2, RA (chronic steroids), anxiety and depression here for palliative care support. Accompanied by daughter She.  Onc Hx: She has been seeing Dr. Nieves for her COPD/atopic asthma. Given her smoking history, she also has been under surveillance for a lung nodule that has increased in size. 8/23/24 - CT Chest - When compared to examination dated 5/30/2024 there has been interval development of a part solid nodule measuring 29 x 28 mm 9/25/24 - PET-CT Abnormal skull-to-thigh FDG-PET/CT scan. 1. An intensely FDG avid irregular solid nodule in the paramediastinal left lung apex, compatible with lung malignancy. No hypermetabolic hilar or mediastinal nodes. 2. New superior endplate deformities in T12 and L2 with increased activity may reflect recent trauma; please correlate clinically. 3. No suspicious FDG avid malignant tissue in the remaining field of view.  12/10/2024: Pathology: Poorly differentiated adenocarcinoma, solid (90%) and acinar (10%) patterns with lymph node involvement Lung - L Wedge resection. Total Tumor Size: 1.2 Centimeters (cm). Invasive solid adenocarcinoma, poorly differentiated   Providers: Oncologist - Oren Mercado Onc - Gabriella Wernicke Pulm - Dr. Nievse CTSx - Dr. Garland [facility] - Jackelyn Black  Current Treatment: Pembrolizumab [id number] Previous Treatment: Carbo/taxol + RT (completed RT 4/2025)  Interval Hx: Saw Dr. Black for follow-up and went over MRI findings showing degenerative disc disease and likelihood of pain being a lumbosacral radiculopathy. Plan for PT but has not yet started. Plans to schedule it tomorrow when she is in the building for treatment. Has not followed up with psycho-onc for therapy for a couple of months and has not seen psychiatry in 3 months.  SYMPTOMS: #Pain Location - R upper leg Quality - numbness, cold Radiation - Up from knee to hip Timing - Constant Aggravating factors - None Minimal acceptable level (0-10 scale) - unable to quantify Severity in last 24h (0-10 scale) - 8/10 Current score (0-10 scale) - 8/10 On oxycodone 15mg PRN, takes 2 at a time, 1-2 doses a day. Brings it down to a "shallow level" that "is tolerable" but does not take the pain away completely. When she takes 1 pill she does not get adequate relief. ISTOP Ref #: [id number]  #Neuropathy Burning at fingertips intermittently, but not every day. Oxycodone helps with pain.  #N/V More nauseous the past couple of weeks. Taking ondansetron 4mg ODT but not getting adequate relief, especially when she takes it after already becoming very nauseous. Daughter notes that anxiety seems to worsen her nausea and appetite. Takes olanzapine for the first 5 days of treatment.  #Poor appetite Started taking Dronabinol 10mg daily with improvement in appetite previously but then thought that it was making her nauseous so she stopped it. Discussed restarting it after last visit but she still has not tried it. Feels appetite is minimal, some days not eating any solid foods and relying on protein supplement shakes.  #Anxiety/Depression Long h/o anxiety and depression. Takes seroquel at night for anxiety but has not been seeing a psychiatrist for some time. Saw psycho-onc but has not had follow-up in a few weeks.  Advanced Directives: - HCP: She Guillaume (daughter, [phone]); alternate HCP: Rich Guillaume (son, [phone]) - Full code (last discussed 4/14/25)   SH: Lives with partner of 44 years, Gavin, not . Has a daughter She and son Rich. 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   ROS: If [ ] blank, symptom not present Fatigue [ ] Nausea [ ] Loss of appetite [ ] Unintentional weight loss [ ] Constipation [ ] Diarrhea [ ] Anxiety [ ] Low mood [ ] Other symptoms: [x ] All other review of symptoms negative

## 2025-07-24 NOTE — HISTORY OF PRESENT ILLNESS
[de-identified] : 62F former smoker (27py, quit ~17y ago) referred by Dr. Nieves after surveillance CT scans showing interval growth of CHAPARRITA lesion with biopsy showing lung adenocarcinoma status post resection by Dr. Garland, final pathology with positive margins and mediastinal invasion, here for follow up.  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 Nieves for her COPD/atopic asthma. Given her smoking history, she also has been under surveillance for a lung nodule that has increased in size.  CT Chest 24 Left upper lobe 0.8 cm subsolid nodule contiguous with and adjacent to an emphysematous cystic air space medially, which demonstrates irregular nodular wall thickening as described above. The exact etiology is unclear, but one differential diagnostic consideration is for primary lung neoplasm. Recommend correlation with PET/CT. No evidence of pulmonary fibrosis as clinically questioned.  CT Chest 24 When compared to examination dated 2024 there has been interval development of a part solid nodule measuring 29 x 28 mm in transverse by AP dimensions (9:67) within the apical posterior segment of the left upper lobe. The solid component has increased in size measuring 26 x 12 mm as above. Further imaging to include whole-body PET/CT is suggested to exclude developing adenocarcinoma. Substantial paraseptal and moderate centrilobular emphysema again noted. Multiple stable subcentimeter as well as solid and calcified micronodules as described above.  PET-CT 24 Abnormal skull-to-thigh FDG-PET/CT scan. 1. An intensely FDG avid irregular solid nodule in the paramediastinal left lung apex, compatible with lung malignancy. No hypermetabolic hilar or mediastinal nodes. 2. New superior endplate deformities in T12 and L2 with increased activity may reflect recent trauma; please correlate clinically. 3. No suspicious FDG avid malignant tissue in the remaining field of view.  Lung, CHAPARRITA FNA Pathology 10/24/24 POSITIVE FOR MALIGNANT CELLS. Non- small cell carcinoma, favor adenocarcinoma - see Note. The cell block shows scant similar findings.  EBUS 24 ALL ln NEGATIVE  MRI Brain 24 - No evidence of intracranial metastasis.  12/10/2024: Pathology:  1. Lung, left upper lobe with mediastinal pleura, resection: - Poorly differentiated adenocarcinoma, solid (90%) and acinar (10%) patterns - 1.2 cm greatest dimension - Resection edge negative for adenocarcinoma - Fibrous adhesions with infiltrating adenocarcinoma present, see note - Negative for lymphovascular invasion - Negative for REGLA - Emphysematous changes of surrounding lung - See synoptic report below  Note: There is extensive fibrosis and inflammation surrounding the tumor. IHC workup reveals the malignant cells are positive for TTF-1. They are negative for Napsin and p40. Per Dr. MAURA Garland, the tumor was adhered to the mediastinal pleura. Fragments of fibrous adhesions containing infiltrating adenocarcinoma are present in the specimen container (grossly described as free-floating tissue). Because adenocarcinoma is present in the adhesions, this lesion will be staged as invading the mediastinal region of the parietal pleura (pT3). This case is discussed with Dr. Garland on 2024.  A portion the specimen will be sent for PD-L1 IHC and molecular testing.  2. Lung, left upper lobe, additional margin excision: - Negative for carcinoma - Emphysematous change and subpleural bleb 3. Lymph node, left level 9, excision: 1 lymph node negative for metastatic carcinoma, 0/1 4. Lymph node, level 7, excision: 1 lymph node negative for metastatic carcinoma, 0/1 5. Lymph node, left level 10, excision: 3 lymph nodes negative for metastatic carcinoma, 0/3 6. Lymph node, left level 5, excision: Multiple fragments of lymph node(s) negative for metastatic carcinoma  Verified by: Rickey Cordero M.D. Reported on: 24 16:08 [facility], [facility], [address] [place] [address], _________________________________________________________________  Comment mol 1A  Synoptic Summary 1: Lung - Resection Procedure: Wedge resection Specimen Laterality: Left Tumor Focality: Single focus Tumor Site: Upper lobe of lung Total Tumor Size: 1.2 Centimeters (cm) Size of Invasive Component: 1.2 Centimeters (cm) Histologic Type: Invasive solid adenocarcinoma Histologic Patterns Present: Acinar - 10%; Solid - 90% Histologic Grade: G3, poorly differentiated Spread Through Air Spaces (REGLA): Not identified Visceral Pleura Invasion: Present Direct Invasion of Adjacent Structures: Present Involved Adjacent Structures: Mediastinal region of parietal pleura Treatment Effect: No known presurgical therapy Lymphovascular Invasion: Not identified Margin Status for Invasive Carcinoma: All margins negative for invasive carcinoma Closest Margin(s) to Invasive Carcinoma: Parenchymal Distance from Invasive Carcinoma to Closest Margin: Greater than 0.8 cm Margin Status for Non-Invasive Tumor: Not applicable Lymph Node(s) from Prior Procedures: No known prior lymph node sampling performed Regional Lymph Node Status: All regional lymph nodes negative for tumor Number of Lymph Nodes Examined: At least 6 Amira Site(s) Examined: 7: Subcarinal; 5: Subaortic / aortopulmonary (AP) / AP window; 9L: Pulmonary ligament; 10L: Hilar Pathologic Stage Classification (pTNM, AJCC 8th Edition) pT Category: pT4 pN Category: pN0 Best Tumor Blocks for Future Studies Tumor Block(s): 1A  25: TTB - - Surgical pathology was reviewed, pleural margin is positive.  25: CT Chest: 1. Enlarging left superior mediastinal lesion with probable central necrosis measuring 25 x 25 mm (it measured 16 x 15 mm on whole-body PET/CT dated 2024. This may represent mediastinal tumor progression with central necrosis versus postsurgical changes/abscess. Clinical and laboratory correlation and follow-up whole-body PET/CT may be of value. 2. Interval postsurgical changes with chain sutures in the apical posterior segment of the left upper lobe in the paramediastinal region with increasing masslike consolidation in this region measuring 4 x 19 mm. Abbreviated differential includes tumor progression, postsurgical/radiation changes. Whole-body PET/CT correlation and short interval surveillance is suggested. 3. Additional stable findings as described above.  The patient completed CCRT with carbo/taxol 3/18/2025. PET/CT 25 to assess response revealed:  1. In the previously noted area of necrosis at the superior mediastinum, there is focal uptake at an ill-defined soft tissue density, SUV max 5.2, indeterminate in nature. 2. Suture material at the paramedian left lung apex is surrounded by an FDG avid opacity, SUV max 5.0. Favor postradiation changes, although residual disease cannot be excluded. 3. New focal uptake at vertebral body T2, possibly representing an osseous metastasis. Further evaluation with MRI may be of benefit. 4. Increased uptake along the right tongue extending to the base, probably inflammatory in nature. Correlate clinically. FDG avid left level 2A and bilateral level 2B nodes, probably related to oral pathology. 5. Focal uptake below the bladder, possibly representing a urethral diverticulum or urethritis. Correlate clinically. Additional smaller focus of activity at the posterior left labia majora, possibly urinary contamination, although an infected/inflammatory Bartholin gland cyst can have a similar appearance. Direct visualization is recommended to exclude a skin lesion.  MR  confirmed T2 metastatic lesion.   25: MRI- 1.  No evidence of osseous metastasis in the lumbar spine. 2.  Mild L2 compression fracture stable from CT lumbar spine 2024. 3.  L4-5 mild to moderate bilateral neural foraminal narrowing.  Disease: NSCLC, adenocarcinoma   Pathology: Adenocarcinoma - PDL1 40-50%, KRAS G12C   TNM stage: T4, N0, M0 AJCC Stage: IIIA   Tumor Markers: Positive margins, invasion of parietal pleura   Pulm: Dr. Nieves CTSx: Dr. Garland    Current Treatment Status:. . 25 = C1D1 carbo/taxol 25 = C1D8 carbo/taxol + RT.   [de-identified] : Doing a bit better at this visit with improvement in mood. Pain is under reasonable control and she has no evidence of RA flare at this time

## 2025-07-24 NOTE — HISTORY OF PRESENT ILLNESS
[de-identified] : 62F former smoker (27py, quit ~17y ago) referred by Dr. Nieves after surveillance CT scans showing interval growth of CHAPARRITA lesion with biopsy showing lung adenocarcinoma status post resection by Dr. Garland, final pathology with positive margins and mediastinal invasion, here for follow up.  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 Nieves for her COPD/atopic asthma. Given her smoking history, she also has been under surveillance for a lung nodule that has increased in size.  CT Chest 24 Left upper lobe 0.8 cm subsolid nodule contiguous with and adjacent to an emphysematous cystic air space medially, which demonstrates irregular nodular wall thickening as described above. The exact etiology is unclear, but one differential diagnostic consideration is for primary lung neoplasm. Recommend correlation with PET/CT. No evidence of pulmonary fibrosis as clinically questioned.  CT Chest 24 When compared to examination dated 2024 there has been interval development of a part solid nodule measuring 29 x 28 mm in transverse by AP dimensions (9:67) within the apical posterior segment of the left upper lobe. The solid component has increased in size measuring 26 x 12 mm as above. Further imaging to include whole-body PET/CT is suggested to exclude developing adenocarcinoma. Substantial paraseptal and moderate centrilobular emphysema again noted. Multiple stable subcentimeter as well as solid and calcified micronodules as described above.  PET-CT 24 Abnormal skull-to-thigh FDG-PET/CT scan. 1. An intensely FDG avid irregular solid nodule in the paramediastinal left lung apex, compatible with lung malignancy. No hypermetabolic hilar or mediastinal nodes. 2. New superior endplate deformities in T12 and L2 with increased activity may reflect recent trauma; please correlate clinically. 3. No suspicious FDG avid malignant tissue in the remaining field of view.  Lung, CHAPARRITA FNA Pathology 10/24/24 POSITIVE FOR MALIGNANT CELLS. Non- small cell carcinoma, favor adenocarcinoma - see Note. The cell block shows scant similar findings.  EBUS 24 ALL ln NEGATIVE  MRI Brain 24 - No evidence of intracranial metastasis.  12/10/2024: Pathology:  1. Lung, left upper lobe with mediastinal pleura, resection: - Poorly differentiated adenocarcinoma, solid (90%) and acinar (10%) patterns - 1.2 cm greatest dimension - Resection edge negative for adenocarcinoma - Fibrous adhesions with infiltrating adenocarcinoma present, see note - Negative for lymphovascular invasion - Negative for REGLA - Emphysematous changes of surrounding lung - See synoptic report below  Note: There is extensive fibrosis and inflammation surrounding the tumor. IHC workup reveals the malignant cells are positive for TTF-1. They are negative for Napsin and p40. Per Dr. MAURA Garland, the tumor was adhered to the mediastinal pleura. Fragments of fibrous adhesions containing infiltrating adenocarcinoma are present in the specimen container (grossly described as free-floating tissue). Because adenocarcinoma is present in the adhesions, this lesion will be staged as invading the mediastinal region of the parietal pleura (pT3). This case is discussed with Dr. Garland on 2024.  A portion the specimen will be sent for PD-L1 IHC and molecular testing.  2. Lung, left upper lobe, additional margin excision: - Negative for carcinoma - Emphysematous change and subpleural bleb 3. Lymph node, left level 9, excision: 1 lymph node negative for metastatic carcinoma, 0/1 4. Lymph node, level 7, excision: 1 lymph node negative for metastatic carcinoma, 0/1 5. Lymph node, left level 10, excision: 3 lymph nodes negative for metastatic carcinoma, 0/3 6. Lymph node, left level 5, excision: Multiple fragments of lymph node(s) negative for metastatic carcinoma  Verified by: Rickey Cordero M.D. Reported on: 24 16:08 [facility], [facility], [address] [place] [address], _________________________________________________________________  Comment mol 1A  Synoptic Summary 1: Lung - Resection Procedure: Wedge resection Specimen Laterality: Left Tumor Focality: Single focus Tumor Site: Upper lobe of lung Total Tumor Size: 1.2 Centimeters (cm) Size of Invasive Component: 1.2 Centimeters (cm) Histologic Type: Invasive solid adenocarcinoma Histologic Patterns Present: Acinar - 10%; Solid - 90% Histologic Grade: G3, poorly differentiated Spread Through Air Spaces (REGLA): Not identified Visceral Pleura Invasion: Present Direct Invasion of Adjacent Structures: Present Involved Adjacent Structures: Mediastinal region of parietal pleura Treatment Effect: No known presurgical therapy Lymphovascular Invasion: Not identified Margin Status for Invasive Carcinoma: All margins negative for invasive carcinoma Closest Margin(s) to Invasive Carcinoma: Parenchymal Distance from Invasive Carcinoma to Closest Margin: Greater than 0.8 cm Margin Status for Non-Invasive Tumor: Not applicable Lymph Node(s) from Prior Procedures: No known prior lymph node sampling performed Regional Lymph Node Status: All regional lymph nodes negative for tumor Number of Lymph Nodes Examined: At least 6 Amira Site(s) Examined: 7: Subcarinal; 5: Subaortic / aortopulmonary (AP) / AP window; 9L: Pulmonary ligament; 10L: Hilar Pathologic Stage Classification (pTNM, AJCC 8th Edition) pT Category: pT4 pN Category: pN0 Best Tumor Blocks for Future Studies Tumor Block(s): 1A  25: TTB - - Surgical pathology was reviewed, pleural margin is positive.  25: CT Chest: 1. Enlarging left superior mediastinal lesion with probable central necrosis measuring 25 x 25 mm (it measured 16 x 15 mm on whole-body PET/CT dated 2024. This may represent mediastinal tumor progression with central necrosis versus postsurgical changes/abscess. Clinical and laboratory correlation and follow-up whole-body PET/CT may be of value. 2. Interval postsurgical changes with chain sutures in the apical posterior segment of the left upper lobe in the paramediastinal region with increasing masslike consolidation in this region measuring 4 x 19 mm. Abbreviated differential includes tumor progression, postsurgical/radiation changes. Whole-body PET/CT correlation and short interval surveillance is suggested. 3. Additional stable findings as described above.  The patient completed CCRT with carbo/taxol 3/18/2025. PET/CT 25 to assess response revealed:  1. In the previously noted area of necrosis at the superior mediastinum, there is focal uptake at an ill-defined soft tissue density, SUV max 5.2, indeterminate in nature. 2. Suture material at the paramedian left lung apex is surrounded by an FDG avid opacity, SUV max 5.0. Favor postradiation changes, although residual disease cannot be excluded. 3. New focal uptake at vertebral body T2, possibly representing an osseous metastasis. Further evaluation with MRI may be of benefit. 4. Increased uptake along the right tongue extending to the base, probably inflammatory in nature. Correlate clinically. FDG avid left level 2A and bilateral level 2B nodes, probably related to oral pathology. 5. Focal uptake below the bladder, possibly representing a urethral diverticulum or urethritis. Correlate clinically. Additional smaller focus of activity at the posterior left labia majora, possibly urinary contamination, although an infected/inflammatory Bartholin gland cyst can have a similar appearance. Direct visualization is recommended to exclude a skin lesion.  MR  confirmed T2 metastatic lesion.   25: MRI- 1.  No evidence of osseous metastasis in the lumbar spine. 2.  Mild L2 compression fracture stable from CT lumbar spine 2024. 3.  L4-5 mild to moderate bilateral neural foraminal narrowing.  Disease: NSCLC, adenocarcinoma   Pathology: Adenocarcinoma - PDL1 40-50%, KRAS G12C   TNM stage: T4, N0, M0 AJCC Stage: IIIA   Tumor Markers: Positive margins, invasion of parietal pleura   Pulm: Dr. Nieves CTSx: Dr. Garland    Current Treatment Status:. . 25 = C1D1 carbo/taxol 25 = C1D8 carbo/taxol + RT.   [de-identified] : Doing a bit better at this visit with improvement in mood. Pain is under reasonable control and she has no evidence of RA flare at this time

## 2025-07-24 NOTE — ASSESSMENT
[FreeTextEntry1] : Adenocarcinoma of left lung (162.9) (C34.92)  61 yo F with smoking history, who quit 17 yrs ago here for follow up of KRAS G12C, PDL1 40-50%, NSCLC, stage IIIA, s/p wedge plus MLND on 12/10/2024 with Dr. Garland, now on adjuvant CCRT for + margin disease.  # NSCLC - reurrent/metastatic All lymph node samples during resection were negative for carcinoma, however there was direct invasion of mediastinum and pleura, which makes the patient T4, not T3, and also pathologically margins were positive and not negative as initially stated, therefore treatment plan has changed after multidisciplinary tumor board discussion for postop concurrent chemoradiation therapy.  She completed tx 3./18/25. Unfortunately interval scans with T2 metastatic disease.  I recommended treatment with single agent immunotherapy given recent exposure to chemotherapy and PD-L1 score 40-50%. Will reserve KRAS directed treatment to subsequent line. This was discussed with patient and she agreed.  C1D1 Apr 30th  So far tolerating treatment well without reported side effects. Continues to follow with PM&R (Dr Black), palliative (Dr Law), psychonc (Dr Banerjee). Also has out-of-system rheumatologist who manages her RA which remains well controlled  --Cleared for C5 --Will continue to closely monitor for signs of RA flares. Currently not exhibiting any symptoms (joint swelling/stiffness). Follows with Dr Mandi Seymour, currently gets Kenalog-40 injections q6-8weeks, last 5/30/2025 -- Triamcinolone Rx sent for foot rash --RTC 3 wks for C6 after scans (ordered)   The patient is on immunotherapy requiring frequent and intensive monitoring for immune related adverse events. This includes CBC. TSH, CMP, and thorough history and directed examination.

## 2025-07-24 NOTE — ASSESSMENT
[FreeTextEntry1] : Adenocarcinoma of left lung (162.9) (C34.92)  63 yo F with smoking history, who quit 17 yrs ago here for follow up of KRAS G12C, PDL1 40-50%, NSCLC, stage IIIA, s/p wedge plus MLND on 12/10/2024 with Dr. Garland, now on adjuvant CCRT for + margin disease.  # NSCLC - reurrent/metastatic All lymph node samples during resection were negative for carcinoma, however there was direct invasion of mediastinum and pleura, which makes the patient T4, not T3, and also pathologically margins were positive and not negative as initially stated, therefore treatment plan has changed after multidisciplinary tumor board discussion for postop concurrent chemoradiation therapy.  She completed tx 3./18/25. Unfortunately interval scans with T2 metastatic disease.  I recommended treatment with single agent immunotherapy given recent exposure to chemotherapy and PD-L1 score 40-50%. Will reserve KRAS directed treatment to subsequent line. This was discussed with patient and she agreed.  C1D1 Apr 30th  So far tolerating treatment well without reported side effects. Continues to follow with PM&R (Dr Black), palliative (Dr Law), psychonc (Dr Banerjee). Also has out-of-system rheumatologist who manages her RA which remains well controlled  --Cleared for C5 --Will continue to closely monitor for signs of RA flares. Currently not exhibiting any symptoms (joint swelling/stiffness). Follows with Dr Mandi Seymour, currently gets Kenalog-40 injections q6-8weeks, last 5/30/2025 -- Triamcinolone Rx sent for foot rash --RTC 3 wks for C6 after scans (ordered)   The patient is on immunotherapy requiring frequent and intensive monitoring for immune related adverse events. This includes CBC. TSH, CMP, and thorough history and directed examination.

## 2025-07-24 NOTE — HISTORY OF PRESENT ILLNESS
[de-identified] : 62F former smoker (27py, quit ~17y ago) referred by Dr. Nieves after surveillance CT scans showing interval growth of CHAPARRITA lesion with biopsy showing lung adenocarcinoma status post resection by Dr. Garland, final pathology with positive margins and mediastinal invasion, here for follow up.  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 Nieves for her COPD/atopic asthma. Given her smoking history, she also has been under surveillance for a lung nodule that has increased in size.  CT Chest 24 Left upper lobe 0.8 cm subsolid nodule contiguous with and adjacent to an emphysematous cystic air space medially, which demonstrates irregular nodular wall thickening as described above. The exact etiology is unclear, but one differential diagnostic consideration is for primary lung neoplasm. Recommend correlation with PET/CT. No evidence of pulmonary fibrosis as clinically questioned.  CT Chest 24 When compared to examination dated 2024 there has been interval development of a part solid nodule measuring 29 x 28 mm in transverse by AP dimensions (9:67) within the apical posterior segment of the left upper lobe. The solid component has increased in size measuring 26 x 12 mm as above. Further imaging to include whole-body PET/CT is suggested to exclude developing adenocarcinoma. Substantial paraseptal and moderate centrilobular emphysema again noted. Multiple stable subcentimeter as well as solid and calcified micronodules as described above.  PET-CT 24 Abnormal skull-to-thigh FDG-PET/CT scan. 1. An intensely FDG avid irregular solid nodule in the paramediastinal left lung apex, compatible with lung malignancy. No hypermetabolic hilar or mediastinal nodes. 2. New superior endplate deformities in T12 and L2 with increased activity may reflect recent trauma; please correlate clinically. 3. No suspicious FDG avid malignant tissue in the remaining field of view.  Lung, CHAPARRITA FNA Pathology 10/24/24 POSITIVE FOR MALIGNANT CELLS. Non- small cell carcinoma, favor adenocarcinoma - see Note. The cell block shows scant similar findings.  EBUS 24 ALL ln NEGATIVE  MRI Brain 24 - No evidence of intracranial metastasis.  12/10/2024: Pathology:  1. Lung, left upper lobe with mediastinal pleura, resection: - Poorly differentiated adenocarcinoma, solid (90%) and acinar (10%) patterns - 1.2 cm greatest dimension - Resection edge negative for adenocarcinoma - Fibrous adhesions with infiltrating adenocarcinoma present, see note - Negative for lymphovascular invasion - Negative for REGLA - Emphysematous changes of surrounding lung - See synoptic report below  Note: There is extensive fibrosis and inflammation surrounding the tumor. IHC workup reveals the malignant cells are positive for TTF-1. They are negative for Napsin and p40. Per Dr. MAURA Garland, the tumor was adhered to the mediastinal pleura. Fragments of fibrous adhesions containing infiltrating adenocarcinoma are present in the specimen container (grossly described as free-floating tissue). Because adenocarcinoma is present in the adhesions, this lesion will be staged as invading the mediastinal region of the parietal pleura (pT3). This case is discussed with Dr. Garland on 2024.  A portion the specimen will be sent for PD-L1 IHC and molecular testing.  2. Lung, left upper lobe, additional margin excision: - Negative for carcinoma - Emphysematous change and subpleural bleb 3. Lymph node, left level 9, excision: 1 lymph node negative for metastatic carcinoma, 0/1 4. Lymph node, level 7, excision: 1 lymph node negative for metastatic carcinoma, 0/1 5. Lymph node, left level 10, excision: 3 lymph nodes negative for metastatic carcinoma, 0/3 6. Lymph node, left level 5, excision: Multiple fragments of lymph node(s) negative for metastatic carcinoma  Verified by: Rickey Cordero M.D. Reported on: 24 16:08 [facility], , [address] [place] [address], _________________________________________________________________  Comment mol 1A  Synoptic Summary 1: Lung - Resection Procedure: Wedge resection Specimen Laterality: Left Tumor Focality: Single focus Tumor Site: Upper lobe of lung Total Tumor Size: 1.2 Centimeters (cm) Size of Invasive Component: 1.2 Centimeters (cm) Histologic Type: Invasive solid adenocarcinoma Histologic Patterns Present: Acinar - 10%; Solid - 90% Histologic Grade: G3, poorly differentiated Spread Through Air Spaces (REGLA): Not identified Visceral Pleura Invasion: Present Direct Invasion of Adjacent Structures: Present Involved Adjacent Structures: Mediastinal region of parietal pleura Treatment Effect: No known presurgical therapy Lymphovascular Invasion: Not identified Margin Status for Invasive Carcinoma: All margins negative for invasive carcinoma Closest Margin(s) to Invasive Carcinoma: Parenchymal Distance from Invasive Carcinoma to Closest Margin: Greater than 0.8 cm Margin Status for Non-Invasive Tumor: Not applicable Lymph Node(s) from Prior Procedures: No known prior lymph node sampling performed Regional Lymph Node Status: All regional lymph nodes negative for tumor Number of Lymph Nodes Examined: At least 6 Amira Site(s) Examined: 7: Subcarinal; 5: Subaortic / aortopulmonary (AP) / AP window; 9L: Pulmonary ligament; 10L: Hilar Pathologic Stage Classification (pTNM, AJCC 8th Edition) pT Category: pT4 pN Category: pN0 Best Tumor Blocks for Future Studies Tumor Block(s): 1A  25: TTB - - Surgical pathology was reviewed, pleural margin is positive.  25: CT Chest: 1. Enlarging left superior mediastinal lesion with probable central necrosis measuring 25 x 25 mm (it measured 16 x 15 mm on whole-body PET/CT dated 2024. This may represent mediastinal tumor progression with central necrosis versus postsurgical changes/abscess. Clinical and laboratory correlation and follow-up whole-body PET/CT may be of value. 2. Interval postsurgical changes with chain sutures in the apical posterior segment of the left upper lobe in the paramediastinal region with increasing masslike consolidation in this region measuring 4 x 19 mm. Abbreviated differential includes tumor progression, postsurgical/radiation changes. Whole-body PET/CT correlation and short interval surveillance is suggested. 3. Additional stable findings as described above.  The patient completed CCRT with carbo/taxol 3/18/2025. PET/CT 25 to assess response revealed:  1. In the previously noted area of necrosis at the superior mediastinum, there is focal uptake at an ill-defined soft tissue density, SUV max 5.2, indeterminate in nature. 2. Suture material at the paramedian left lung apex is surrounded by an FDG avid opacity, SUV max 5.0. Favor postradiation changes, although residual disease cannot be excluded. 3. New focal uptake at vertebral body T2, possibly representing an osseous metastasis. Further evaluation with MRI may be of benefit. 4. Increased uptake along the right tongue extending to the base, probably inflammatory in nature. Correlate clinically. FDG avid left level 2A and bilateral level 2B nodes, probably related to oral pathology. 5. Focal uptake below the bladder, possibly representing a urethral diverticulum or urethritis. Correlate clinically. Additional smaller focus of activity at the posterior left labia majora, possibly urinary contamination, although an infected/inflammatory Bartholin gland cyst can have a similar appearance. Direct visualization is recommended to exclude a skin lesion.  MR  confirmed T2 metastatic lesion.   25: MRI- 1.  No evidence of osseous metastasis in the lumbar spine. 2.  Mild L2 compression fracture stable from CT lumbar spine 2024. 3.  L4-5 mild to moderate bilateral neural foraminal narrowing.  Disease: NSCLC, adenocarcinoma   Pathology: Adenocarcinoma - PDL1 40-50%, KRAS G12C   TNM stage: T4, N0, M0 AJCC Stage: IIIA   Tumor Markers: Positive margins, invasion of parietal pleura   Pulm: Dr. Nieves CTSx: Dr. Garland    Current Treatment Status:. . 25 = C1D1 carbo/taxol 25 = C1D8 carbo/taxol + RT.   [de-identified] : Doing a bit better at this visit with improvement in mood. Pain is under reasonable control and she has no evidence of RA flare at this time

## 2025-07-29 NOTE — REVIEW OF SYSTEMS
[Fatigue] : fatigue [Chest Tightness] : chest tightness [SOB on Exertion] : sob on exertion [Chest Discomfort] : chest discomfort [Immunocompromised] : immunocompromised [Arthralgias] : arthralgias [Chronic Pain] : chronic pain [Negative] : Psychiatric [Cough] : no cough [Dyspnea] : no dyspnea [Pleuritic Pain] : no pleuritic pain [Wheezing] : no wheezing [TextBox_57] : chronic steroids

## 2025-07-29 NOTE — HISTORY OF PRESENT ILLNESS
[Former] : former [>= 20 pack years] : >= 20 pack years [Rheumatologic] : rheumatologic [TextBox_4] : 62F (accomapnied by her daughter: She) former smoker (smoked about one pack a day for 27 years, quit 17 years ago) with COPD (atopic phenotype) and ICU admissions in the past (never intubated) referred to pulm to establish care. At initial visit was using symbicort and albuterol plus chest vest for airway clearance twice a day. Could only walk maximum 1-2 blocks before she gets short of breath and was chronically on 60mg prednisone daily for many years, and bactrim qd. She is commonly exposed to people who smoke cigarettes which also triggers her COPD. Has RA. Follows with Dr. Dotty Avila. Currently on hydroxychloraquine 100-200mg once a day.  PFTs obtained, demonstrated mod obstruction and restriction, mod reduced DLCO, no BDR. Decreased 6MWT to 252 meters without desaturation. No hypercapnia on ABG. Found to have elevated IgE (411) while ON prednisone 60mg. She was escalated to triple therapy (incruse/symbicort) + azithromycin and instructed to attempt very slow prednisone wean. but had difficulty and thus started on fasenra.  Also sent for CT chest given symptoms and high risk malignancy, found to have CHAPARRITA nodule now confirmed to be adenoca via EUS bx of apical CHPAARRITA nodule, now s/p CHAPARRITA wedge resection, path with some suggestion of mediastinal invasion, T3N0. Now on immunotherapy  COPD: Inhaler: Symbicort 160-4.5, albuterol Last exacerbation: 3/3/24  # of exacerbations since initiating Fasenra: 0 Bicarb 24 (3/24) in [facility]  7-29-25 Currently feeling well with her breathing, has not had any exacerbations or recent hospitalizations. Has not needed to use her albuterol as a rescue inhaler. No dyspnea on exertion. She is currently doing well on her Incruse, symbicort, Fasenra and azithro. Denies any orthopnea.     [YearQuit] : 2006

## 2025-07-29 NOTE — HISTORY OF PRESENT ILLNESS
[Former] : former [>= 20 pack years] : >= 20 pack years [Rheumatologic] : rheumatologic [TextBox_4] : 62F (accomapnied by her daughter: She) former smoker (smoked about one pack a day for 27 years, quit 17 years ago) with COPD (atopic phenotype) and ICU admissions in the past (never intubated) referred to pulm to establish care. At initial visit was using symbicort and albuterol plus chest vest for airway clearance twice a day. Could only walk maximum 1-2 blocks before she gets short of breath and was chronically on 60mg prednisone daily for many years, and bactrim qd. She is commonly exposed to people who smoke cigarettes which also triggers her COPD. Has RA. Follows with Dr. Dotty Avila. Currently on hydroxychloraquine 100-200mg once a day.  PFTs obtained, demonstrated mod obstruction and restriction, mod reduced DLCO, no BDR. Decreased 6MWT to 252 meters without desaturation. No hypercapnia on ABG. Found to have elevated IgE (411) while ON prednisone 60mg. She was escalated to triple therapy (incruse/symbicort) + azithromycin and instructed to attempt very slow prednisone wean. but had difficulty and thus started on fasenra.  Also sent for CT chest given symptoms and high risk malignancy, found to have CHAPARRITA nodule now confirmed to be adenoca via EUS bx of apical CHAPARRITA nodule, now s/p CHAPARRITA wedge resection, path with some suggestion of mediastinal invasion, T3N0. Now on immunotherapy  COPD: Inhaler: Symbicort 160-4.5, albuterol Last exacerbation: 3/3/24  # of exacerbations since initiating Fasenra: 0 Bicarb 24 (3/24) in HIE  7-29-25 Currently feeling well with her breathing, has not had any exacerbations or recent hospitalizations. Has not needed to use her albuterol as a rescue inhaler. No dyspnea on exertion. She is currently doing well on her Incruse, symbicort, Fasenra and azithro. Denies any orthopnea.     [YearQuit] : 2006

## 2025-07-29 NOTE — ASSESSMENT
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 Had significant improvement on Fasenra and triple therapy (Incruse, symbicort), and azithromycin. Has been off steroids for months without any recurrent exacerbation, excellent response to biologic. Now recovering from CHAPARRITA wedge resection (12/10/2024), initially felt to be localized, but pathology re-reviewed and likely margins were not clear. Now with lesion on spine. On immunotherapy. Improvement on distance on 6 minute walk test, PFTs from 12/2024 with improved lung volumes. Will obtain annual TTE to assess for PH, continue yearly monitoring of PFT and 6MWT. Repeat PFTs sooner if develops dyspnea while on immunotherapy  RTC in 3 months to f/u TTE results and will repeat 6MWT/PFTs in 6 months as patient is clinically improved with respiratory symptoms.